# Patient Record
Sex: MALE | Race: WHITE | NOT HISPANIC OR LATINO | Employment: OTHER | URBAN - METROPOLITAN AREA
[De-identification: names, ages, dates, MRNs, and addresses within clinical notes are randomized per-mention and may not be internally consistent; named-entity substitution may affect disease eponyms.]

---

## 2017-08-15 DIAGNOSIS — N40.1 ENLARGED PROSTATE WITH LOWER URINARY TRACT SYMPTOMS (LUTS): ICD-10-CM

## 2017-08-21 ENCOUNTER — ALLSCRIPTS OFFICE VISIT (OUTPATIENT)
Dept: OTHER | Facility: OTHER | Age: 72
End: 2017-08-21

## 2017-08-21 LAB
BILIRUB UR QL STRIP: NORMAL
CLARITY UR: NORMAL
COLOR UR: YELLOW
GLUCOSE (HISTORICAL): NORMAL
HGB UR QL STRIP.AUTO: NORMAL
KETONES UR STRIP-MCNC: NORMAL MG/DL
LEUKOCYTE ESTERASE UR QL STRIP: NORMAL
NITRITE UR QL STRIP: NORMAL
PH UR STRIP.AUTO: 6 [PH]
PROT UR STRIP-MCNC: NORMAL MG/DL
SP GR UR STRIP.AUTO: 1.02
UROBILINOGEN UR QL STRIP.AUTO: 0.2

## 2017-12-28 ENCOUNTER — GENERIC CONVERSION - ENCOUNTER (OUTPATIENT)
Dept: OTHER | Facility: OTHER | Age: 72
End: 2017-12-28

## 2018-01-22 VITALS
WEIGHT: 227 LBS | HEIGHT: 68 IN | BODY MASS INDEX: 34.4 KG/M2 | DIASTOLIC BLOOD PRESSURE: 78 MMHG | SYSTOLIC BLOOD PRESSURE: 136 MMHG

## 2018-01-23 NOTE — MISCELLANEOUS
Message   Recorded as Task   Date: 12/28/2017 12:46 PM, Created By: Magan George   Task Name: Call Back   Assigned To: Rajendra HUNTER,TEAM   Regarding Patient: Chema Easton, Status: Active   Comment:    Polly Jaime - 28 Dec 2017 12:46 PM     TASK CREATED  Caller: Self; [phone] (Home); [phone] (Mobile Phone)  Pt asking if his blood type is on file ? 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

## 2018-08-16 RX ORDER — AZELASTINE HYDROCHLORIDE 137 UG/1
SPRAY, METERED NASAL
Refills: 0 | COMMUNITY
Start: 2018-06-19 | End: 2021-01-15

## 2018-08-16 RX ORDER — OLOPATADINE HYDROCHLORIDE 1 MG/ML
SOLUTION/ DROPS OPHTHALMIC
COMMUNITY
Start: 2018-08-14 | End: 2018-08-21 | Stop reason: ALTCHOICE

## 2018-08-16 RX ORDER — SUCRALFATE 1 G/1
TABLET ORAL
Refills: 0 | COMMUNITY
Start: 2018-07-17 | End: 2018-08-21 | Stop reason: ALTCHOICE

## 2018-08-16 RX ORDER — ERGOCALCIFEROL (VITAMIN D2) 10 MCG
TABLET ORAL
COMMUNITY
End: 2018-08-21 | Stop reason: ALTCHOICE

## 2018-08-16 RX ORDER — TRIAMCINOLONE ACETONIDE 55 UG/1
SPRAY, METERED NASAL
COMMUNITY
Start: 2018-06-19 | End: 2019-04-15

## 2018-08-16 RX ORDER — CHLORAL HYDRATE 500 MG
CAPSULE ORAL
COMMUNITY
End: 2018-08-21 | Stop reason: ALTCHOICE

## 2018-08-16 RX ORDER — PANTOPRAZOLE SODIUM 40 MG/1
40 TABLET, DELAYED RELEASE ORAL 2 TIMES DAILY
Refills: 0 | COMMUNITY
Start: 2018-07-17 | End: 2018-08-21 | Stop reason: ALTCHOICE

## 2018-08-16 RX ORDER — FLUTICASONE PROPIONATE 50 MCG
SPRAY, SUSPENSION (ML) NASAL
COMMUNITY
Start: 2018-08-10 | End: 2021-01-15

## 2018-08-21 ENCOUNTER — OFFICE VISIT (OUTPATIENT)
Dept: UROLOGY | Facility: MEDICAL CENTER | Age: 73
End: 2018-08-21
Payer: MEDICARE

## 2018-08-21 VITALS
WEIGHT: 214 LBS | BODY MASS INDEX: 31.7 KG/M2 | SYSTOLIC BLOOD PRESSURE: 120 MMHG | DIASTOLIC BLOOD PRESSURE: 80 MMHG | HEIGHT: 69 IN

## 2018-08-21 DIAGNOSIS — N13.8 BPH WITH OBSTRUCTION/LOWER URINARY TRACT SYMPTOMS: Primary | ICD-10-CM

## 2018-08-21 DIAGNOSIS — N40.1 BPH WITH OBSTRUCTION/LOWER URINARY TRACT SYMPTOMS: Primary | ICD-10-CM

## 2018-08-21 DIAGNOSIS — N40.1 ENLARGED PROSTATE WITH LOWER URINARY TRACT SYMPTOMS (LUTS): ICD-10-CM

## 2018-08-21 LAB
SL AMB  POCT GLUCOSE, UA: NORMAL
SL AMB LEUKOCYTE ESTERASE,UA: NORMAL
SL AMB POCT BILIRUBIN,UA: NORMAL
SL AMB POCT BLOOD,UA: NORMAL
SL AMB POCT CLARITY,UA: NORMAL
SL AMB POCT COLOR,UA: YELLOW
SL AMB POCT KETONES,UA: NORMAL
SL AMB POCT NITRITE,UA: NORMAL
SL AMB POCT PH,UA: 6.5
SL AMB POCT SPECIFIC GRAVITY,UA: 1.01
SL AMB POCT URINE PROTEIN: NORMAL
SL AMB POCT UROBILINOGEN: 0.2

## 2018-08-21 PROCEDURE — 99213 OFFICE O/P EST LOW 20 MIN: CPT | Performed by: UROLOGY

## 2018-08-21 PROCEDURE — 81003 URINALYSIS AUTO W/O SCOPE: CPT | Performed by: UROLOGY

## 2018-08-21 NOTE — LETTER
August 21, 2018     MD [address]  [place] [address]    Patient: Lincoln Bates   YOB: 1945   Date of Visit: 8/21/2018       Dear Dr Janusz Real: Thank you for referring Za Barnard to me for evaluation  Below are my notes for this consultation  If you have questions, please do not hesitate to call me  I look forward to following your patient along with you  Sincerely,        Gerda Mohr MD        CC: No Recipients  Gerda Mohr MD  8/21/2018 10:40 AM  Sign at close encounter  Assessment/Plan:    No problem-specific Assessment & Plan notes found for this encounter  Diagnoses and all orders for this visit:    BPH with obstruction/lower urinary tract symptoms  -     POCT urine dip auto non-scope    Other orders  -     Azelastine HCl 137 MCG/SPRAY SOLN;   -     Discontinue: Multiple Vitamin (DAILY VALUE MULTIVITAMIN) TABS; Take by mouth  -     Discontinue: Omega-3 Fatty Acids (FISH OIL) 1,000 mg; Take by mouth  -     BREO ELLIPTA 200-25 MCG/INH inhaler;   -     fluticasone (FLONASE) 50 mcg/act nasal spray;   -     Discontinue: olopatadine (PATANOL) 0 1 % ophthalmic solution;   -     Discontinue: pantoprazole (PROTONIX) 40 mg tablet; Take 40 mg by mouth 2 (two) times a day  -     Discontinue: sucralfate (CARAFATE) 1 g tablet; take 1 tablet by mouth four times a day before meals and at bedtime  -     Triamcinolone Acetonide 55 MCG/ACT AERO;           Subjective:      Patient ID: Lincoln Bates is a 75 y o  male      HPI    The following portions of the patient's history were reviewed and updated as appropriate: allergies, current medications, past family history, past medical history, past social history, past surgical history and problem list     Review of Systems      Objective:      /80 (BP Location: Left arm, Patient Position: Sitting)   Ht 5' 8 5" (1 74 m)   Wt 97 1 kg (214 lb)   BMI 32 07 kg/m²           Physical Exam

## 2018-08-21 NOTE — PATIENT INSTRUCTIONS
Benign Prostatic Hypertrophy   WHAT YOU NEED TO KNOW:   Benign prostatic hypertrophy (BPH) is a condition that causes your prostate gland to grow larger than normal  The prostate gland is the male sex gland that produces a fluid that is part of semen  It is about the size of a walnut and it is located under the bladder  As the prostate grows, it can squeeze the urethra  This can block urine flow and cause urinary problems  DISCHARGE INSTRUCTIONS:   Medicines:   · Alpha blockers: This medicine relaxes the muscles in your prostate and bladder  It may help you urinate more easily  · 5 alpha reductase inhibitors: These medicines block the production of a hormone that causes the prostate to get larger  It may help slow the growth of the prostate or shrink the prostate  · Take your medicine as directed  Contact your healthcare provider if you think your medicine is not helping or if you have side effects  Tell him or her if you are allergic to any medicine  Keep a list of the medicines, vitamins, and herbs you take  Include the amounts, and when and why you take them  Bring the list or the pill bottles to follow-up visits  Carry your medicine list with you in case of an emergency  Follow up with your healthcare provider as directed:  Write down your questions so you remember to ask them during your visits  Manage BPH:   · Do not let your bladder get too full before you empty it  Urinate when you feel the urge  · Limit alcohol  Do not drink large amounts of any liquid at one time  · Decrease the amount of salt you eat  Examples of salty foods are chips, cured meats, and canned soups  Do not use table salt  · Healthcare providers may tell you not to eat spicy foods such as chilli peppers  This may help you find out if spicy food makes your BPH symptoms worse  · You may have sex if you feel well  Contact your healthcare provider if:   · There is a large amount of blood in your urine  · Your signs and symptoms get worse  · You have a fever  · You have questions or concerns about your condition or care  Seek care immediately if:   · You are unable to urinate  · Your bladder feels very full and painful  © 2017 [address] [employer] Information is for End User's use only and may not be sold, redistributed or otherwise used for commercial purposes  All illustrations and images included in CareNotes® are the copyrighted property of A D A M , Inc  or Chandan Perkins  The above information is an  only  It is not intended as medical advice for individual conditions or treatments  Talk to your doctor, nurse or pharmacist before following any medical regimen to see if it is safe and effective for you

## 2018-08-21 NOTE — PROGRESS NOTES
Assessment/Plan:    BPH with obstruction/lower urinary tract symptoms  AUA symptom score is 2  He is pleased with his voiding pattern 4 years post GreenLight laser  Urinalysis today is negative  PSA was 1 28 on August 11, 2018  He is doing well  He will return in 1 year  We discussed the pros and cons of PSA testing and will decide whether he has a PSA level drawn after next year's visit  Diagnoses and all orders for this visit:    BPH with obstruction/lower urinary tract symptoms  -     POCT urine dip auto non-scope    Other orders  -     Azelastine HCl 137 MCG/SPRAY SOLN;   -     Discontinue: Multiple Vitamin (DAILY VALUE MULTIVITAMIN) TABS; Take by mouth  -     Discontinue: Omega-3 Fatty Acids (FISH OIL) 1,000 mg; Take by mouth  -     BREO ELLIPTA 200-25 MCG/INH inhaler;   -     fluticasone (FLONASE) 50 mcg/act nasal spray;   -     Discontinue: olopatadine (PATANOL) 0 1 % ophthalmic solution;   -     Discontinue: pantoprazole (PROTONIX) 40 mg tablet; Take 40 mg by mouth 2 (two) times a day  -     Discontinue: sucralfate (CARAFATE) 1 g tablet; take 1 tablet by mouth four times a day before meals and at bedtime  -     Triamcinolone Acetonide 55 MCG/ACT AERO;           Subjective:      Patient ID: Irlanda Weston is a 75 y o  male  Chief complaint:  Lower tract symptoms    69-year-old male followed for lower tract symptoms  He is status post GreenLight laser prostatectomy in April, 2014  He reports he is voiding well  His stream is good and he empties his bladder adequately  He has no urgency or incontinence  There is no frequency  He gets up at most once a night to urinate  He is very pleased with his voiding pattern  There is no gross hematuria, dysuria or symptoms of infection  He has no history of urinary tract infection  He does have chronic renal insufficiency and notes that he will be seeing a nephrologist in September          The following portions of the patient's history were reviewed and updated as appropriate: allergies, current medications, past family history, past medical history, past social history, past surgical history and problem list     Review of Systems   Constitutional: Negative for chills, diaphoresis, fatigue and fever  HENT: Negative  Eyes: Negative  Respiratory: Negative  Cardiovascular: Negative  Endocrine: Negative  Musculoskeletal: Positive for back pain  Skin: Negative  Allergic/Immunologic: Negative  Neurological: Negative  Hematological: Negative  Psychiatric/Behavioral: Negative  Objective:      /80 (BP Location: Left arm, Patient Position: Sitting)   Ht 5' 8 5" (1 74 m)   Wt 97 1 kg (214 lb)   BMI 32 07 kg/m²          Physical Exam   Constitutional: He is oriented to person, place, and time  He appears well-developed and well-nourished  HENT:   Head: Normocephalic and atraumatic  Eyes: Conjunctivae are normal    Neck: Neck supple  Cardiovascular: Normal rate  Pulmonary/Chest: Effort normal    Abdominal: Soft  Bowel sounds are normal  He exhibits no distension and no mass  There is no tenderness  There is no rebound, no guarding and no CVA tenderness  A hernia is present  Hernia confirmed positive in the right inguinal area and confirmed positive in the left inguinal area  Genitourinary: Rectum normal, testes normal and penis normal  Right testis shows no mass  Left testis shows no mass  No phimosis or hypospadias  Genitourinary Comments: Prostate 1+ enlarged and palpably benign   Musculoskeletal: He exhibits no edema  Neurological: He is alert and oriented to person, place, and time  Skin: Skin is warm and dry  Psychiatric: He has a normal mood and affect  His behavior is normal  Judgment and thought content normal    Vitals reviewed

## 2018-08-21 NOTE — ASSESSMENT & PLAN NOTE
AUA symptom score is 2  He is pleased with his voiding pattern 4 years post GreenLight laser  Urinalysis today is negative  PSA was 1 28 on August 11, 2018  He is doing well  He will return in 1 year  We discussed the pros and cons of PSA testing and will decide whether he has a PSA level drawn after next year's visit

## 2019-01-31 ENCOUNTER — APPOINTMENT (OUTPATIENT)
Dept: URGENT CARE | Facility: MEDICAL CENTER | Age: 74
End: 2019-01-31
Payer: MEDICARE

## 2019-01-31 ENCOUNTER — APPOINTMENT (OUTPATIENT)
Dept: RADIOLOGY | Facility: MEDICAL CENTER | Age: 74
End: 2019-01-31
Payer: MEDICARE

## 2019-01-31 ENCOUNTER — TRANSCRIBE ORDERS (OUTPATIENT)
Dept: ADMINISTRATIVE | Facility: HOSPITAL | Age: 74
End: 2019-01-31
Payer: MEDICARE

## 2019-01-31 DIAGNOSIS — K40.20 NON-RECURRENT BILATERAL INGUINAL HERNIA WITHOUT OBSTRUCTION OR GANGRENE: ICD-10-CM

## 2019-01-31 DIAGNOSIS — K40.20 NON-RECURRENT BILATERAL INGUINAL HERNIA WITHOUT OBSTRUCTION OR GANGRENE: Primary | ICD-10-CM

## 2019-01-31 PROCEDURE — 71046 X-RAY EXAM CHEST 2 VIEWS: CPT

## 2019-01-31 PROCEDURE — 93005 ELECTROCARDIOGRAM TRACING: CPT | Performed by: SURGERY

## 2019-02-01 LAB
ATRIAL RATE: 81 BPM
P AXIS: 59 DEGREES
PR INTERVAL: 150 MS
QRS AXIS: -2 DEGREES
QRSD INTERVAL: 86 MS
QT INTERVAL: 372 MS
QTC INTERVAL: 432 MS
T WAVE AXIS: 41 DEGREES
VENTRICULAR RATE: 81 BPM

## 2019-02-01 PROCEDURE — 93010 ELECTROCARDIOGRAM REPORT: CPT | Performed by: SURGERY

## 2019-04-15 VITALS
BODY MASS INDEX: 33.04 KG/M2 | DIASTOLIC BLOOD PRESSURE: 79 MMHG | HEIGHT: 68 IN | HEART RATE: 81 BPM | WEIGHT: 218 LBS | SYSTOLIC BLOOD PRESSURE: 136 MMHG

## 2019-04-15 DIAGNOSIS — M65.331 TRIGGER MIDDLE FINGER OF RIGHT HAND: Primary | ICD-10-CM

## 2019-04-15 PROCEDURE — 20550 NJX 1 TENDON SHEATH/LIGAMENT: CPT | Performed by: ORTHOPAEDIC SURGERY

## 2019-04-15 PROCEDURE — 99203 OFFICE O/P NEW LOW 30 MIN: CPT | Performed by: ORTHOPAEDIC SURGERY

## 2019-04-15 RX ORDER — ROSUVASTATIN CALCIUM 10 MG/1
TABLET, COATED ORAL
Refills: 0 | COMMUNITY
Start: 2019-03-09 | End: 2021-01-15

## 2019-04-15 RX ORDER — ASPIRIN 81 MG/1
81 TABLET ORAL DAILY
COMMUNITY
End: 2021-01-15

## 2019-04-15 RX ADMIN — LIDOCAINE HYDROCHLORIDE 0.5 ML: 10 INJECTION, SOLUTION INFILTRATION; PERINEURAL at 14:40

## 2019-04-15 RX ADMIN — TRIAMCINOLONE ACETONIDE 20 MG: 40 INJECTION, SUSPENSION INTRA-ARTICULAR; INTRAMUSCULAR at 14:40

## 2019-04-16 RX ORDER — TRIAMCINOLONE ACETONIDE 40 MG/ML
20 INJECTION, SUSPENSION INTRA-ARTICULAR; INTRAMUSCULAR
Status: COMPLETED | OUTPATIENT
Start: 2019-04-15 | End: 2019-04-15

## 2019-04-16 RX ORDER — LIDOCAINE HYDROCHLORIDE 10 MG/ML
0.5 INJECTION, SOLUTION INFILTRATION; PERINEURAL
Status: COMPLETED | OUTPATIENT
Start: 2019-04-15 | End: 2019-04-15

## 2019-04-29 VITALS
HEIGHT: 68 IN | DIASTOLIC BLOOD PRESSURE: 86 MMHG | BODY MASS INDEX: 33.04 KG/M2 | HEART RATE: 69 BPM | WEIGHT: 218 LBS | SYSTOLIC BLOOD PRESSURE: 148 MMHG

## 2019-04-29 DIAGNOSIS — M65.331 TRIGGER MIDDLE FINGER OF RIGHT HAND: Primary | ICD-10-CM

## 2019-04-29 PROCEDURE — 99213 OFFICE O/P EST LOW 20 MIN: CPT | Performed by: ORTHOPAEDIC SURGERY

## 2019-04-29 RX ORDER — TRIAMCINOLONE ACETONIDE 55 UG/1
SPRAY, METERED NASAL
COMMUNITY
Start: 2018-06-19 | End: 2021-01-15

## 2019-08-15 ENCOUNTER — TELEPHONE (OUTPATIENT)
Dept: UROLOGY | Facility: MEDICAL CENTER | Age: 74
End: 2019-08-15

## 2019-08-15 NOTE — TELEPHONE ENCOUNTER
LMOM for pt to return call  Documentation from office visit on 8/21/18 by Dr Yaritza Torrez      (We discussed the pros and cons of PSA testing and will decide whether he has a PSA level drawn after next year's visit )

## 2019-08-15 NOTE — TELEPHONE ENCOUNTER
Patient of Dr Bear Marley seen in the [facility] office  Patient advised that he has a 1 year follow up and would like to know if he is in need of labs prior to visit  Please advise

## 2019-09-17 NOTE — PROGRESS NOTES
There are no diagnoses linked to this encounter  Assessment and plan:       1  Lower urinary tract symptoms  - Patient denies any lower urinary tract symptoms today  - He does have incomplete bladder emptying with a PVR of 100 mL  He has previously required GreenLight laser procedure for high volume urinary retention   - He will follow up in 1 year for symptom reassessment and PVR  Jose Strauss PA-C      Chief Complaint     Chief Complaint   Patient presents with    Benign Prostatic Hypertrophy         History of Present Illness     Austin Matta is a 75 y o  male patient known to Dr Alphonso Lucero for lower urinary tract symptoms  He underwent GreenLight laser prostatectomy in April of 2014  He continues to report he is voiding well with a strong stream and emptying his bladder adequately  He continues to deny any urgency, frequency, or incontinence  Patient does have chronic renal insufficiency and is followed by a nephrologist for this  Patient had PSA drawn as recently as last year where it was 1 8  At his last visit he and Dr Alphonso Lucero discussed the pros and cons of continuing PSA testing  The patient has chosen to discontinue PSA screening per AUA guidelines  Laboratory     No results found for: CREATININE    No results found for: PSA    Recent Results (from the past 1 hour(s))   POCT Measure PVR    Collection Time: 09/18/19  9:46 AM   Result Value Ref Range    POST-VOID RESIDUAL VOLUME, ML  mL         Review of Systems     Review of Systems   Constitutional: Negative for chills and fever  HENT: Negative  Eyes: Negative  Respiratory: Negative for shortness of breath  Cardiovascular: Negative for chest pain  Gastrointestinal: Negative for constipation, diarrhea, nausea and vomiting  Genitourinary: Negative for difficulty urinating, dysuria, enuresis, flank pain, frequency, hematuria and urgency           AUA SYMPTOM SCORE      Most Recent Value   AUA SYMPTOM SCORE   How often have you had a sensation of not emptying your bladder completely after you finished urinating? 0   How often have you had to urinate again less than two hours after you finished urinating? 1   How often have you found you stopped and started again several times when you urinate?  0   How often have you found it difficult to postpone urination? 0   How often have you had a weak urinary stream?  1   How often have you had to push or strain to begin urination? 0   How many times did you most typically get up to urinate from the time you went to bed at night until the time you got up in the morning? 1   Quality of Life: If you were to spend the rest of your life with your urinary condition just the way it is now, how would you feel about that?  0   AUA SYMPTOM SCORE  3                Allergies     Allergies   Allergen Reactions    Other     Pollen Extract        Physical Exam     Physical Exam   Constitutional: He is oriented to person, place, and time  He appears well-developed and well-nourished  No distress  Appears younger than stated age   HENT:   Head: Normocephalic and atraumatic  Right Ear: External ear normal    Left Ear: External ear normal    Nose: Nose normal    Eyes: Right eye exhibits no discharge  Left eye exhibits no discharge  No scleral icterus  Cardiovascular: Normal rate  Pulmonary/Chest: Effort normal    Genitourinary:   Genitourinary Comments: Declined   Musculoskeletal:   Ambulates independently   Neurological: He is alert and oriented to person, place, and time  Skin: Skin is warm and dry  He is not diaphoretic  Psychiatric: He has a normal mood and affect  His behavior is normal  Judgment and thought content normal    Nursing note and vitals reviewed          Vital Signs     Vitals:    09/18/19 0943   BP: 132/86   BP Location: Left arm   Patient Position: Sitting   Cuff Size: Standard   Pulse: 80   Weight: 98 kg (216 lb)   Height: 5' 8" (1 727 m) Current Medications       Current Outpatient Medications:     BREO ELLIPTA 200-25 MCG/INH inhaler, , Disp: , Rfl:     Cholecalciferol 5000 units capsule, Take 5,000 Units by mouth, Disp: , Rfl:     fluticasone (FLONASE) 50 mcg/act nasal spray, , Disp: , Rfl:     olopatadine (PATANOL) 0 1 % ophthalmic solution, 1 drop, Disp: , Rfl:     aspirin (ECOTRIN LOW STRENGTH) 81 mg EC tablet, Take 81 mg by mouth daily, Disp: , Rfl:     Azelastine HCl 137 MCG/SPRAY SOLN, , Disp: , Rfl: 0    PROAIR  (90 Base) MCG/ACT inhaler, inhale 1 puff by mouth every 4 hours if needed for wheezing, Disp: , Rfl: 0    rosuvastatin (CRESTOR) 10 MG tablet, , Disp: , Rfl: 0    Triamcinolone Acetonide 55 MCG/ACT AERO, , Disp: , Rfl:       Active Problems     Patient Active Problem List   Diagnosis    BPH with obstruction/lower urinary tract symptoms         Past Medical History     Past Medical History:   Diagnosis Date    Azotemia     BPH with obstruction/lower urinary tract symptoms     Hydronephrosis     Incomplete bladder emptying     Urinary retention          Surgical History     Past Surgical History:   Procedure Laterality Date    CYSTOSCOPY  2014    KNEE SURGERY  2013    ROOT CANAL      TRANSURETHRAL RESECTION OF PROSTATE           Family History     Family History   Family history unknown: Yes         Social History     Social History       Radiology

## 2019-09-18 ENCOUNTER — OFFICE VISIT (OUTPATIENT)
Dept: UROLOGY | Facility: CLINIC | Age: 74
End: 2019-09-18
Payer: MEDICARE

## 2019-09-18 VITALS
WEIGHT: 216 LBS | DIASTOLIC BLOOD PRESSURE: 86 MMHG | BODY MASS INDEX: 32.74 KG/M2 | HEIGHT: 68 IN | SYSTOLIC BLOOD PRESSURE: 132 MMHG | HEART RATE: 80 BPM

## 2019-09-18 DIAGNOSIS — N40.1 BPH WITH OBSTRUCTION/LOWER URINARY TRACT SYMPTOMS: Primary | ICD-10-CM

## 2019-09-18 DIAGNOSIS — N13.8 BPH WITH OBSTRUCTION/LOWER URINARY TRACT SYMPTOMS: Primary | ICD-10-CM

## 2019-09-18 LAB — POST-VOID RESIDUAL VOLUME, ML POC: 100 ML

## 2019-09-18 PROCEDURE — 99213 OFFICE O/P EST LOW 20 MIN: CPT | Performed by: PHYSICIAN ASSISTANT

## 2019-09-18 PROCEDURE — 51798 US URINE CAPACITY MEASURE: CPT | Performed by: PHYSICIAN ASSISTANT

## 2019-09-18 RX ORDER — OLOPATADINE HYDROCHLORIDE 1 MG/ML
1 SOLUTION/ DROPS OPHTHALMIC
COMMUNITY
Start: 2018-08-13

## 2020-04-28 VITALS
HEART RATE: 79 BPM | DIASTOLIC BLOOD PRESSURE: 98 MMHG | HEIGHT: 68 IN | SYSTOLIC BLOOD PRESSURE: 150 MMHG | WEIGHT: 216 LBS | BODY MASS INDEX: 32.74 KG/M2

## 2020-04-28 DIAGNOSIS — M65.331 TRIGGER MIDDLE FINGER OF RIGHT HAND: Primary | ICD-10-CM

## 2020-04-28 PROCEDURE — 99213 OFFICE O/P EST LOW 20 MIN: CPT | Performed by: ORTHOPAEDIC SURGERY

## 2020-04-28 PROCEDURE — 20550 NJX 1 TENDON SHEATH/LIGAMENT: CPT | Performed by: ORTHOPAEDIC SURGERY

## 2020-04-28 RX ADMIN — TRIAMCINOLONE ACETONIDE 20 MG: 40 INJECTION, SUSPENSION INTRA-ARTICULAR; INTRAMUSCULAR at 11:23

## 2020-04-28 RX ADMIN — LIDOCAINE HYDROCHLORIDE 0.5 ML: 10 INJECTION, SOLUTION INFILTRATION; PERINEURAL at 11:23

## 2020-04-30 RX ORDER — LIDOCAINE HYDROCHLORIDE 10 MG/ML
0.5 INJECTION, SOLUTION INFILTRATION; PERINEURAL
Status: COMPLETED | OUTPATIENT
Start: 2020-04-28 | End: 2020-04-28

## 2020-04-30 RX ORDER — TRIAMCINOLONE ACETONIDE 40 MG/ML
20 INJECTION, SUSPENSION INTRA-ARTICULAR; INTRAMUSCULAR
Status: COMPLETED | OUTPATIENT
Start: 2020-04-28 | End: 2020-04-28

## 2020-10-07 ENCOUNTER — OFFICE VISIT (OUTPATIENT)
Dept: UROLOGY | Facility: CLINIC | Age: 75
End: 2020-10-07
Payer: MEDICARE

## 2020-10-07 VITALS
DIASTOLIC BLOOD PRESSURE: 70 MMHG | WEIGHT: 224.8 LBS | HEART RATE: 86 BPM | HEIGHT: 68 IN | TEMPERATURE: 97.3 F | SYSTOLIC BLOOD PRESSURE: 138 MMHG | BODY MASS INDEX: 34.07 KG/M2

## 2020-10-07 DIAGNOSIS — N13.8 BPH WITH OBSTRUCTION/LOWER URINARY TRACT SYMPTOMS: Primary | ICD-10-CM

## 2020-10-07 DIAGNOSIS — N40.1 BPH WITH OBSTRUCTION/LOWER URINARY TRACT SYMPTOMS: Primary | ICD-10-CM

## 2020-10-07 LAB — POST-VOID RESIDUAL VOLUME, ML POC: 44 ML

## 2020-10-07 PROCEDURE — 99213 OFFICE O/P EST LOW 20 MIN: CPT | Performed by: PHYSICIAN ASSISTANT

## 2020-10-07 PROCEDURE — 51798 US URINE CAPACITY MEASURE: CPT | Performed by: PHYSICIAN ASSISTANT

## 2021-01-11 VITALS
BODY MASS INDEX: 32.58 KG/M2 | DIASTOLIC BLOOD PRESSURE: 83 MMHG | HEART RATE: 77 BPM | HEIGHT: 68 IN | SYSTOLIC BLOOD PRESSURE: 148 MMHG | WEIGHT: 215 LBS

## 2021-01-11 DIAGNOSIS — M65.331 TRIGGER MIDDLE FINGER OF RIGHT HAND: Primary | ICD-10-CM

## 2021-01-11 PROCEDURE — 99214 OFFICE O/P EST MOD 30 MIN: CPT | Performed by: ORTHOPAEDIC SURGERY

## 2021-01-11 RX ORDER — A/SINGAPORE/GP1908/2015 IVR-180 (AN A/MICHIGAN/45/2015 (H1N1)PDM09-LIKE VIRUS, A/HONG KONG/4801/2014, NYMC X-263B (H3N2) (AN A/HONG KONG/4801/2014-LIKE VIRUS), AND B/BRISBANE/60/2008, WILD TYPE (A B/BRISBANE/60/2008-LIKE VIRUS) 15; 15; 15 UG/.5ML; UG/.5ML; UG/.5ML
INJECTION, SUSPENSION INTRAMUSCULAR
COMMUNITY
Start: 2020-10-28 | End: 2021-01-15

## 2021-01-11 RX ORDER — ACETAMINOPHEN 500 MG
TABLET ORAL
Qty: 30 TABLET | Refills: 0 | Status: SHIPPED | OUTPATIENT
Start: 2021-01-11

## 2021-01-11 RX ORDER — LIDOCAINE HYDROCHLORIDE AND EPINEPHRINE 10; 10 MG/ML; UG/ML
10 INJECTION, SOLUTION INFILTRATION; PERINEURAL ONCE
Status: CANCELLED | OUTPATIENT
Start: 2021-01-11 | End: 2021-01-11

## 2021-01-11 NOTE — H&P (VIEW-ONLY)
CHIEF COMPLAINT:  Chief Complaint   Patient presents with    Right Hand - Follow-up       SUBJECTIVE:  Clari Cha is a 67y o  year old male who presents for follow-up regarding right long finger trigger finger  Patient was given a cortisone injection on 04/15/2019 and 2020  Patient states that he notes continued locking clicking symptoms  He would like to proceed with surgical intervention at this time  The patient denies any cardiac or pulmonary issues  Denies diabetes  Denies any history of MI, gastric ulcers, or liver issues  Denies blood thinners  Patient is unable to take anti-inflammatories due to stage 3 kidney disease        PAST MEDICAL HISTORY:  Past Medical History:   Diagnosis Date    Azotemia     BPH with obstruction/lower urinary tract symptoms     Hydronephrosis     Incomplete bladder emptying     Urinary retention        PAST SURGICAL HISTORY:  Past Surgical History:   Procedure Laterality Date    CYSTOSCOPY  2014    KNEE SURGERY  2013    ROOT CANAL      TRANSURETHRAL RESECTION OF PROSTATE         FAMILY HISTORY:  Family History   Family history unknown: Yes       SOCIAL HISTORY:  Social History     Tobacco Use    Smoking status: Former Smoker     Quit date: 2011     Years since quittin 9    Smokeless tobacco: Never Used   Substance Use Topics    Alcohol use:  Yes    Drug use: No       MEDICATIONS:    Current Outpatient Medications:     aspirin (ECOTRIN LOW STRENGTH) 81 mg EC tablet, Take 81 mg by mouth daily, Disp: , Rfl:     Azelastine HCl 137 MCG/SPRAY SOLN, , Disp: , Rfl: 0    BREO ELLIPTA 200-25 MCG/INH inhaler, , Disp: , Rfl:     Cholecalciferol 5000 units capsule, Take 2,000 Units by mouth , Disp: , Rfl:     Fluad Quadrivalent 0 5 ML PRSY, inject 0 5 milliliters intramuscularly, Disp: , Rfl:     fluticasone (FLONASE) 50 mcg/act nasal spray, , Disp: , Rfl:     olopatadine (PATANOL) 0 1 % ophthalmic solution, 1 drop, Disp: , Rfl:     PROAIR HFA 108 (90 Base) MCG/ACT inhaler, inhale 1 puff by mouth every 4 hours if needed for wheezing, Disp: , Rfl: 0    rosuvastatin (CRESTOR) 10 MG tablet, , Disp: , Rfl: 0    Triamcinolone Acetonide 55 MCG/ACT AERO, , Disp: , Rfl:     acetaminophen (TYLENOL) 500 mg tablet, Take 1 500mg tablet in the morning prior to surgery, then 1 tablet every 6 hours for 5-7 days  , Disp: 30 tablet, Rfl: 0    ALLERGIES:  Allergies   Allergen Reactions    Other      Cats, Ragweed    Pollen Extract        REVIEW OF SYSTEMS:  Review of Systems  ROS:   General: no fever, no chills  HEENT:  No loss of hearing or eyesight problems  Eyes:  No red eyes  Respiratory:  No coughing, shortness of breath or wheezing  Cardiovascular:  No chest pain, no palpitations  GI:  Abdomen soft nontender, denies nausea  Endocrine:  No muscle weakness, no frequent urination, no excessive thirst  Urinary:  No dysuria, no incontinence  Musculoskeletal: see HPI and PE  SKIN:  No skin rash, no dry skin  Neurological:  No headaches, no confusion  Psychiatric:  No suicide thoughts, no anxiety, no depression  Review of all other systems is negative    VITALS:  Vitals:    01/11/21 1051   BP: 148/83   Pulse: 77       LABS:  HgA1c: No results found for: HGBA1C  BMP: No results found for: GLUCOSE, CALCIUM, NA, K, CO2, CL, BUN, CREATININE    _____________________________________________________  PHYSICAL EXAMINATION:  General: well developed and well nourished, alert, oriented times 3 and appears comfortable  Psychiatric: Normal  HEENT: Trachea Midline, No torticollis  Heart:  Regular rate and rhythm  Lungs:  Clear to auscultation  Pulmonary: No audible wheezing or respiratory distress   Skin: No masses, erythema, lacerations, fluctation, ulcerations  Neurovascular: Sensation Intact to the Median, Ulnar, Radial Nerve, Motor Intact to the Median, Ulnar, Radial Nerve and Pulses Intact    MUSCULOSKELETAL EXAMINATION:  right long finger:  Positive palpable nodule over the A1 pulley  Positive tenderness to palpation over A1 pulley  Positive clicking  Positive catching        ___________________________________________________  STUDIES REVIEWED:  No studies reviewed  PROCEDURES PERFORMED:  Procedures  No Procedures performed today    _____________________________________________________  ASSESSMENT/PLAN:    Right long finger trigger finger  - conservative and operative treatment were discussed with the patient  He would like to proceed with surgical intervention  Detail consent was obtained today   Surgery: right long finger trigger finger release   Anesthesia:  Local   Antibiotics:  None   Medical clearance: not needed   Hand therapy: ordered   Consent: obtained   Patient will take Tylenol as needed for pain    Surgery medication instructions: You will stop eating and drinking at midnight the night before your surgery, but you may continue to take your normal medications with a small sip of water  In the morning on the day of your surgery, we would like you to take the following medication:   Tylenol 500mg one tablet by mouth    After surgery, we would like you to take Tylenol 500 mg one tablet by mouth every 6 hours  (at breakfast, lunch and dinner) for 5-7 days after your surgery  Please take this medication EVERYDAY after surgery for 5-7 days, and not just as needed  Taking this medications after surgery will limit your need for prescription pain medication  We will also prescribe a narcotic pain medication for a limited time after surgery that you can take as needed for moderate or severe pain  The narcotic pain medication may also have Tylenol in it  Please limit Tylenol usage to under 3,000mg a day  Diagnoses and all orders for this visit:    Trigger middle finger of right hand  -     Ambulatory referral to PT/OT hand therapy; Future  -     acetaminophen (TYLENOL) 500 mg tablet;  Take 1 500mg tablet in the morning prior to surgery, then 1 tablet every 6 hours for 5-7 days  -     Case request operating room: Right long finger trigger finger release; Standing  -     Case request operating room: Right long finger trigger finger release    Other orders  -     Fluad Quadrivalent 0 5 ML PRSY; inject 0 5 milliliters intramuscularly  -     Diet NPO; Sips with meds; Standing  -     Height and weight upon arrival; Standing  -     Void on call to OR; Standing  -     lidocaine-epinephrine (XYLOCAINE/EPINEPHRINE) 1 %-1:100,000 injection 10 mL  -     sodium bicarbonate 8 4 % injection 50 mEq          Follow Up:  Return for post op   Work/school status:  No restrictions    To Do Next Visit:  Re-evaluation of current issue and Sutures out      Scribe Attestation    I,:  Flores Oviedo PA-C am acting as a scribe while in the presence of the attending physician :       I,:  Ghanshyam Ortiz MD personally performed the services described in this documentation    as scribed in my presence :           Portions of the record may have been created with voice recognition software  Occasional wrong word or "sound a like" substitutions may have occurred due to the inherent limitations of voice recognition software  Read the chart carefully and recognize, using context, where substitutions have occurred

## 2021-01-11 NOTE — H&P
CHIEF COMPLAINT:  Chief Complaint   Patient presents with    Right Hand - Follow-up       SUBJECTIVE:  Kb Yarbrough is a 67y o  year old male who presents for follow-up regarding right long finger trigger finger  Patient was given a cortisone injection on 04/15/2019 and 2020  Patient states that he notes continued locking clicking symptoms  He would like to proceed with surgical intervention at this time  The patient denies any cardiac or pulmonary issues  Denies diabetes  Denies any history of MI, gastric ulcers, or liver issues  Denies blood thinners  Patient is unable to take anti-inflammatories due to stage 3 kidney disease        PAST MEDICAL HISTORY:  Past Medical History:   Diagnosis Date    Azotemia     BPH with obstruction/lower urinary tract symptoms     Hydronephrosis     Incomplete bladder emptying     Urinary retention        PAST SURGICAL HISTORY:  Past Surgical History:   Procedure Laterality Date    CYSTOSCOPY  2014    KNEE SURGERY  2013    ROOT CANAL      TRANSURETHRAL RESECTION OF PROSTATE         FAMILY HISTORY:  Family History   Family history unknown: Yes       SOCIAL HISTORY:  Social History     Tobacco Use    Smoking status: Former Smoker     Quit date: 2011     Years since quittin 9    Smokeless tobacco: Never Used   Substance Use Topics    Alcohol use:  Yes    Drug use: No       MEDICATIONS:    Current Outpatient Medications:     aspirin (ECOTRIN LOW STRENGTH) 81 mg EC tablet, Take 81 mg by mouth daily, Disp: , Rfl:     Azelastine HCl 137 MCG/SPRAY SOLN, , Disp: , Rfl: 0    BREO ELLIPTA 200-25 MCG/INH inhaler, , Disp: , Rfl:     Cholecalciferol 5000 units capsule, Take 2,000 Units by mouth , Disp: , Rfl:     Fluad Quadrivalent 0 5 ML PRSY, inject 0 5 milliliters intramuscularly, Disp: , Rfl:     fluticasone (FLONASE) 50 mcg/act nasal spray, , Disp: , Rfl:     olopatadine (PATANOL) 0 1 % ophthalmic solution, 1 drop, Disp: , Rfl:     PROAIR HFA 108 (90 Base) MCG/ACT inhaler, inhale 1 puff by mouth every 4 hours if needed for wheezing, Disp: , Rfl: 0    rosuvastatin (CRESTOR) 10 MG tablet, , Disp: , Rfl: 0    Triamcinolone Acetonide 55 MCG/ACT AERO, , Disp: , Rfl:     acetaminophen (TYLENOL) 500 mg tablet, Take 1 500mg tablet in the morning prior to surgery, then 1 tablet every 6 hours for 5-7 days  , Disp: 30 tablet, Rfl: 0    ALLERGIES:  Allergies   Allergen Reactions    Other      Cats, Ragweed    Pollen Extract        REVIEW OF SYSTEMS:  Review of Systems  ROS:   General: no fever, no chills  HEENT:  No loss of hearing or eyesight problems  Eyes:  No red eyes  Respiratory:  No coughing, shortness of breath or wheezing  Cardiovascular:  No chest pain, no palpitations  GI:  Abdomen soft nontender, denies nausea  Endocrine:  No muscle weakness, no frequent urination, no excessive thirst  Urinary:  No dysuria, no incontinence  Musculoskeletal: see HPI and PE  SKIN:  No skin rash, no dry skin  Neurological:  No headaches, no confusion  Psychiatric:  No suicide thoughts, no anxiety, no depression  Review of all other systems is negative    VITALS:  Vitals:    01/11/21 1051   BP: 148/83   Pulse: 77       LABS:  HgA1c: No results found for: HGBA1C  BMP: No results found for: GLUCOSE, CALCIUM, NA, K, CO2, CL, BUN, CREATININE    _____________________________________________________  PHYSICAL EXAMINATION:  General: well developed and well nourished, alert, oriented times 3 and appears comfortable  Psychiatric: Normal  HEENT: Trachea Midline, No torticollis  Heart:  Regular rate and rhythm  Lungs:  Clear to auscultation  Pulmonary: No audible wheezing or respiratory distress   Skin: No masses, erythema, lacerations, fluctation, ulcerations  Neurovascular: Sensation Intact to the Median, Ulnar, Radial Nerve, Motor Intact to the Median, Ulnar, Radial Nerve and Pulses Intact    MUSCULOSKELETAL EXAMINATION:  right long finger:  Positive palpable nodule over the A1 pulley  Positive tenderness to palpation over A1 pulley  Positive clicking  Positive catching        ___________________________________________________  STUDIES REVIEWED:  No studies reviewed  PROCEDURES PERFORMED:  Procedures  No Procedures performed today    _____________________________________________________  ASSESSMENT/PLAN:    Right long finger trigger finger  - conservative and operative treatment were discussed with the patient  He would like to proceed with surgical intervention  Detail consent was obtained today   Surgery: right long finger trigger finger release   Anesthesia:  Local   Antibiotics:  None   Medical clearance: not needed   Hand therapy: ordered   Consent: obtained   Patient will take Tylenol as needed for pain    Surgery medication instructions: You will stop eating and drinking at midnight the night before your surgery, but you may continue to take your normal medications with a small sip of water  In the morning on the day of your surgery, we would like you to take the following medication:   Tylenol 500mg one tablet by mouth    After surgery, we would like you to take Tylenol 500 mg one tablet by mouth every 6 hours  (at breakfast, lunch and dinner) for 5-7 days after your surgery  Please take this medication EVERYDAY after surgery for 5-7 days, and not just as needed  Taking this medications after surgery will limit your need for prescription pain medication  We will also prescribe a narcotic pain medication for a limited time after surgery that you can take as needed for moderate or severe pain  The narcotic pain medication may also have Tylenol in it  Please limit Tylenol usage to under 3,000mg a day  Diagnoses and all orders for this visit:    Trigger middle finger of right hand  -     Ambulatory referral to PT/OT hand therapy; Future  -     acetaminophen (TYLENOL) 500 mg tablet;  Take 1 500mg tablet in the morning prior to surgery, then 1 tablet every 6 hours for 5-7 days  -     Case request operating room: Right long finger trigger finger release; Standing  -     Case request operating room: Right long finger trigger finger release    Other orders  -     Fluad Quadrivalent 0 5 ML PRSY; inject 0 5 milliliters intramuscularly  -     Diet NPO; Sips with meds; Standing  -     Height and weight upon arrival; Standing  -     Void on call to OR; Standing  -     lidocaine-epinephrine (XYLOCAINE/EPINEPHRINE) 1 %-1:100,000 injection 10 mL  -     sodium bicarbonate 8 4 % injection 50 mEq          Follow Up:  Return for post op   Work/school status:  No restrictions    To Do Next Visit:  Re-evaluation of current issue and Sutures out      Scribe Attestation    I,:  Flores Oviedo PA-C am acting as a scribe while in the presence of the attending physician :       I,:  Ghanshyam Ortiz MD personally performed the services described in this documentation    as scribed in my presence :           Portions of the record may have been created with voice recognition software  Occasional wrong word or "sound a like" substitutions may have occurred due to the inherent limitations of voice recognition software  Read the chart carefully and recognize, using context, where substitutions have occurred

## 2021-01-11 NOTE — PROGRESS NOTES
CHIEF COMPLAINT:  Chief Complaint   Patient presents with    Right Hand - Follow-up       SUBJECTIVE:  Loan Kaba is a 67y o  year old male who presents for follow-up regarding right long finger trigger finger  Patient was given a cortisone injection on 04/15/2019 and 2020  Patient states that he notes continued locking clicking symptoms  He would like to proceed with surgical intervention at this time  The patient denies any cardiac or pulmonary issues  Denies diabetes  Denies any history of MI, gastric ulcers, or liver issues  Denies blood thinners  Patient is unable to take anti-inflammatories due to stage 3 kidney disease        PAST MEDICAL HISTORY:  Past Medical History:   Diagnosis Date    Azotemia     BPH with obstruction/lower urinary tract symptoms     Hydronephrosis     Incomplete bladder emptying     Urinary retention        PAST SURGICAL HISTORY:  Past Surgical History:   Procedure Laterality Date    CYSTOSCOPY  2014    KNEE SURGERY  2013    ROOT CANAL      TRANSURETHRAL RESECTION OF PROSTATE         FAMILY HISTORY:  Family History   Family history unknown: Yes       SOCIAL HISTORY:  Social History     Tobacco Use    Smoking status: Former Smoker     Quit date: 2011     Years since quittin 9    Smokeless tobacco: Never Used   Substance Use Topics    Alcohol use:  Yes    Drug use: No       MEDICATIONS:    Current Outpatient Medications:     aspirin (ECOTRIN LOW STRENGTH) 81 mg EC tablet, Take 81 mg by mouth daily, Disp: , Rfl:     Azelastine HCl 137 MCG/SPRAY SOLN, , Disp: , Rfl: 0    BREO ELLIPTA 200-25 MCG/INH inhaler, , Disp: , Rfl:     Cholecalciferol 5000 units capsule, Take 2,000 Units by mouth , Disp: , Rfl:     Fluad Quadrivalent 0 5 ML PRSY, inject 0 5 milliliters intramuscularly, Disp: , Rfl:     fluticasone (FLONASE) 50 mcg/act nasal spray, , Disp: , Rfl:     olopatadine (PATANOL) 0 1 % ophthalmic solution, 1 drop, Disp: , Rfl:     PROAIR HFA 108 (90 Base) MCG/ACT inhaler, inhale 1 puff by mouth every 4 hours if needed for wheezing, Disp: , Rfl: 0    rosuvastatin (CRESTOR) 10 MG tablet, , Disp: , Rfl: 0    Triamcinolone Acetonide 55 MCG/ACT AERO, , Disp: , Rfl:     acetaminophen (TYLENOL) 500 mg tablet, Take 1 500mg tablet in the morning prior to surgery, then 1 tablet every 6 hours for 5-7 days  , Disp: 30 tablet, Rfl: 0    ALLERGIES:  Allergies   Allergen Reactions    Other      Cats, Ragweed    Pollen Extract        REVIEW OF SYSTEMS:  Review of Systems  ROS:   General: no fever, no chills  HEENT:  No loss of hearing or eyesight problems  Eyes:  No red eyes  Respiratory:  No coughing, shortness of breath or wheezing  Cardiovascular:  No chest pain, no palpitations  GI:  Abdomen soft nontender, denies nausea  Endocrine:  No muscle weakness, no frequent urination, no excessive thirst  Urinary:  No dysuria, no incontinence  Musculoskeletal: see HPI and PE  SKIN:  No skin rash, no dry skin  Neurological:  No headaches, no confusion  Psychiatric:  No suicide thoughts, no anxiety, no depression  Review of all other systems is negative    VITALS:  Vitals:    01/11/21 1051   BP: 148/83   Pulse: 77       LABS:  HgA1c: No results found for: HGBA1C  BMP: No results found for: GLUCOSE, CALCIUM, NA, K, CO2, CL, BUN, CREATININE    _____________________________________________________  PHYSICAL EXAMINATION:  General: well developed and well nourished, alert, oriented times 3 and appears comfortable  Psychiatric: Normal  HEENT: Trachea Midline, No torticollis  Heart:  Regular rate and rhythm  Lungs:  Clear to auscultation  Pulmonary: No audible wheezing or respiratory distress   Skin: No masses, erythema, lacerations, fluctation, ulcerations  Neurovascular: Sensation Intact to the Median, Ulnar, Radial Nerve, Motor Intact to the Median, Ulnar, Radial Nerve and Pulses Intact    MUSCULOSKELETAL EXAMINATION:  right long finger:  Positive palpable nodule over the A1 pulley  Positive tenderness to palpation over A1 pulley  Positive clicking  Positive catching        ___________________________________________________  STUDIES REVIEWED:  No studies reviewed  PROCEDURES PERFORMED:  Procedures  No Procedures performed today    _____________________________________________________  ASSESSMENT/PLAN:    Right long finger trigger finger  - conservative and operative treatment were discussed with the patient  He would like to proceed with surgical intervention  Detail consent was obtained today   Surgery: right long finger trigger finger release   Anesthesia:  Local   Antibiotics:  None   Medical clearance: not needed   Hand therapy: ordered   Consent: obtained   Patient will take Tylenol as needed for pain    Surgery medication instructions: You will stop eating and drinking at midnight the night before your surgery, but you may continue to take your normal medications with a small sip of water  In the morning on the day of your surgery, we would like you to take the following medication:   Tylenol 500mg one tablet by mouth    After surgery, we would like you to take Tylenol 500 mg one tablet by mouth every 6 hours  (at breakfast, lunch and dinner) for 5-7 days after your surgery  Please take this medication EVERYDAY after surgery for 5-7 days, and not just as needed  Taking this medications after surgery will limit your need for prescription pain medication  We will also prescribe a narcotic pain medication for a limited time after surgery that you can take as needed for moderate or severe pain  The narcotic pain medication may also have Tylenol in it  Please limit Tylenol usage to under 3,000mg a day  Diagnoses and all orders for this visit:    Trigger middle finger of right hand  -     Ambulatory referral to PT/OT hand therapy; Future  -     acetaminophen (TYLENOL) 500 mg tablet;  Take 1 500mg tablet in the morning prior to surgery, then 1 tablet every 6 hours for 5-7 days  -     Case request operating room: Right long finger trigger finger release; Standing  -     Case request operating room: Right long finger trigger finger release    Other orders  -     Fluad Quadrivalent 0 5 ML PRSY; inject 0 5 milliliters intramuscularly  -     Diet NPO; Sips with meds; Standing  -     Height and weight upon arrival; Standing  -     Void on call to OR; Standing  -     lidocaine-epinephrine (XYLOCAINE/EPINEPHRINE) 1 %-1:100,000 injection 10 mL  -     sodium bicarbonate 8 4 % injection 50 mEq          Follow Up:  Return for post op   Work/school status:  No restrictions    To Do Next Visit:  Re-evaluation of current issue and Sutures out      Scribe Attestation    I,:  Dennie Prows, PA-C am acting as a scribe while in the presence of the attending physician :       I,:  Manuel Sevilla MD personally performed the services described in this documentation    as scribed in my presence :           Portions of the record may have been created with voice recognition software  Occasional wrong word or "sound a like" substitutions may have occurred due to the inherent limitations of voice recognition software  Read the chart carefully and recognize, using context, where substitutions have occurred

## 2021-01-11 NOTE — PATIENT INSTRUCTIONS
Surgery medication instructions: You will stop eating and drinking at midnight the night before your surgery, but you may continue to take your normal medications with a small sip of water  In the morning on the day of your surgery, we would like you to take the following medication:   Tylenol 500mg one tablet by mouth    After surgery, we would like you to take Tylenol 500 mg one tablet by mouth every 6 hours  (at breakfast, lunch and dinner) for 5-7 days after your surgery  Please take this medication EVERYDAY after surgery for 5-7 days, and not just as needed  Taking this medications after surgery will limit your need for prescription pain medication  Nas Lozada

## 2021-01-15 NOTE — PRE-PROCEDURE INSTRUCTIONS
Pre-Surgery Instructions:   Medication Instructions    BREO ELLIPTA 200-25 MCG/INH inhaler use day of surgery , if needed    Cholecalciferol 5000 units capsule hold day of surgery    olopatadine (PATANOL) 0 1 % ophthalmic solution may use day of surgery    My Surgical Experience    The following information was developed to assist you to prepare for your operation  What do I need to do before coming to the hospital?   Arrange for a responsible person to drive you to and from the hospital    Arrange care for your children at home  Children are not allowed in the recovery areas of the hospital   Plan to wear clothing that is easy to put on and take off  If you are having shoulder surgery, wear a shirt that buttons or zippers in the front  Bathing  o Shower the evening before and the morning of your surgery with an antibacterial soap  Please refer to the Pre Op Showering Instructions for Surgery Patients Sheet   o Remove nail polish and all body piercing jewelry  o Do not shave any body part for at least 24 hours before surgery-this includes face, arms, legs and upper body  Food  o Nothing to eat or drink after midnight the night before your surgery  This includes candy and chewing gum  o Exception: If your surgery is after 12:00pm (noon), you may have clear liquids such as 7-Up®, ginger ale, apple or cranberry juice, Jell-O®, water, or clear broth until 8:00 am  o Do not drink milk or juice with pulp on the morning before surgery  o Do not drink alcohol 24 hours before surgery  Medicine  o Follow instructions you received from your surgeon about which medicines you may take on the day of surgery  o If instructed to take medicine on the morning of surgery, take pills with just a small sip of water   Call your prescribing doctor for specific infroamtion on what to do if you take insulin    What should I bring to the hospital?    Bring:  Nathan Gallagher or a walker, if you have them, for foot or knee surgery   A list of the daily medicines, vitamins, minerals, herbals and nutritional supplements you take  Include the dosages of medicines and the time you take them each day   Glasses, dentures or hearing aids   Minimal clothing; you will be wearing hospital sleepwear   Photo ID; required to verify your identity   If you have a Living Will or Power of , bring a copy of the documents   If you have an ostomy, bring an extra pouch and any supplies you use    Do not bring   Medicines or inhalers   Money, valuables or jewelry    What other information should I know about the day of surgery?  Notify your surgeons if you develop a cold, sore throat, cough, fever, rash or any other illness   Report to the Ambulatory Surgical/Same Day Surgery Unit   You will be instructed to stop at Registration only if you have not been pre-registered   Inform your  fi they do not stay that they will be asked by the staff to leave a phone number where they can be reached   Be available to be reached before surgery  In the event the operating room schedule changes, you may be asked to come in earlier or later than expected    *It is important to tell your doctor and others involved in your health care if you are taking or have been taking any non-prescription drugs, vitamins, minerals, herbals or other nutritional supplements   Any of these may interact with some food or medicines and cause a reaction

## 2021-01-18 PROBLEM — E66.9 CLASS 1 OBESITY IN ADULT: Status: ACTIVE | Noted: 2021-01-18

## 2021-01-18 PROBLEM — E66.811 CLASS 1 OBESITY IN ADULT: Status: ACTIVE | Noted: 2021-01-18

## 2021-01-19 ENCOUNTER — HOSPITAL ENCOUNTER (OUTPATIENT)
Facility: HOSPITAL | Age: 76
Setting detail: OUTPATIENT SURGERY
Discharge: HOME | End: 2021-01-19
Attending: ORTHOPAEDIC SURGERY | Admitting: ORTHOPAEDIC SURGERY
Payer: MEDICARE

## 2021-01-19 VITALS
TEMPERATURE: 98.2 F | HEIGHT: 68 IN | SYSTOLIC BLOOD PRESSURE: 159 MMHG | RESPIRATION RATE: 20 BRPM | HEART RATE: 75 BPM | OXYGEN SATURATION: 99 % | WEIGHT: 221.56 LBS | DIASTOLIC BLOOD PRESSURE: 92 MMHG | BODY MASS INDEX: 33.58 KG/M2

## 2021-01-19 PROCEDURE — 26055 INCISE FINGER TENDON SHEATH: CPT | Performed by: ORTHOPAEDIC SURGERY

## 2021-01-19 PROCEDURE — 26055 INCISE FINGER TENDON SHEATH: CPT | Performed by: PHYSICIAN ASSISTANT

## 2021-01-19 RX ORDER — MAGNESIUM HYDROXIDE 1200 MG/15ML
LIQUID ORAL AS NEEDED
Status: DISCONTINUED | OUTPATIENT
Start: 2021-01-19 | End: 2021-01-19 | Stop reason: HOSPADM

## 2021-01-19 RX ORDER — TRAMADOL HYDROCHLORIDE 50 MG/1
50 TABLET ORAL EVERY 6 HOURS PRN
Status: CANCELLED | OUTPATIENT
Start: 2021-01-19

## 2021-01-19 RX ORDER — ACETAMINOPHEN 325 MG/1
650 TABLET ORAL EVERY 6 HOURS PRN
Status: CANCELLED | OUTPATIENT
Start: 2021-01-19

## 2021-01-19 RX ORDER — LIDOCAINE HYDROCHLORIDE AND EPINEPHRINE 10; 10 MG/ML; UG/ML
10 INJECTION, SOLUTION INFILTRATION; PERINEURAL ONCE
Status: COMPLETED | OUTPATIENT
Start: 2021-01-19 | End: 2021-01-19

## 2021-01-19 RX ORDER — ONDANSETRON 2 MG/ML
4 INJECTION INTRAMUSCULAR; INTRAVENOUS EVERY 6 HOURS PRN
Status: CANCELLED | OUTPATIENT
Start: 2021-01-19

## 2021-01-19 RX ADMIN — LIDOCAINE HYDROCHLORIDE,EPINEPHRINE BITARTRATE 10 ML: 10; .01 INJECTION, SOLUTION INFILTRATION; PERINEURAL at 11:31

## 2021-01-19 RX ADMIN — SODIUM BICARBONATE 50 MEQ: 84 INJECTION, SOLUTION INTRAVENOUS at 11:33

## 2021-01-19 NOTE — DISCHARGE INSTRUCTIONS
Post Operative Instructions    You have had surgery on your arm today, please read and follow the information below:  · Elevate your hand above your elbow during the next 24-48 hours to help with swelling  · Place your hand and arm over your head with motion at your shoulder three times a day  · Do not apply any cream/ointment/oil to your incisions including antibiotics  · Do not soak your hands in standing water (dishwater, tubs, Jacuzzi's, pools, etc ) until given permission (typically 2-3 weeks after injury)    Call the office at [phone]  if you notice any:  · Increased numbness or tingling of your hand or fingers that is not relieved with elevation  · Increasing pain that is not controlled with medication  · Difficulty chewing, breathing, swallowing  · Chest pains or shortness of breath  · Fever over 101 4 degrees  Bandage: Your therapist will remove your bandage at your first therapy appointment  Motion: Move fingers into a fist 5 times a day, DO NOT move any splinted fingers  Weight bearing status: Avoid heavy lifting (>5 pounds) with the extremity that was operated on until follow up appointment  Normal activities of daily living are OK  Ice: Ice for 10 minutes every hour as needed for swelling x 24 hours  Sling: No sling necessary  Pain medication:    After surgery, we would like you to take Ibuprofen 600mg one tablet by mouth every 6 hours with food (at breakfast, lunch and dinner)  AND Tylenol 500 mg one tablet by mouth every 6 hours  (at breakfast, lunch and dinner) for 5-7 days after your surgery  Please take these medication EVERYDAY after surgery for 5-7 days, and not just as needed  You can take these medications at the same time  Taking these medications after surgery will limit your need for prescription pain medication        If the pain becomes severe, and the pain medication is not alleviating symptoms, The greatest source of pain after surgery is usually a tight dressing due to increased swelling after surgery  If the pain becomes severe after surgery, and the patient medication is NOT alleviating the symptoms, the patient should do the following: The patient should  loosen the top dressing (usually coban or an ace bandage) and loosen/cut the rolled gauze beneath  The 4x4 gauze that is directly covering the incision should remain in place  The splint (if the patient has one) should remain in place  The ace bandage/coban can then be replaced on top in a less constrictive manor  If this does not help relieve the pain/numbness in a few hours, the patient should call our office (number listed below)  and we can have them seen in the office for further evaluation  Follow-up Appointment: 7-10 days with Dr Gala Truong  Occupational Therapy: 1/21/21  AFTER THE FIRST THERAPY APPOINTMENT, the patient may remove the splint/dressing for showering and clean the incision with soap and water  Keep incision dry after washing  Do not expose the incision to dirty water (oceans, pools, hot tubs, etc)     If you need help scheduling Therapy, you can call [phone]        Please call the office at [phone] if you have any questions or concerns regarding your post-operative care

## 2021-01-19 NOTE — INTERVAL H&P NOTE
H&P reviewed  After examining the patient I find no changes in the patients condition since the H&P had been written      Vitals:    01/19/21 1100   BP: 170/89   Pulse: 78   Resp: 17   Temp: 97 8 °F (36 6 °C)   SpO2: 97%

## 2021-01-19 NOTE — OP NOTE
OPERATIVE REPORT    PATIENT NAME: Christiana Garcia     MEDICAL RECORD NO:  [id number]    PROCEDURE DATE:  21    :  1945    SURGEON:  MIO Hernandez , Ph D     [place] Yary: Gloria Villa PA-C    No qualified resident was available to assist for this surgery  A Physician Assistant was used to assist in retraction during pulley release  PREOPERATIVE DIAGNOSIS:    Trigger digit right long finger     POSTOPERATIVE DIAGNOSIS:   Trigger digit right long finger     PROCEDURE PERFORMED:    Trigger release right long finger      ANESTHESIA:  local     COMPLICATIONS: None     TOURNIQUET TIME: none used    DISPOSITION: Patient was sent to the PACU in stable condition  INDICATIONS: Patient is an 67 y o  male with symptomatic triggering of the right long finger   The patient failed conservative management and opted for surgical release  After informing the patient of the risks and benefits of trigger digit release, consent was obtained for surgical intervention  PROCEDURE:  The patient was identified in the preoperative screening area  Consent was signed and verified after identifying the correct operative site  The patient was taken back to the operating room where local was performed  The patients right upper extremity was prepped and draped in normal sterile fashion with chlorhexidine solution  A 1 5cm incision was made centered over the A-1 pulley on the palmar surface of the long finger  The subcutaneous tissue was dissected bluntly down to the level of the flexor sheath taking care to protect the neurovascular structures  The A-1 pulley was identified and was released from proximal to distal   Once release was completed the tendons were examined and found to be intact  Following release, the patient demonstrated flexion and extension of the digits without evidence of triggering    The wound was  irrigated with copious amounts of saline solution and was closed with nylon suture in an interrupted horizontal mattress fashion  Findings: There was no significant longitudinal degenerative tears of the FDS tendon  The synovium appeared normal and was left Undisturbed  The A-1 pulley was moderatley thickened  The patient tolerated the procedure well  The wound was dressed in a sterile dressing  The patient was then sent to the PACU in stable condition         I was present for the entire procedure     Patient Disposition:  PACU      SIGNATURE: Min Levine MD/PhD  DATE: 01/19/21  TIME:  11:33 AM

## 2021-01-21 ENCOUNTER — EVALUATION (OUTPATIENT)
Dept: OCCUPATIONAL THERAPY | Facility: CLINIC | Age: 76
End: 2021-01-21
Payer: MEDICARE

## 2021-01-21 DIAGNOSIS — M65.331 TRIGGER MIDDLE FINGER OF RIGHT HAND: ICD-10-CM

## 2021-01-21 PROCEDURE — 97110 THERAPEUTIC EXERCISES: CPT | Performed by: OCCUPATIONAL THERAPIST

## 2021-01-21 PROCEDURE — 97165 OT EVAL LOW COMPLEX 30 MIN: CPT | Performed by: OCCUPATIONAL THERAPIST

## 2021-01-21 NOTE — PROGRESS NOTES
OT Evaluation     Today's date: 2021  Patient name: Clyde Bolden  : 1945  MRN: [id number]  Referring provider: Manju John PA-C  Dx: No diagnosis found  Assessment  Assessment details: Cristine Palma presents with stiffness S/P trigger finger release R LF- at this point he wishes to perform HEP only, return PRN    Goals  STG) Motion increased by 25% in 4-6 weeks  STG) Strength/Motor skills improved by 25% in 4-6 weeks  STG) SwellingEdema improved by 25% in 4-6 weeks  STG) Pain decreased by 25% in 4-6 weeks    LTG) ADL and IADL skills improved   LTG) Work skills improved  LTG) Leisure skills improved    Patient Goals: To have better finger motion    Goals, plan of care and treatment condition discussed with patient  Patient expresses their understanding and questions regarding these issues were addressed  Plan  Planned therapy interventions: joint mobilization, manual therapy, Sandoval taping, motor coordination training, neuromuscular re-education, orthotic fitting/training, therapeutic activities, therapeutic exercise, fine motor coordination training and functional ROM exercises        Subjective Evaluation    History of Present Illness  Mechanism of injury: Cristine Palma is S/P R LF Trigger finger release on 21  He had 2x prior cortisone shots and a history of triggering for ~ 2 years  Pain  Current pain ratin  At worst pain ratin    Hand dominance: right          Objective     Observations     Additional Observation Details  Incision intact, no S/S of infection    Active Range of Motion     Right Wrist   Wrist flexion: 65 degrees   Wrist extension: 75 degrees     Left Digits    Flexion   Middle     MCP: 80    PIP: 80    DIP: 52  Extension   Middle     MCP: 6    PIP: -18    DIP: 0    Strength/Myotome Testing     Additional Strength Details  POD 2 grasp NT    Tests     Right Wrist/Hand   Positive Bunnel-Littler       Additional Tests Details  Denies N/T Precautions: Trigger finger release  HEP: Blocking, tge, PROM, Intrinsic stretch, reverse blocking, digit extension      Manuals                                                                 Neuro Re-Ed                                                                                                        Ther Ex                                                                                                                     Ther Activity                                       Gait Training                                       Modalities

## 2021-01-31 ENCOUNTER — TELEPHONE (OUTPATIENT)
Dept: OBGYN CLINIC | Facility: MEDICAL CENTER | Age: 76
End: 2021-01-31

## 2021-01-31 NOTE — TELEPHONE ENCOUNTER
lmom for pt to cb and rs for 2/1 with dr Zoe Garland due to the impending weather the office will be closed

## 2021-02-04 ENCOUNTER — OFFICE VISIT (OUTPATIENT)
Dept: OBGYN CLINIC | Facility: CLINIC | Age: 76
End: 2021-02-04

## 2021-02-04 VITALS
SYSTOLIC BLOOD PRESSURE: 142 MMHG | HEIGHT: 68 IN | HEART RATE: 77 BPM | BODY MASS INDEX: 33.63 KG/M2 | DIASTOLIC BLOOD PRESSURE: 88 MMHG | WEIGHT: 221.9 LBS

## 2021-02-04 DIAGNOSIS — Z48.89 AFTERCARE FOLLOWING SURGERY: Primary | ICD-10-CM

## 2021-02-04 PROCEDURE — 99024 POSTOP FOLLOW-UP VISIT: CPT | Performed by: ORTHOPAEDIC SURGERY

## 2021-02-04 NOTE — PROGRESS NOTES
SUBJECTIVE:  Kb Yarbrough is a 67y o  year old male who presents for follow up after surgery, right long finger trigger release performed on  1/19/2021  Today patient   Stats that he has continued his home exercise program   Patient does not have any catching or locking of his long finger although does complain of some stiffness  VITALS:  Vitals:    02/04/21 1038   BP: 142/88   Pulse: 77       PHYSICAL EXAMINATION:  General: well developed and well nourished, alert, oriented times 3 and appears comfortable  Psychiatric: Normal    MUSCULOSKELETAL EXAMINATION:  Right hand   Incision: Clean, dry, with sutures intact  Range of Motion: normal no catching or locking   Neurovascular status: Neuro intact, good cap refill      STUDIES REVIEWED:  No studies reviewed  PROCEDURES PERFORMED:  Procedures  No Procedures performed today      ASSESSMENT/PLAN:    S/P right long finger trigger release   * Pt was advised to stretch as shown in the office   * Pt was advised to apply heat prior to stretching to  * Pt was adivsed that he will likely develop some soreness at the incision site as scar tissue forms   * Pt was advised to call the office if he has any questions or concerns    FOLLOW UP:  Return if symptoms worsen or fail to improve        TO DO AT NEXT VISIT:  Re-evaluation of current issue      Scribe Attestation    I,:  Fidelina Brannon am acting as a scribe while in the presence of the attending physician :       I,:  Jessica Pickering MD personally performed the services described in this documentation    as scribed in my presence :

## 2021-10-06 ENCOUNTER — OFFICE VISIT (OUTPATIENT)
Dept: GASTROENTEROLOGY | Facility: AMBULARY SURGERY CENTER | Age: 76
End: 2021-10-06
Payer: MEDICARE

## 2021-10-06 VITALS
SYSTOLIC BLOOD PRESSURE: 148 MMHG | DIASTOLIC BLOOD PRESSURE: 84 MMHG | WEIGHT: 226 LBS | HEIGHT: 68 IN | BODY MASS INDEX: 34.25 KG/M2

## 2021-10-06 DIAGNOSIS — R10.13 DYSPEPSIA: Primary | ICD-10-CM

## 2021-10-06 PROCEDURE — 99204 OFFICE O/P NEW MOD 45 MIN: CPT | Performed by: INTERNAL MEDICINE

## 2021-10-06 RX ORDER — PANTOPRAZOLE SODIUM 40 MG/1
40 TABLET, DELAYED RELEASE ORAL DAILY
Qty: 30 TABLET | Refills: 11 | Status: SHIPPED | OUTPATIENT
Start: 2021-10-06 | End: 2021-10-30 | Stop reason: HOSPADM

## 2021-10-06 RX ORDER — LATANOPROST 50 UG/ML
SOLUTION/ DROPS OPHTHALMIC
COMMUNITY
Start: 2021-09-22

## 2021-10-06 RX ORDER — LATANOPROST 50 UG/ML
1 SOLUTION/ DROPS OPHTHALMIC
COMMUNITY
Start: 2021-06-09 | End: 2021-10-27 | Stop reason: SDUPTHER

## 2021-10-13 ENCOUNTER — TELEPHONE (OUTPATIENT)
Dept: GASTROENTEROLOGY | Facility: AMBULARY SURGERY CENTER | Age: 76
End: 2021-10-13

## 2021-10-26 ENCOUNTER — OFFICE VISIT (OUTPATIENT)
Dept: GASTROENTEROLOGY | Facility: AMBULARY SURGERY CENTER | Age: 76
End: 2021-10-26
Payer: MEDICARE

## 2021-10-26 ENCOUNTER — NURSE TRIAGE (OUTPATIENT)
Dept: OTHER | Facility: OTHER | Age: 76
End: 2021-10-26

## 2021-10-26 VITALS
SYSTOLIC BLOOD PRESSURE: 140 MMHG | HEIGHT: 68 IN | BODY MASS INDEX: 33.71 KG/M2 | WEIGHT: 222.4 LBS | DIASTOLIC BLOOD PRESSURE: 84 MMHG

## 2021-10-26 DIAGNOSIS — R06.6 HICCUPS: ICD-10-CM

## 2021-10-26 DIAGNOSIS — R10.13 DYSPEPSIA: Primary | ICD-10-CM

## 2021-10-26 PROCEDURE — 1124F ACP DISCUSS-NO DSCNMKR DOCD: CPT | Performed by: INTERNAL MEDICINE

## 2021-10-26 PROCEDURE — 99214 OFFICE O/P EST MOD 30 MIN: CPT | Performed by: INTERNAL MEDICINE

## 2021-10-26 RX ORDER — SUCRALFATE ORAL 1 G/10ML
1 SUSPENSION ORAL 4 TIMES DAILY
Qty: 420 ML | Refills: 3 | Status: SHIPPED | OUTPATIENT
Start: 2021-10-26 | End: 2022-01-17

## 2021-10-27 ENCOUNTER — HOSPITAL ENCOUNTER (INPATIENT)
Facility: HOSPITAL | Age: 76
LOS: 2 days | Discharge: HOME | DRG: 204 | End: 2021-10-30
Attending: EMERGENCY MEDICINE | Admitting: INTERNAL MEDICINE
Payer: MEDICARE

## 2021-10-27 ENCOUNTER — APPOINTMENT (EMERGENCY)
Dept: CT IMAGING | Facility: HOSPITAL | Age: 76
DRG: 204 | End: 2021-10-27
Payer: MEDICARE

## 2021-10-27 DIAGNOSIS — R06.6 HICCUPS: ICD-10-CM

## 2021-10-27 DIAGNOSIS — R06.6 INTRACTABLE HICCUPS: Primary | ICD-10-CM

## 2021-10-27 DIAGNOSIS — R10.13 DYSPEPSIA: ICD-10-CM

## 2021-10-27 DIAGNOSIS — R10.13 EPIGASTRIC DISCOMFORT: ICD-10-CM

## 2021-10-27 LAB
ALBUMIN SERPL BCP-MCNC: 3.4 G/DL (ref 3.5–5)
ALP SERPL-CCNC: 69 U/L (ref 46–116)
ALT SERPL W P-5'-P-CCNC: 24 U/L (ref 12–78)
ANION GAP SERPL CALCULATED.3IONS-SCNC: 8 MMOL/L (ref 4–13)
AST SERPL W P-5'-P-CCNC: 20 U/L (ref 5–45)
BASOPHILS # BLD AUTO: 0.03 THOUSANDS/ΜL (ref 0–0.1)
BASOPHILS NFR BLD AUTO: 1 % (ref 0–1)
BILIRUB SERPL-MCNC: 0.65 MG/DL (ref 0.2–1)
BUN SERPL-MCNC: 21 MG/DL (ref 5–25)
CALCIUM ALBUM COR SERPL-MCNC: 8.9 MG/DL (ref 8.3–10.1)
CALCIUM SERPL-MCNC: 8.4 MG/DL (ref 8.3–10.1)
CHLORIDE SERPL-SCNC: 100 MMOL/L (ref 100–108)
CO2 SERPL-SCNC: 25 MMOL/L (ref 21–32)
CREAT SERPL-MCNC: 1.91 MG/DL (ref 0.6–1.3)
EOSINOPHIL # BLD AUTO: 0.16 THOUSAND/ΜL (ref 0–0.61)
EOSINOPHIL NFR BLD AUTO: 3 % (ref 0–6)
ERYTHROCYTE [DISTWIDTH] IN BLOOD BY AUTOMATED COUNT: 13 % (ref 11.6–15.1)
GFR SERPL CREATININE-BSD FRML MDRD: 33 ML/MIN/1.73SQ M
GLUCOSE SERPL-MCNC: 114 MG/DL (ref 65–140)
HCT VFR BLD AUTO: 47 % (ref 36.5–49.3)
HGB BLD-MCNC: 15.4 G/DL (ref 12–17)
IMM GRANULOCYTES # BLD AUTO: 0.04 THOUSAND/UL (ref 0–0.2)
IMM GRANULOCYTES NFR BLD AUTO: 1 % (ref 0–2)
LACTATE SERPL-SCNC: 0.6 MMOL/L (ref 0.5–2)
LIPASE SERPL-CCNC: 227 U/L (ref 73–393)
LYMPHOCYTES # BLD AUTO: 1.35 THOUSANDS/ΜL (ref 0.6–4.47)
LYMPHOCYTES NFR BLD AUTO: 21 % (ref 14–44)
MAGNESIUM SERPL-MCNC: 1.7 MG/DL (ref 1.6–2.6)
MCH RBC QN AUTO: 29.8 PG (ref 26.8–34.3)
MCHC RBC AUTO-ENTMCNC: 32.8 G/DL (ref 31.4–37.4)
MCV RBC AUTO: 91 FL (ref 82–98)
MONOCYTES # BLD AUTO: 0.49 THOUSAND/ΜL (ref 0.17–1.22)
MONOCYTES NFR BLD AUTO: 8 % (ref 4–12)
NEUTROPHILS # BLD AUTO: 4.43 THOUSANDS/ΜL (ref 1.85–7.62)
NEUTS SEG NFR BLD AUTO: 66 % (ref 43–75)
NRBC BLD AUTO-RTO: 0 /100 WBCS
PHOSPHATE SERPL-MCNC: 3.1 MG/DL (ref 2.3–4.1)
PLATELET # BLD AUTO: 217 THOUSANDS/UL (ref 149–390)
PMV BLD AUTO: 9.5 FL (ref 8.9–12.7)
POTASSIUM SERPL-SCNC: 3.9 MMOL/L (ref 3.5–5.3)
PROT SERPL-MCNC: 6.7 G/DL (ref 6.4–8.2)
RBC # BLD AUTO: 5.16 MILLION/UL (ref 3.88–5.62)
SODIUM SERPL-SCNC: 133 MMOL/L (ref 136–145)
TROPONIN I SERPL-MCNC: <0.02 NG/ML
WBC # BLD AUTO: 6.5 THOUSAND/UL (ref 4.31–10.16)

## 2021-10-27 PROCEDURE — 36415 COLL VENOUS BLD VENIPUNCTURE: CPT | Performed by: EMERGENCY MEDICINE

## 2021-10-27 PROCEDURE — 96372 THER/PROPH/DIAG INJ SC/IM: CPT

## 2021-10-27 PROCEDURE — 71260 CT THORAX DX C+: CPT

## 2021-10-27 PROCEDURE — 80053 COMPREHEN METABOLIC PANEL: CPT | Performed by: EMERGENCY MEDICINE

## 2021-10-27 PROCEDURE — 84484 ASSAY OF TROPONIN QUANT: CPT | Performed by: EMERGENCY MEDICINE

## 2021-10-27 PROCEDURE — 83690 ASSAY OF LIPASE: CPT | Performed by: EMERGENCY MEDICINE

## 2021-10-27 PROCEDURE — 96374 THER/PROPH/DIAG INJ IV PUSH: CPT

## 2021-10-27 PROCEDURE — 99285 EMERGENCY DEPT VISIT HI MDM: CPT

## 2021-10-27 PROCEDURE — 99285 EMERGENCY DEPT VISIT HI MDM: CPT | Performed by: EMERGENCY MEDICINE

## 2021-10-27 PROCEDURE — 93005 ELECTROCARDIOGRAM TRACING: CPT

## 2021-10-27 PROCEDURE — 83735 ASSAY OF MAGNESIUM: CPT | Performed by: EMERGENCY MEDICINE

## 2021-10-27 PROCEDURE — 85025 COMPLETE CBC W/AUTO DIFF WBC: CPT | Performed by: EMERGENCY MEDICINE

## 2021-10-27 PROCEDURE — G1004 CDSM NDSC: HCPCS

## 2021-10-27 PROCEDURE — 74177 CT ABD & PELVIS W/CONTRAST: CPT

## 2021-10-27 PROCEDURE — 84100 ASSAY OF PHOSPHORUS: CPT | Performed by: EMERGENCY MEDICINE

## 2021-10-27 PROCEDURE — 81003 URINALYSIS AUTO W/O SCOPE: CPT | Performed by: EMERGENCY MEDICINE

## 2021-10-27 PROCEDURE — 83605 ASSAY OF LACTIC ACID: CPT | Performed by: EMERGENCY MEDICINE

## 2021-10-27 PROCEDURE — 96361 HYDRATE IV INFUSION ADD-ON: CPT

## 2021-10-27 RX ORDER — FAMOTIDINE 20 MG/1
20 TABLET, FILM COATED ORAL ONCE
Status: COMPLETED | OUTPATIENT
Start: 2021-10-27 | End: 2021-10-27

## 2021-10-27 RX ORDER — DIAZEPAM 5 MG/ML
2.5 INJECTION, SOLUTION INTRAMUSCULAR; INTRAVENOUS ONCE
Status: COMPLETED | OUTPATIENT
Start: 2021-10-27 | End: 2021-10-27

## 2021-10-27 RX ORDER — MAGNESIUM HYDROXIDE/ALUMINUM HYDROXICE/SIMETHICONE 120; 1200; 1200 MG/30ML; MG/30ML; MG/30ML
30 SUSPENSION ORAL ONCE
Status: COMPLETED | OUTPATIENT
Start: 2021-10-27 | End: 2021-10-27

## 2021-10-27 RX ORDER — DIAZEPAM 5 MG/ML
2.5 INJECTION, SOLUTION INTRAMUSCULAR; INTRAVENOUS ONCE
Status: DISCONTINUED | OUTPATIENT
Start: 2021-10-27 | End: 2021-10-27

## 2021-10-27 RX ORDER — DICYCLOMINE HCL 20 MG
20 TABLET ORAL ONCE
Status: COMPLETED | OUTPATIENT
Start: 2021-10-27 | End: 2021-10-27

## 2021-10-27 RX ORDER — HALOPERIDOL 5 MG/ML
5 INJECTION INTRAMUSCULAR ONCE
Status: COMPLETED | OUTPATIENT
Start: 2021-10-27 | End: 2021-10-27

## 2021-10-27 RX ORDER — METOCLOPRAMIDE HYDROCHLORIDE 5 MG/ML
5 INJECTION INTRAMUSCULAR; INTRAVENOUS ONCE
Status: DISCONTINUED | OUTPATIENT
Start: 2021-10-27 | End: 2021-10-27

## 2021-10-27 RX ORDER — LIDOCAINE HYDROCHLORIDE 20 MG/ML
15 SOLUTION OROPHARYNGEAL ONCE
Status: COMPLETED | OUTPATIENT
Start: 2021-10-27 | End: 2021-10-27

## 2021-10-27 RX ADMIN — IOHEXOL 100 ML: 350 INJECTION, SOLUTION INTRAVENOUS at 22:57

## 2021-10-27 RX ADMIN — HALOPERIDOL LACTATE 5 MG: 5 INJECTION, SOLUTION INTRAMUSCULAR at 22:02

## 2021-10-27 RX ADMIN — DICYCLOMINE HYDROCHLORIDE 20 MG: 20 TABLET ORAL at 21:09

## 2021-10-27 RX ADMIN — FAMOTIDINE 20 MG: 20 TABLET, FILM COATED ORAL at 21:09

## 2021-10-27 RX ADMIN — LIDOCAINE HYDROCHLORIDE 15 ML: 20 SOLUTION ORAL; TOPICAL at 21:10

## 2021-10-27 RX ADMIN — SODIUM CHLORIDE 1000 ML: 0.9 INJECTION, SOLUTION INTRAVENOUS at 21:39

## 2021-10-27 RX ADMIN — ALUMINA, MAGNESIA, AND SIMETHICONE ORAL SUSPENSION REGULAR STRENGTH 30 ML: 1200; 1200; 120 SUSPENSION ORAL at 21:10

## 2021-10-27 RX ADMIN — DIAZEPAM 2.5 MG: 10 INJECTION, SOLUTION INTRAMUSCULAR; INTRAVENOUS at 22:24

## 2021-10-28 PROBLEM — N18.30 CKD (CHRONIC KIDNEY DISEASE) STAGE 3, GFR 30-59 ML/MIN (HCC): Status: ACTIVE | Noted: 2021-10-28

## 2021-10-28 LAB
ANION GAP SERPL CALCULATED.3IONS-SCNC: 8 MMOL/L (ref 4–13)
BILIRUB UR QL STRIP: NEGATIVE
BUN SERPL-MCNC: 18 MG/DL (ref 5–25)
CALCIUM SERPL-MCNC: 8.5 MG/DL (ref 8.3–10.1)
CHLORIDE SERPL-SCNC: 105 MMOL/L (ref 100–108)
CLARITY UR: CLEAR
CO2 SERPL-SCNC: 26 MMOL/L (ref 21–32)
COLOR UR: NORMAL
CREAT SERPL-MCNC: 1.55 MG/DL (ref 0.6–1.3)
ERYTHROCYTE [DISTWIDTH] IN BLOOD BY AUTOMATED COUNT: 12.9 % (ref 11.6–15.1)
GFR SERPL CREATININE-BSD FRML MDRD: 43 ML/MIN/1.73SQ M
GLUCOSE SERPL-MCNC: 98 MG/DL (ref 65–140)
GLUCOSE UR STRIP-MCNC: NEGATIVE MG/DL
HCT VFR BLD AUTO: 47 % (ref 36.5–49.3)
HGB BLD-MCNC: 15.9 G/DL (ref 12–17)
HGB UR QL STRIP.AUTO: NEGATIVE
KETONES UR STRIP-MCNC: NEGATIVE MG/DL
LEUKOCYTE ESTERASE UR QL STRIP: NEGATIVE
MCH RBC QN AUTO: 30.6 PG (ref 26.8–34.3)
MCHC RBC AUTO-ENTMCNC: 33.8 G/DL (ref 31.4–37.4)
MCV RBC AUTO: 91 FL (ref 82–98)
NITRITE UR QL STRIP: NEGATIVE
PH UR STRIP.AUTO: 6 [PH]
PLATELET # BLD AUTO: 208 THOUSANDS/UL (ref 149–390)
PMV BLD AUTO: 9.7 FL (ref 8.9–12.7)
POTASSIUM SERPL-SCNC: 3.9 MMOL/L (ref 3.5–5.3)
PROT UR STRIP-MCNC: NEGATIVE MG/DL
RBC # BLD AUTO: 5.19 MILLION/UL (ref 3.88–5.62)
SODIUM SERPL-SCNC: 139 MMOL/L (ref 136–145)
SP GR UR STRIP.AUTO: <=1.005 (ref 1–1.03)
UROBILINOGEN UR QL STRIP.AUTO: 0.2 E.U./DL
WBC # BLD AUTO: 6.11 THOUSAND/UL (ref 4.31–10.16)

## 2021-10-28 PROCEDURE — 85027 COMPLETE CBC AUTOMATED: CPT | Performed by: PHYSICIAN ASSISTANT

## 2021-10-28 PROCEDURE — 80048 BASIC METABOLIC PNL TOTAL CA: CPT | Performed by: PHYSICIAN ASSISTANT

## 2021-10-28 PROCEDURE — 99222 1ST HOSP IP/OBS MODERATE 55: CPT | Performed by: INTERNAL MEDICINE

## 2021-10-28 PROCEDURE — 36415 COLL VENOUS BLD VENIPUNCTURE: CPT | Performed by: PHYSICIAN ASSISTANT

## 2021-10-28 PROCEDURE — 99220 PR INITIAL OBSERVATION CARE/DAY 70 MINUTES: CPT | Performed by: INTERNAL MEDICINE

## 2021-10-28 RX ORDER — CHLORPROMAZINE HYDROCHLORIDE 25 MG/ML
25 INJECTION INTRAMUSCULAR EVERY 6 HOURS PRN
Status: DISCONTINUED | OUTPATIENT
Start: 2021-10-28 | End: 2021-10-28

## 2021-10-28 RX ORDER — CHLORPROMAZINE HYDROCHLORIDE 25 MG/1
25 TABLET, FILM COATED ORAL ONCE
Status: COMPLETED | OUTPATIENT
Start: 2021-10-28 | End: 2021-10-28

## 2021-10-28 RX ORDER — SUCRALFATE ORAL 1 G/10ML
1000 SUSPENSION ORAL 4 TIMES DAILY
Status: DISCONTINUED | OUTPATIENT
Start: 2021-10-28 | End: 2021-10-28 | Stop reason: RX

## 2021-10-28 RX ORDER — CHLORPROMAZINE HYDROCHLORIDE 25 MG/1
25 TABLET, FILM COATED ORAL EVERY 6 HOURS PRN
Status: DISCONTINUED | OUTPATIENT
Start: 2021-10-28 | End: 2021-10-28

## 2021-10-28 RX ORDER — FLUTICASONE FUROATE AND VILANTEROL 200; 25 UG/1; UG/1
1 POWDER RESPIRATORY (INHALATION) DAILY
Status: DISCONTINUED | OUTPATIENT
Start: 2021-10-28 | End: 2021-10-30 | Stop reason: HOSPADM

## 2021-10-28 RX ORDER — LATANOPROST 50 UG/ML
1 SOLUTION/ DROPS OPHTHALMIC
Status: DISCONTINUED | OUTPATIENT
Start: 2021-10-28 | End: 2021-10-30 | Stop reason: HOSPADM

## 2021-10-28 RX ORDER — HEPARIN SODIUM 5000 [USP'U]/ML
5000 INJECTION, SOLUTION INTRAVENOUS; SUBCUTANEOUS EVERY 8 HOURS SCHEDULED
Status: DISCONTINUED | OUTPATIENT
Start: 2021-10-28 | End: 2021-10-30 | Stop reason: HOSPADM

## 2021-10-28 RX ORDER — PANTOPRAZOLE SODIUM 40 MG/1
40 TABLET, DELAYED RELEASE ORAL
Status: DISCONTINUED | OUTPATIENT
Start: 2021-10-28 | End: 2021-10-29

## 2021-10-28 RX ORDER — CHLORPROMAZINE HYDROCHLORIDE 25 MG/1
50 TABLET, FILM COATED ORAL EVERY 6 HOURS PRN
Status: DISCONTINUED | OUTPATIENT
Start: 2021-10-28 | End: 2021-10-30 | Stop reason: HOSPADM

## 2021-10-28 RX ORDER — ACETAMINOPHEN 325 MG/1
650 TABLET ORAL EVERY 6 HOURS PRN
Status: DISCONTINUED | OUTPATIENT
Start: 2021-10-28 | End: 2021-10-30 | Stop reason: HOSPADM

## 2021-10-28 RX ORDER — SODIUM CHLORIDE 9 MG/ML
50 INJECTION, SOLUTION INTRAVENOUS CONTINUOUS
Status: DISCONTINUED | OUTPATIENT
Start: 2021-10-28 | End: 2021-10-30 | Stop reason: HOSPADM

## 2021-10-28 RX ORDER — SUCRALFATE 1 G/1
1 TABLET ORAL
Status: DISCONTINUED | OUTPATIENT
Start: 2021-10-28 | End: 2021-10-30 | Stop reason: HOSPADM

## 2021-10-28 RX ORDER — MELATONIN
2000 DAILY
Status: DISCONTINUED | OUTPATIENT
Start: 2021-10-28 | End: 2021-10-30 | Stop reason: HOSPADM

## 2021-10-28 RX ORDER — PANTOPRAZOLE SODIUM 40 MG/1
40 TABLET, DELAYED RELEASE ORAL DAILY
Status: DISCONTINUED | OUTPATIENT
Start: 2021-10-28 | End: 2021-10-28

## 2021-10-28 RX ADMIN — HEPARIN SODIUM 5000 UNITS: 5000 INJECTION INTRAVENOUS; SUBCUTANEOUS at 06:14

## 2021-10-28 RX ADMIN — SUCRALFATE 1 G: 1 TABLET ORAL at 11:46

## 2021-10-28 RX ADMIN — Medication 2000 UNITS: at 09:26

## 2021-10-28 RX ADMIN — CHLORPROMAZINE HYDROCHLORIDE 25 MG: 25 TABLET, SUGAR COATED ORAL at 11:49

## 2021-10-28 RX ADMIN — CHLORPROMAZINE HYDROCHLORIDE 25 MG: 25 TABLET, SUGAR COATED ORAL at 17:46

## 2021-10-28 RX ADMIN — CHLORPROMAZINE HYDROCHLORIDE 25 MG: 25 INJECTION INTRAMUSCULAR at 03:09

## 2021-10-28 RX ADMIN — HEPARIN SODIUM 5000 UNITS: 5000 INJECTION INTRAVENOUS; SUBCUTANEOUS at 21:25

## 2021-10-28 RX ADMIN — SUCRALFATE 1 G: 1 TABLET ORAL at 09:26

## 2021-10-28 RX ADMIN — SODIUM CHLORIDE 50 ML/HR: 0.9 INJECTION, SOLUTION INTRAVENOUS at 03:41

## 2021-10-28 RX ADMIN — PANTOPRAZOLE SODIUM 40 MG: 40 TABLET, DELAYED RELEASE ORAL at 09:26

## 2021-10-28 RX ADMIN — SUCRALFATE 1 G: 1 TABLET ORAL at 17:45

## 2021-10-28 RX ADMIN — FLUTICASONE FUROATE AND VILANTEROL TRIFENATATE 1 PUFF: 200; 25 POWDER RESPIRATORY (INHALATION) at 11:46

## 2021-10-28 RX ADMIN — CHLORPROMAZINE HYDROCHLORIDE 25 MG: 25 TABLET, SUGAR COATED ORAL at 21:16

## 2021-10-28 RX ADMIN — SODIUM CHLORIDE 50 ML/HR: 0.9 INJECTION, SOLUTION INTRAVENOUS at 21:19

## 2021-10-28 RX ADMIN — LATANOPROST 1 DROP: 50 SOLUTION OPHTHALMIC at 21:16

## 2021-10-28 RX ADMIN — SUCRALFATE 1 G: 1 TABLET ORAL at 21:16

## 2021-10-29 ENCOUNTER — ANESTHESIA (INPATIENT)
Dept: GASTROENTEROLOGY | Facility: HOSPITAL | Age: 76
DRG: 204 | End: 2021-10-29
Payer: MEDICARE

## 2021-10-29 ENCOUNTER — APPOINTMENT (INPATIENT)
Dept: GASTROENTEROLOGY | Facility: HOSPITAL | Age: 76
DRG: 204 | End: 2021-10-29
Attending: INTERNAL MEDICINE
Payer: MEDICARE

## 2021-10-29 ENCOUNTER — ANESTHESIA EVENT (INPATIENT)
Dept: GASTROENTEROLOGY | Facility: HOSPITAL | Age: 76
DRG: 204 | End: 2021-10-29
Payer: MEDICARE

## 2021-10-29 PROBLEM — R03.0 ELEVATED BP WITHOUT DIAGNOSIS OF HYPERTENSION: Status: ACTIVE | Noted: 2021-10-29

## 2021-10-29 LAB
ANION GAP SERPL CALCULATED.3IONS-SCNC: 9 MMOL/L (ref 4–13)
ATRIAL RATE: 133 BPM
BUN SERPL-MCNC: 17 MG/DL (ref 5–25)
CALCIUM SERPL-MCNC: 9.3 MG/DL (ref 8.3–10.1)
CHLORIDE SERPL-SCNC: 104 MMOL/L (ref 100–108)
CO2 SERPL-SCNC: 27 MMOL/L (ref 21–32)
CREAT SERPL-MCNC: 1.71 MG/DL (ref 0.6–1.3)
GFR SERPL CREATININE-BSD FRML MDRD: 38 ML/MIN/1.73SQ M
GLUCOSE SERPL-MCNC: 101 MG/DL (ref 65–140)
P AXIS: 78 DEGREES
POTASSIUM SERPL-SCNC: 4.4 MMOL/L (ref 3.5–5.3)
QRS AXIS: -15 DEGREES
QRSD INTERVAL: 86 MS
QT INTERVAL: 414 MS
QTC INTERVAL: 406 MS
SODIUM SERPL-SCNC: 140 MMOL/L (ref 136–145)
T WAVE AXIS: 18 DEGREES
VENTRICULAR RATE: 58 BPM

## 2021-10-29 PROCEDURE — 88305 TISSUE EXAM BY PATHOLOGIST: CPT | Performed by: PATHOLOGY

## 2021-10-29 PROCEDURE — 80048 BASIC METABOLIC PNL TOTAL CA: CPT | Performed by: INTERNAL MEDICINE

## 2021-10-29 PROCEDURE — 88342 IMHCHEM/IMCYTCHM 1ST ANTB: CPT | Performed by: PATHOLOGY

## 2021-10-29 PROCEDURE — 93010 ELECTROCARDIOGRAM REPORT: CPT | Performed by: INTERNAL MEDICINE

## 2021-10-29 PROCEDURE — 0DB78ZX EXCISION OF STOMACH, PYLORUS, VIA NATURAL OR ARTIFICIAL OPENING ENDOSCOPIC, DIAGNOSTIC: ICD-10-PCS | Performed by: INTERNAL MEDICINE

## 2021-10-29 PROCEDURE — 43239 EGD BIOPSY SINGLE/MULTIPLE: CPT | Performed by: INTERNAL MEDICINE

## 2021-10-29 PROCEDURE — 99232 SBSQ HOSP IP/OBS MODERATE 35: CPT | Performed by: PHYSICIAN ASSISTANT

## 2021-10-29 RX ORDER — HYDRALAZINE HYDROCHLORIDE 20 MG/ML
5 INJECTION INTRAMUSCULAR; INTRAVENOUS EVERY 4 HOURS PRN
Status: DISCONTINUED | OUTPATIENT
Start: 2021-10-29 | End: 2021-10-30 | Stop reason: HOSPADM

## 2021-10-29 RX ORDER — LIDOCAINE HYDROCHLORIDE 10 MG/ML
INJECTION, SOLUTION EPIDURAL; INFILTRATION; INTRACAUDAL; PERINEURAL AS NEEDED
Status: DISCONTINUED | OUTPATIENT
Start: 2021-10-29 | End: 2021-10-29

## 2021-10-29 RX ORDER — PROPOFOL 10 MG/ML
INJECTION, EMULSION INTRAVENOUS AS NEEDED
Status: DISCONTINUED | OUTPATIENT
Start: 2021-10-29 | End: 2021-10-29

## 2021-10-29 RX ADMIN — HEPARIN SODIUM 5000 UNITS: 5000 INJECTION INTRAVENOUS; SUBCUTANEOUS at 21:13

## 2021-10-29 RX ADMIN — SODIUM CHLORIDE: 0.9 INJECTION, SOLUTION INTRAVENOUS at 16:04

## 2021-10-29 RX ADMIN — FLUTICASONE FUROATE AND VILANTEROL TRIFENATATE 1 PUFF: 200; 25 POWDER RESPIRATORY (INHALATION) at 09:15

## 2021-10-29 RX ADMIN — LIDOCAINE HYDROCHLORIDE 50 MG: 10 INJECTION, SOLUTION EPIDURAL; INFILTRATION; INTRACAUDAL at 16:14

## 2021-10-29 RX ADMIN — PANTOPRAZOLE SODIUM 40 MG: 40 TABLET, DELAYED RELEASE ORAL at 09:14

## 2021-10-29 RX ADMIN — PROPOFOL 30 MG: 10 INJECTION, EMULSION INTRAVENOUS at 16:16

## 2021-10-29 RX ADMIN — ACETAMINOPHEN 650 MG: 325 TABLET, FILM COATED ORAL at 21:13

## 2021-10-29 RX ADMIN — SODIUM CHLORIDE 50 ML/HR: 0.9 INJECTION, SOLUTION INTRAVENOUS at 18:55

## 2021-10-29 RX ADMIN — PROPOFOL 90 MG: 10 INJECTION, EMULSION INTRAVENOUS at 16:14

## 2021-10-29 RX ADMIN — LATANOPROST 1 DROP: 50 SOLUTION OPHTHALMIC at 21:18

## 2021-10-29 RX ADMIN — SUCRALFATE 1 G: 1 TABLET ORAL at 10:58

## 2021-10-29 RX ADMIN — SUCRALFATE 1 G: 1 TABLET ORAL at 21:13

## 2021-10-29 RX ADMIN — Medication 2000 UNITS: at 09:14

## 2021-10-29 RX ADMIN — SUCRALFATE 1 G: 1 TABLET ORAL at 17:34

## 2021-10-29 RX ADMIN — SUCRALFATE 1 G: 1 TABLET ORAL at 09:13

## 2021-10-30 VITALS
TEMPERATURE: 98.6 F | RESPIRATION RATE: 16 BRPM | HEART RATE: 94 BPM | BODY MASS INDEX: 33.08 KG/M2 | SYSTOLIC BLOOD PRESSURE: 159 MMHG | WEIGHT: 218.26 LBS | DIASTOLIC BLOOD PRESSURE: 82 MMHG | HEIGHT: 68 IN | OXYGEN SATURATION: 96 %

## 2021-10-30 LAB
ANION GAP SERPL CALCULATED.3IONS-SCNC: 8 MMOL/L (ref 4–13)
BUN SERPL-MCNC: 19 MG/DL (ref 5–25)
CALCIUM SERPL-MCNC: 8.8 MG/DL (ref 8.3–10.1)
CHLORIDE SERPL-SCNC: 103 MMOL/L (ref 100–108)
CO2 SERPL-SCNC: 28 MMOL/L (ref 21–32)
CREAT SERPL-MCNC: 1.71 MG/DL (ref 0.6–1.3)
GFR SERPL CREATININE-BSD FRML MDRD: 38 ML/MIN/1.73SQ M
GLUCOSE SERPL-MCNC: 94 MG/DL (ref 65–140)
POTASSIUM SERPL-SCNC: 4.3 MMOL/L (ref 3.5–5.3)
SODIUM SERPL-SCNC: 139 MMOL/L (ref 136–145)

## 2021-10-30 PROCEDURE — 99238 HOSP IP/OBS DSCHRG MGMT 30/<: CPT | Performed by: INTERNAL MEDICINE

## 2021-10-30 PROCEDURE — 80048 BASIC METABOLIC PNL TOTAL CA: CPT | Performed by: INTERNAL MEDICINE

## 2021-10-30 RX ORDER — CHLORPROMAZINE HYDROCHLORIDE 50 MG/1
25 TABLET, FILM COATED ORAL EVERY 6 HOURS PRN
Qty: 10 TABLET | Refills: 0 | Status: SHIPPED | OUTPATIENT
Start: 2021-10-30 | End: 2022-01-17

## 2021-10-30 RX ORDER — FAMOTIDINE 20 MG/1
20 TABLET, FILM COATED ORAL 2 TIMES DAILY
Qty: 60 TABLET | Refills: 1 | Status: SHIPPED | OUTPATIENT
Start: 2021-10-30 | End: 2022-01-17

## 2021-10-30 RX ADMIN — Medication 2000 UNITS: at 09:18

## 2021-10-30 RX ADMIN — FLUTICASONE FUROATE AND VILANTEROL TRIFENATATE 1 PUFF: 200; 25 POWDER RESPIRATORY (INHALATION) at 09:19

## 2021-10-30 RX ADMIN — SUCRALFATE 1 G: 1 TABLET ORAL at 09:18

## 2021-10-30 RX ADMIN — HEPARIN SODIUM 5000 UNITS: 5000 INJECTION INTRAVENOUS; SUBCUTANEOUS at 05:42

## 2021-11-02 ENCOUNTER — TELEPHONE (OUTPATIENT)
Dept: GASTROENTEROLOGY | Facility: CLINIC | Age: 76
End: 2021-11-02

## 2021-11-02 ENCOUNTER — TRANSITIONAL CARE MANAGEMENT (OUTPATIENT)
Dept: FAMILY MEDICINE CLINIC | Facility: MEDICAL CENTER | Age: 76
End: 2021-11-02

## 2021-11-02 ENCOUNTER — TELEPHONE (OUTPATIENT)
Dept: FAMILY MEDICINE CLINIC | Facility: MEDICAL CENTER | Age: 76
End: 2021-11-02

## 2021-11-05 ENCOUNTER — OFFICE VISIT (OUTPATIENT)
Dept: FAMILY MEDICINE CLINIC | Facility: MEDICAL CENTER | Age: 76
End: 2021-11-05
Payer: MEDICARE

## 2021-11-05 VITALS
OXYGEN SATURATION: 96 % | TEMPERATURE: 97.6 F | BODY MASS INDEX: 33.04 KG/M2 | HEIGHT: 68 IN | SYSTOLIC BLOOD PRESSURE: 132 MMHG | WEIGHT: 218 LBS | DIASTOLIC BLOOD PRESSURE: 78 MMHG | HEART RATE: 84 BPM

## 2021-11-05 DIAGNOSIS — R03.0 ELEVATED BP WITHOUT DIAGNOSIS OF HYPERTENSION: ICD-10-CM

## 2021-11-05 DIAGNOSIS — J45.20 MILD INTERMITTENT ASTHMA WITHOUT COMPLICATION: ICD-10-CM

## 2021-11-05 DIAGNOSIS — Z13.89 SCREENING FOR BLOOD OR PROTEIN IN URINE: ICD-10-CM

## 2021-11-05 DIAGNOSIS — Z11.59 NEED FOR HEPATITIS C SCREENING TEST: ICD-10-CM

## 2021-11-05 DIAGNOSIS — K29.70 GASTRITIS WITHOUT BLEEDING, UNSPECIFIED CHRONICITY, UNSPECIFIED GASTRITIS TYPE: Primary | ICD-10-CM

## 2021-11-05 DIAGNOSIS — E73.9 LACTOSE INTOLERANCE: ICD-10-CM

## 2021-11-05 DIAGNOSIS — R06.6 HICCUPS: ICD-10-CM

## 2021-11-05 DIAGNOSIS — R10.13 DYSPEPSIA: ICD-10-CM

## 2021-11-05 DIAGNOSIS — E66.09 CLASS 1 OBESITY DUE TO EXCESS CALORIES WITH SERIOUS COMORBIDITY IN ADULT, UNSPECIFIED BMI: ICD-10-CM

## 2021-11-05 DIAGNOSIS — Z76.89 ENCOUNTER FOR SUPPORT AND COORDINATION OF TRANSITION OF CARE: ICD-10-CM

## 2021-11-05 DIAGNOSIS — Z13.29 THYROID DISORDER SCREEN: ICD-10-CM

## 2021-11-05 DIAGNOSIS — Z13.220 SCREENING, LIPID: ICD-10-CM

## 2021-11-05 PROBLEM — Z23 ENCOUNTER FOR IMMUNIZATION: Status: ACTIVE | Noted: 2021-11-05

## 2021-11-05 PROBLEM — Z23 NEED FOR TDAP VACCINATION: Status: ACTIVE | Noted: 2021-11-05

## 2021-11-05 PROBLEM — Z23 NEED FOR INFLUENZA VACCINATION: Status: RESOLVED | Noted: 2021-11-05 | Resolved: 2021-11-05

## 2021-11-05 PROCEDURE — 99495 TRANSJ CARE MGMT MOD F2F 14D: CPT | Performed by: NURSE PRACTITIONER

## 2021-11-05 PROCEDURE — 1111F DSCHRG MED/CURRENT MED MERGE: CPT | Performed by: NURSE PRACTITIONER

## 2021-11-11 ENCOUNTER — APPOINTMENT (OUTPATIENT)
Dept: LAB | Facility: MEDICAL CENTER | Age: 76
End: 2021-11-11
Payer: MEDICARE

## 2021-11-11 DIAGNOSIS — Z13.29 THYROID DISORDER SCREEN: ICD-10-CM

## 2021-11-11 DIAGNOSIS — R03.0 ELEVATED BP WITHOUT DIAGNOSIS OF HYPERTENSION: ICD-10-CM

## 2021-11-11 DIAGNOSIS — Z13.220 SCREENING, LIPID: ICD-10-CM

## 2021-11-11 DIAGNOSIS — Z11.59 NEED FOR HEPATITIS C SCREENING TEST: ICD-10-CM

## 2021-11-11 LAB
ALBUMIN SERPL BCP-MCNC: 3.5 G/DL (ref 3.5–5)
ALP SERPL-CCNC: 64 U/L (ref 46–116)
ALT SERPL W P-5'-P-CCNC: 31 U/L (ref 12–78)
ANION GAP SERPL CALCULATED.3IONS-SCNC: 6 MMOL/L (ref 4–13)
AST SERPL W P-5'-P-CCNC: 19 U/L (ref 5–45)
BASOPHILS # BLD AUTO: 0.05 THOUSANDS/ΜL (ref 0–0.1)
BASOPHILS NFR BLD AUTO: 1 % (ref 0–1)
BILIRUB SERPL-MCNC: 0.77 MG/DL (ref 0.2–1)
BILIRUB UR QL STRIP: NEGATIVE
BUN SERPL-MCNC: 28 MG/DL (ref 5–25)
CALCIUM SERPL-MCNC: 9.4 MG/DL (ref 8.3–10.1)
CHLORIDE SERPL-SCNC: 109 MMOL/L (ref 100–108)
CHOLEST SERPL-MCNC: 233 MG/DL (ref 50–200)
CLARITY UR: CLEAR
CO2 SERPL-SCNC: 23 MMOL/L (ref 21–32)
COLOR UR: YELLOW
CREAT SERPL-MCNC: 1.75 MG/DL (ref 0.6–1.3)
EOSINOPHIL # BLD AUTO: 0.14 THOUSAND/ΜL (ref 0–0.61)
EOSINOPHIL NFR BLD AUTO: 2 % (ref 0–6)
ERYTHROCYTE [DISTWIDTH] IN BLOOD BY AUTOMATED COUNT: 13.7 % (ref 11.6–15.1)
GFR SERPL CREATININE-BSD FRML MDRD: 37 ML/MIN/1.73SQ M
GLUCOSE P FAST SERPL-MCNC: 85 MG/DL (ref 65–99)
GLUCOSE UR STRIP-MCNC: NEGATIVE MG/DL
HCT VFR BLD AUTO: 49.4 % (ref 36.5–49.3)
HCV AB SER QL: NORMAL
HDLC SERPL-MCNC: 89 MG/DL
HGB BLD-MCNC: 16.1 G/DL (ref 12–17)
HGB UR QL STRIP.AUTO: NEGATIVE
IMM GRANULOCYTES # BLD AUTO: 0.03 THOUSAND/UL (ref 0–0.2)
IMM GRANULOCYTES NFR BLD AUTO: 1 % (ref 0–2)
KETONES UR STRIP-MCNC: NEGATIVE MG/DL
LDLC SERPL CALC-MCNC: 131 MG/DL (ref 0–100)
LEUKOCYTE ESTERASE UR QL STRIP: NEGATIVE
LYMPHOCYTES # BLD AUTO: 1.17 THOUSANDS/ΜL (ref 0.6–4.47)
LYMPHOCYTES NFR BLD AUTO: 19 % (ref 14–44)
MCH RBC QN AUTO: 30.6 PG (ref 26.8–34.3)
MCHC RBC AUTO-ENTMCNC: 32.6 G/DL (ref 31.4–37.4)
MCV RBC AUTO: 94 FL (ref 82–98)
MONOCYTES # BLD AUTO: 0.62 THOUSAND/ΜL (ref 0.17–1.22)
MONOCYTES NFR BLD AUTO: 10 % (ref 4–12)
NEUTROPHILS # BLD AUTO: 4.16 THOUSANDS/ΜL (ref 1.85–7.62)
NEUTS SEG NFR BLD AUTO: 67 % (ref 43–75)
NITRITE UR QL STRIP: NEGATIVE
NONHDLC SERPL-MCNC: 144 MG/DL
NRBC BLD AUTO-RTO: 0 /100 WBCS
PH UR STRIP.AUTO: 6.5 [PH]
PLATELET # BLD AUTO: 277 THOUSANDS/UL (ref 149–390)
PMV BLD AUTO: 10.4 FL (ref 8.9–12.7)
POTASSIUM SERPL-SCNC: 4.1 MMOL/L (ref 3.5–5.3)
PROT SERPL-MCNC: 7 G/DL (ref 6.4–8.2)
PROT UR STRIP-MCNC: NEGATIVE MG/DL
RBC # BLD AUTO: 5.26 MILLION/UL (ref 3.88–5.62)
SODIUM SERPL-SCNC: 138 MMOL/L (ref 136–145)
SP GR UR STRIP.AUTO: 1.02 (ref 1–1.03)
TRIGL SERPL-MCNC: 66 MG/DL
TSH SERPL DL<=0.05 MIU/L-ACNC: 0.65 UIU/ML (ref 0.36–3.74)
UROBILINOGEN UR QL STRIP.AUTO: 0.2 E.U./DL
WBC # BLD AUTO: 6.17 THOUSAND/UL (ref 4.31–10.16)

## 2021-11-11 PROCEDURE — 85025 COMPLETE CBC W/AUTO DIFF WBC: CPT

## 2021-11-11 PROCEDURE — 81003 URINALYSIS AUTO W/O SCOPE: CPT | Performed by: NURSE PRACTITIONER

## 2021-11-11 PROCEDURE — 36415 COLL VENOUS BLD VENIPUNCTURE: CPT

## 2021-11-11 PROCEDURE — 80061 LIPID PANEL: CPT

## 2021-11-11 PROCEDURE — 84443 ASSAY THYROID STIM HORMONE: CPT

## 2021-11-11 PROCEDURE — 86803 HEPATITIS C AB TEST: CPT

## 2021-11-11 PROCEDURE — 80053 COMPREHEN METABOLIC PANEL: CPT

## 2021-11-12 ENCOUNTER — APPOINTMENT (OUTPATIENT)
Dept: LAB | Facility: MEDICAL CENTER | Age: 76
End: 2021-11-12
Payer: MEDICARE

## 2021-11-12 DIAGNOSIS — K29.00 ACUTE SUPERFICIAL GASTRITIS WITHOUT HEMORRHAGE: ICD-10-CM

## 2021-11-12 PROCEDURE — 87338 HPYLORI STOOL AG IA: CPT

## 2021-11-13 LAB — H PYLORI AG STL QL IA: NEGATIVE

## 2021-11-19 ENCOUNTER — OFFICE VISIT (OUTPATIENT)
Dept: FAMILY MEDICINE CLINIC | Facility: MEDICAL CENTER | Age: 76
End: 2021-11-19
Payer: MEDICARE

## 2021-11-19 VITALS
TEMPERATURE: 98.7 F | HEIGHT: 68 IN | RESPIRATION RATE: 16 BRPM | BODY MASS INDEX: 33.34 KG/M2 | SYSTOLIC BLOOD PRESSURE: 134 MMHG | HEART RATE: 76 BPM | WEIGHT: 220 LBS | DIASTOLIC BLOOD PRESSURE: 84 MMHG

## 2021-11-19 DIAGNOSIS — E66.1 CLASS 1 DRUG-INDUCED OBESITY WITHOUT SERIOUS COMORBIDITY WITH BODY MASS INDEX (BMI) OF 33.0 TO 33.9 IN ADULT: ICD-10-CM

## 2021-11-19 DIAGNOSIS — K29.00 OTHER ACUTE GASTRITIS WITHOUT HEMORRHAGE: ICD-10-CM

## 2021-11-19 DIAGNOSIS — R06.6 HICCUPS: ICD-10-CM

## 2021-11-19 DIAGNOSIS — Z00.00 MEDICARE ANNUAL WELLNESS VISIT, SUBSEQUENT: ICD-10-CM

## 2021-11-19 DIAGNOSIS — R03.0 ELEVATED BP WITHOUT DIAGNOSIS OF HYPERTENSION: Primary | ICD-10-CM

## 2021-11-19 PROCEDURE — G0439 PPPS, SUBSEQ VISIT: HCPCS | Performed by: NURSE PRACTITIONER

## 2021-11-19 PROCEDURE — 99214 OFFICE O/P EST MOD 30 MIN: CPT | Performed by: NURSE PRACTITIONER

## 2021-11-22 ENCOUNTER — TELEPHONE (OUTPATIENT)
Dept: GASTROENTEROLOGY | Facility: CLINIC | Age: 76
End: 2021-11-22

## 2021-11-29 ENCOUNTER — OFFICE VISIT (OUTPATIENT)
Dept: GASTROENTEROLOGY | Facility: AMBULARY SURGERY CENTER | Age: 76
End: 2021-11-29
Payer: MEDICARE

## 2021-11-29 VITALS
SYSTOLIC BLOOD PRESSURE: 132 MMHG | DIASTOLIC BLOOD PRESSURE: 68 MMHG | WEIGHT: 216.8 LBS | HEIGHT: 68 IN | BODY MASS INDEX: 32.86 KG/M2

## 2021-11-29 DIAGNOSIS — K22.4 ESOPHAGEAL SPASM: Primary | ICD-10-CM

## 2021-11-29 DIAGNOSIS — K29.30 CHRONIC SUPERFICIAL GASTRITIS WITHOUT BLEEDING: ICD-10-CM

## 2021-11-29 PROCEDURE — 99214 OFFICE O/P EST MOD 30 MIN: CPT | Performed by: PHYSICIAN ASSISTANT

## 2021-11-29 RX ORDER — DILTIAZEM HYDROCHLORIDE 60 MG/1
60 CAPSULE, EXTENDED RELEASE ORAL 2 TIMES DAILY
Qty: 60 CAPSULE | Refills: 2 | Status: SHIPPED | OUTPATIENT
Start: 2021-11-29 | End: 2022-02-25

## 2021-12-21 ENCOUNTER — TELEPHONE (OUTPATIENT)
Dept: GASTROENTEROLOGY | Facility: CLINIC | Age: 76
End: 2021-12-21

## 2021-12-23 ENCOUNTER — APPOINTMENT (EMERGENCY)
Dept: CT IMAGING | Facility: HOSPITAL | Age: 76
End: 2021-12-23
Payer: MEDICARE

## 2021-12-23 ENCOUNTER — APPOINTMENT (EMERGENCY)
Dept: RADIOLOGY | Facility: HOSPITAL | Age: 76
End: 2021-12-23
Payer: MEDICARE

## 2021-12-23 ENCOUNTER — TELEPHONE (OUTPATIENT)
Dept: GASTROENTEROLOGY | Facility: CLINIC | Age: 76
End: 2021-12-23

## 2021-12-23 ENCOUNTER — DOCUMENTATION (OUTPATIENT)
Dept: FAMILY MEDICINE CLINIC | Facility: MEDICAL CENTER | Age: 76
End: 2021-12-23

## 2021-12-23 ENCOUNTER — HOSPITAL ENCOUNTER (EMERGENCY)
Facility: HOSPITAL | Age: 76
Discharge: HOME | End: 2021-12-23
Attending: EMERGENCY MEDICINE
Payer: MEDICARE

## 2021-12-23 VITALS
BODY MASS INDEX: 31.25 KG/M2 | OXYGEN SATURATION: 97 % | RESPIRATION RATE: 18 BRPM | SYSTOLIC BLOOD PRESSURE: 144 MMHG | DIASTOLIC BLOOD PRESSURE: 69 MMHG | HEIGHT: 69 IN | HEART RATE: 60 BPM | TEMPERATURE: 98.4 F | WEIGHT: 211 LBS

## 2021-12-23 DIAGNOSIS — R06.6 HICCUPS: Primary | ICD-10-CM

## 2021-12-23 LAB
2HR DELTA HS TROPONIN: 1 NG/L
ALBUMIN SERPL BCP-MCNC: 4 G/DL (ref 3.5–5)
ALP SERPL-CCNC: 79 U/L (ref 46–116)
ALT SERPL W P-5'-P-CCNC: 29 U/L (ref 12–78)
ANION GAP SERPL CALCULATED.3IONS-SCNC: 6 MMOL/L (ref 4–13)
AST SERPL W P-5'-P-CCNC: 26 U/L (ref 5–45)
BASOPHILS # BLD AUTO: 0.07 THOUSANDS/ΜL (ref 0–0.1)
BASOPHILS NFR BLD AUTO: 1 % (ref 0–1)
BILIRUB SERPL-MCNC: 0.56 MG/DL (ref 0.2–1)
BUN SERPL-MCNC: 30 MG/DL (ref 5–25)
CALCIUM SERPL-MCNC: 9 MG/DL (ref 8.3–10.1)
CARDIAC TROPONIN I PNL SERPL HS: 16 NG/L
CARDIAC TROPONIN I PNL SERPL HS: 17 NG/L
CHLORIDE SERPL-SCNC: 102 MMOL/L (ref 100–108)
CO2 SERPL-SCNC: 29 MMOL/L (ref 21–32)
CREAT SERPL-MCNC: 1.79 MG/DL (ref 0.6–1.3)
EOSINOPHIL # BLD AUTO: 0.09 THOUSAND/ΜL (ref 0–0.61)
EOSINOPHIL NFR BLD AUTO: 1 % (ref 0–6)
ERYTHROCYTE [DISTWIDTH] IN BLOOD BY AUTOMATED COUNT: 14.2 % (ref 11.6–15.1)
GFR SERPL CREATININE-BSD FRML MDRD: 35 ML/MIN/1.73SQ M
GLUCOSE SERPL-MCNC: 107 MG/DL (ref 65–140)
HCT VFR BLD AUTO: 49.7 % (ref 36.5–49.3)
HGB BLD-MCNC: 16.8 G/DL (ref 12–17)
IMM GRANULOCYTES # BLD AUTO: 0.04 THOUSAND/UL (ref 0–0.2)
IMM GRANULOCYTES NFR BLD AUTO: 1 % (ref 0–2)
LYMPHOCYTES # BLD AUTO: 1.5 THOUSANDS/ΜL (ref 0.6–4.47)
LYMPHOCYTES NFR BLD AUTO: 22 % (ref 14–44)
MCH RBC QN AUTO: 31.2 PG (ref 26.8–34.3)
MCHC RBC AUTO-ENTMCNC: 33.8 G/DL (ref 31.4–37.4)
MCV RBC AUTO: 92 FL (ref 82–98)
MONOCYTES # BLD AUTO: 0.49 THOUSAND/ΜL (ref 0.17–1.22)
MONOCYTES NFR BLD AUTO: 7 % (ref 4–12)
NEUTROPHILS # BLD AUTO: 4.57 THOUSANDS/ΜL (ref 1.85–7.62)
NEUTS SEG NFR BLD AUTO: 68 % (ref 43–75)
NRBC BLD AUTO-RTO: 0 /100 WBCS
PLATELET # BLD AUTO: 239 THOUSANDS/UL (ref 149–390)
PMV BLD AUTO: 9.5 FL (ref 8.9–12.7)
POTASSIUM SERPL-SCNC: 4.9 MMOL/L (ref 3.5–5.3)
PROT SERPL-MCNC: 7.6 G/DL (ref 6.4–8.2)
RBC # BLD AUTO: 5.39 MILLION/UL (ref 3.88–5.62)
SODIUM SERPL-SCNC: 137 MMOL/L (ref 136–145)
WBC # BLD AUTO: 6.76 THOUSAND/UL (ref 4.31–10.16)

## 2021-12-23 PROCEDURE — 84484 ASSAY OF TROPONIN QUANT: CPT | Performed by: EMERGENCY MEDICINE

## 2021-12-23 PROCEDURE — 85025 COMPLETE CBC W/AUTO DIFF WBC: CPT | Performed by: EMERGENCY MEDICINE

## 2021-12-23 PROCEDURE — 93005 ELECTROCARDIOGRAM TRACING: CPT

## 2021-12-23 PROCEDURE — 70450 CT HEAD/BRAIN W/O DYE: CPT

## 2021-12-23 PROCEDURE — 99284 EMERGENCY DEPT VISIT MOD MDM: CPT

## 2021-12-23 PROCEDURE — 99284 EMERGENCY DEPT VISIT MOD MDM: CPT | Performed by: EMERGENCY MEDICINE

## 2021-12-23 PROCEDURE — 80053 COMPREHEN METABOLIC PANEL: CPT | Performed by: EMERGENCY MEDICINE

## 2021-12-23 PROCEDURE — 36415 COLL VENOUS BLD VENIPUNCTURE: CPT | Performed by: EMERGENCY MEDICINE

## 2021-12-23 PROCEDURE — 71045 X-RAY EXAM CHEST 1 VIEW: CPT

## 2021-12-23 PROCEDURE — G1004 CDSM NDSC: HCPCS

## 2021-12-23 RX ORDER — LORAZEPAM 1 MG/1
1 TABLET ORAL ONCE
Status: COMPLETED | OUTPATIENT
Start: 2021-12-23 | End: 2021-12-23

## 2021-12-23 RX ADMIN — LORAZEPAM 1 MG: 1 TABLET ORAL at 17:59

## 2021-12-24 LAB
ATRIAL RATE: 150 BPM
ATRIAL RATE: 73 BPM
ATRIAL RATE: 86 BPM
P AXIS: 66 DEGREES
P AXIS: 78 DEGREES
P AXIS: 80 DEGREES
PR INTERVAL: 144 MS
PR INTERVAL: 158 MS
QRS AXIS: -1 DEGREES
QRS AXIS: -16 DEGREES
QRS AXIS: 15 DEGREES
QRSD INTERVAL: 88 MS
QRSD INTERVAL: 88 MS
QRSD INTERVAL: 92 MS
QT INTERVAL: 352 MS
QT INTERVAL: 384 MS
QT INTERVAL: 418 MS
QTC INTERVAL: 406 MS
QTC INTERVAL: 409 MS
QTC INTERVAL: 423 MS
T WAVE AXIS: 24 DEGREES
T WAVE AXIS: 36 DEGREES
T WAVE AXIS: 61 DEGREES
VENTRICULAR RATE: 57 BPM
VENTRICULAR RATE: 73 BPM
VENTRICULAR RATE: 81 BPM

## 2021-12-24 PROCEDURE — 93010 ELECTROCARDIOGRAM REPORT: CPT | Performed by: INTERNAL MEDICINE

## 2021-12-28 ENCOUNTER — OFFICE VISIT (OUTPATIENT)
Dept: FAMILY MEDICINE CLINIC | Facility: MEDICAL CENTER | Age: 76
End: 2021-12-28
Payer: MEDICARE

## 2021-12-28 VITALS
DIASTOLIC BLOOD PRESSURE: 80 MMHG | HEART RATE: 79 BPM | TEMPERATURE: 97.6 F | BODY MASS INDEX: 33.12 KG/M2 | OXYGEN SATURATION: 97 % | WEIGHT: 223.6 LBS | SYSTOLIC BLOOD PRESSURE: 130 MMHG | HEIGHT: 69 IN

## 2021-12-28 DIAGNOSIS — R10.13 DYSPEPSIA: ICD-10-CM

## 2021-12-28 DIAGNOSIS — R03.0 ELEVATED BP WITHOUT DIAGNOSIS OF HYPERTENSION: ICD-10-CM

## 2021-12-28 DIAGNOSIS — R06.6 HICCUPS: ICD-10-CM

## 2021-12-28 DIAGNOSIS — R93.0 ABNORMAL CT SCAN OF HEAD: Primary | ICD-10-CM

## 2021-12-28 DIAGNOSIS — E66.1 CLASS 1 DRUG-INDUCED OBESITY WITHOUT SERIOUS COMORBIDITY WITH BODY MASS INDEX (BMI) OF 33.0 TO 33.9 IN ADULT: ICD-10-CM

## 2021-12-28 DIAGNOSIS — K29.00 OTHER ACUTE GASTRITIS WITHOUT HEMORRHAGE: ICD-10-CM

## 2021-12-28 PROCEDURE — 99215 OFFICE O/P EST HI 40 MIN: CPT | Performed by: NURSE PRACTITIONER

## 2021-12-29 ENCOUNTER — TELEPHONE (OUTPATIENT)
Dept: GASTROENTEROLOGY | Facility: CLINIC | Age: 76
End: 2021-12-29

## 2022-01-17 ENCOUNTER — OFFICE VISIT (OUTPATIENT)
Dept: CARDIOLOGY CLINIC | Facility: MEDICAL CENTER | Age: 77
End: 2022-01-17
Payer: MEDICARE

## 2022-01-17 VITALS
HEART RATE: 89 BPM | SYSTOLIC BLOOD PRESSURE: 142 MMHG | WEIGHT: 225 LBS | BODY MASS INDEX: 33.33 KG/M2 | DIASTOLIC BLOOD PRESSURE: 82 MMHG | OXYGEN SATURATION: 98 % | HEIGHT: 69 IN

## 2022-01-17 DIAGNOSIS — N18.32 STAGE 3B CHRONIC KIDNEY DISEASE (HCC): ICD-10-CM

## 2022-01-17 DIAGNOSIS — R06.6 HICCUPS: ICD-10-CM

## 2022-01-17 DIAGNOSIS — R03.0 ELEVATED BP WITHOUT DIAGNOSIS OF HYPERTENSION: ICD-10-CM

## 2022-01-17 DIAGNOSIS — K29.00 OTHER ACUTE GASTRITIS WITHOUT HEMORRHAGE: ICD-10-CM

## 2022-01-17 DIAGNOSIS — R10.13 DYSPEPSIA: ICD-10-CM

## 2022-01-17 DIAGNOSIS — R07.2 PRECORDIAL CHEST PAIN: Primary | ICD-10-CM

## 2022-01-17 PROCEDURE — 99204 OFFICE O/P NEW MOD 45 MIN: CPT | Performed by: INTERNAL MEDICINE

## 2022-01-17 PROCEDURE — 93000 ELECTROCARDIOGRAM COMPLETE: CPT | Performed by: INTERNAL MEDICINE

## 2022-01-17 NOTE — ASSESSMENT & PLAN NOTE
Continued symptoms of dyspepsia for which he follows up with Gastroenterology  Currently not taking the Pepcid or Carafate, requested removal of these medicines from the chart

## 2022-01-17 NOTE — ASSESSMENT & PLAN NOTE
Lab Results   Component Value Date    EGFR 35 12/23/2021    EGFR 37 11/11/2021    EGFR 38 10/30/2021    CREATININE 1 79 (H) 12/23/2021    CREATININE 1 75 (H) 11/11/2021    CREATININE 1 71 (H) 10/30/2021   Close primary care follow-up  Blood pressure control is adequate

## 2022-01-17 NOTE — PROGRESS NOTES
[facility] KEVEN [place] [address] [place] [id number]  Phone#  [phone]  Fax#  [phone]  [facility]             NAME: Yesi Thompson  AGE: 67 y o  SEX: male   : 1945   MRN: [id number]    DATE: 2022  TIME: 11:13 AM    Assessment and plan:    1  Precordial chest pain  Assessment & Plan:  Noncardiac chest pain  Most likely gastrointestinal in etiology  No exertional component to his chest pain  ASCVD risk is over 25% however his previous coronary angiogram about 5 years ago showed no obstructive CAD  This was done at the [facility]   I do not have access to those images or that record  I did look in Care everywhere  I personally reviewed his CT of the chest done few years ago and this showed no coronary calcification and that is reassuring  We have requested exercise treadmill stress test for further risk stratification, but I am very hopeful this will be a low risk study  He will continue his current risk factor modification and regular exercise  Orders:  -     POCT ECG  -     Stress test only, exercise; Future; Expected date: 2022    2  Stage 3b chronic kidney disease [facility])  Assessment & Plan:  Lab Results   Component Value Date    EGFR 35 2021    EGFR 37 2021    EGFR 38 10/30/2021    CREATININE 1 79 (H) 2021    CREATININE 1 75 (H) 2021    CREATININE 1 71 (H) 10/30/2021   Close primary care follow-up  Blood pressure control is adequate  3  Dyspepsia  Assessment & Plan:  Continued symptoms of dyspepsia for which he follows up with Gastroenterology  Currently not taking the Pepcid or Carafate, requested removal of post medicines from the chart  4  Elevated BP without diagnosis of hypertension  Assessment & Plan:  BP Readings from Last 3 Encounters:   22 142/82   21 130/80   21 144/69       Home blood pressure monitoring    Continue low-dose diltiazem, which will help with blood pressure as well  Continue current medications  Lifestyle modification including increased physical activity, low-salt diet rich in fruits and vegetables, avoidance of alcohol intake and maintaining healthy weight  Chief Complaint   Patient presents with   [address] [facility] [address] Patient Visit       HPI:    David Lane is a 67y o -year-old male who presents to the cardiology clinic for evaluation  He has been referred here for evaluation of chest pain, epigastric discomfort and excessive belching  Symptoms began about 4 months ago that led to an ER visit  Since then he has had endoscopy performed and has seen Gastroenterology  He was placed on diltiazem by Gastroenterology for esophageal spasms on 11/29/2021  Called their clinic back to ask how long he needs to be on diltiazem and he was told to contact cardiology, if it is safe for him to continue this medicine  Reports he had esophageal spasms (epigastric chest pains that were intermittent) with dyspepsia and belching and went to the ER  Had endoscopy done and was OK  He was placed on diltiazem for the hiccoughs and spasms and still has some burping  His hiccups are some better  He would like to continue his diltiazem  He has history of elevated blood pressure without a formal diagnosis of hypertension which has been managed with risk factor modification  Usual blood pressure is about 867-297 systolic  No significant high readings  Current symptoms:  Reports occasional epigastric discomfort  Reports belching  No exertional chest pain or shortness of breath  Reports a fairly active lifestyle  He retired 4 years ago after working for 17 years as a   He does not smoke, quit smoking over 10 years ago  He has no family history of premature CAD  In fact there is a history of longevity in his family members  His daughter works at [employer] for vascular surgery    Total cholesterol was last elevated but the HDL was 89  Overall he feels that his symptoms are related more to gastrointestinal etiology  He is awaiting a brain MRI for further workup  Echo also was requested by primary provider  The 10-year ASCVD risk score (Carlos Johnson et al , 2013) is: 29%    Values used to calculate the score:      Age: 67 years      Sex: Male      Is Non- : No      Diabetic: No      Tobacco smoker: No      Systolic Blood Pressure: 911 mmHg      Is BP treated: No      HDL Cholesterol: 89 mg/dL      Total Cholesterol: 233 mg/dL    Cardiology Problem list:  Chest pain:  GERD  Belching and hiccups:  Sees gastroenterology  2016:  Coronary angiogram, no stenosis  Done at [facility]  2018:  CT chest:  No coronary calcium      Past history, family history, social history, current medications, vital signs, recent lab and imaging studies and  prior cardiology studies reviewed independently on this visit  BP Readings from Last 4 Encounters:   01/17/22 142/82   12/28/21 130/80   12/23/21 144/69   11/29/21 132/68      Wt Readings from Last 3 Encounters:   01/17/22 102 kg (225 lb)   12/28/21 101 kg (223 lb 9 6 oz)   12/23/21 95 7 kg (211 lb)       Lab Results   Component Value Date    LDLCALC 131 (H) 11/11/2021     Lab Results   Component Value Date    CREATININE 1 79 (H) 12/23/2021          ALLERGIES:  Allergies   Allergen Reactions    Other      Cats, Ragweed    Pollen Extract          Current Outpatient Medications:     acetaminophen (TYLENOL) 500 mg tablet, Take 1 500mg tablet in the morning prior to surgery, then 1 tablet every 6 hours for 5-7 days  , Disp: 30 tablet, Rfl: 0    BREO ELLIPTA 200-25 MCG/INH inhaler,  , Disp: , Rfl:     Cholecalciferol 5000 units capsule, Take 2,000 Units by mouth every other day  , Disp: , Rfl:     diltiazem (CARDIZEM SR) 60 mg 12 hr capsule, Take 1 capsule (60 mg total) by mouth 2 (two) times a day, Disp: 60 capsule, Rfl: 2    latanoprost (XALATAN) 0 005 % ophthalmic solution, place 1 drop into both eyes at bedtime, Disp: , Rfl:     olopatadine (PATANOL) 0 1 % ophthalmic solution, 1 drop  , Disp: , Rfl:     Ventolin  (90 Base) MCG/ACT inhaler, inhale 2 puffs by mouth and INTO THE LUNGS every 4 hours if needed for 30 DAYS, Disp: , Rfl:       Review of Systems   Constitutional: Negative for fever and unexpected weight change  HENT: Negative for congestion and trouble swallowing  Eyes: Negative for visual disturbance  Respiratory: Negative for chest tightness, shortness of breath and wheezing  Cardiovascular: Positive for chest pain  Negative for palpitations and leg swelling  Gastrointestinal: Positive for abdominal pain  Negative for blood in stool  Reports belching, burping, hiccups  Endocrine: Negative for polyuria  Genitourinary: Negative for hematuria  Musculoskeletal: Negative for arthralgias and myalgias  Skin: Negative for rash  Neurological: Negative for dizziness, tremors and weakness  Hematological: Does not bruise/bleed easily  Psychiatric/Behavioral: Negative for sleep disturbance  The patient is not nervous/anxious          Past Medical History:   Diagnosis Date    Allergic     Asthma     Azotemia     BPH with obstruction/lower urinary tract symptoms     Chronic kidney disease     stage 3    Headache(784 0)     Hydronephrosis     Incomplete bladder emptying     Pneumonia     Urinary retention        Past Surgical History:   Procedure Laterality Date    APPENDECTOMY      COLONOSCOPY      CYSTOSCOPY  2014    HERNIA REPAIR      KNEE SURGERY  2013    meniscus tear    [place] INCISE FINGER TENDON SHEATH Right 1/19/2021    Procedure: Right long finger trigger finger release;  Surgeon: Yo Calvo MD;  Location: [facility];  Service: Orthopedics    ROOT CANAL      TRANSURETHRAL RESECTION OF PROSTATE      US GUIDANCE  5/30/2018       Family History   Family history unknown: Yes   Problem Relation Age of Onset    Family history unknown: Yes    Dementia Father     Glaucoma Father     Completed Suicide  Cousin        Social History   reports that he quit smoking about 10 years ago  His smoking use included cigarettes  He has a 2 50 pack-year smoking history  He has never used smokeless tobacco  He reports current alcohol use of about 2 0 standard drinks of alcohol per week  He reports that he does not use drugs  Objective:     Vitals:    01/17/22 1030   BP: 142/82   Pulse: 89   SpO2: 98%     Wt Readings from Last 3 Encounters:   01/17/22 102 kg (225 lb)   12/28/21 101 kg (223 lb 9 6 oz)   12/23/21 95 7 kg (211 lb)     Pulse Readings from Last 3 Encounters:   01/17/22 89   12/28/21 79   12/23/21 60     BP Readings from Last 3 Encounters:   01/17/22 142/82   12/28/21 130/80   12/23/21 144/69         Physical Exam  Constitutional:       General: He is not in acute distress  HENT:      Head: Normocephalic  Eyes:      Conjunctiva/sclera: Conjunctivae normal    Neck:      Vascular: No carotid bruit  Cardiovascular:      Rate and Rhythm: Normal rate and regular rhythm  Heart sounds: S1 normal and S2 normal  No murmur heard  Pulmonary:      Effort: Pulmonary effort is normal       Breath sounds: Normal breath sounds  Abdominal:      General: Bowel sounds are normal  There is no distension  Tenderness: There is abdominal tenderness  Musculoskeletal:      Right lower leg: No edema  Left lower leg: No edema  Skin:     General: Skin is warm  Neurological:      General: No focal deficit present     Psychiatric:         Mood and Affect: Mood normal          Pertinent Laboratory/Diagnostic Studies:    Laboratory studies reviewed personally by Alicia Carrera MD    BMP:   Lab Results   Component Value Date    SODIUM 137 12/23/2021    K 4 9 12/23/2021     12/23/2021    CO2 29 12/23/2021    BUN 30 (H) 12/23/2021    CREATININE 1 79 (H) 12/23/2021    GLUC 107 12/23/2021    CALCIUM 9 0 12/23/2021     CBC:  Lab Results   Component Value Date    WBC 6 76 12/23/2021    RBC 5 39 12/23/2021    HGB 16 8 12/23/2021    HCT 49 7 (H) 12/23/2021    MCV 92 12/23/2021    MCH 31 2 12/23/2021    RDW 14 2 12/23/2021     12/23/2021     Coags:    Lipid Profile:   No results found for: CHOL  Lab Results   Component Value Date    HDL 89 11/11/2021     Lab Results   Component Value Date    LDLCALC 131 (H) 11/11/2021     Lab Results   Component Value Date    TRIG 66 11/11/2021      Lab Results   Component Value Date    SYB6VLAFFVZO 0 652 11/11/2021     Lab Results   Component Value Date    ALT 29 12/23/2021    AST 26 12/23/2021         Imaging Studies:   XR chest 1 view portable    Result Date: 12/24/2021  Narrative: CHEST INDICATION:   hiccups  COMPARISON:  01/31/2019 EXAM PERFORMED/VIEWS:  XR CHEST PORTABLE 1 image FINDINGS: Cardiomediastinal silhouette appears unremarkable  The lungs are clear  No pneumothorax or pleural effusion  Osseous structures appear within normal limits for patient age  Impression: No acute cardiopulmonary disease  Workstation performed: [id number]     CT head without contrast    Result Date: 12/23/2021  Narrative: CT BRAIN - WITHOUT CONTRAST INDICATION:   intractable hiccups  COMPARISON:  None  TECHNIQUE:  CT examination of the brain was performed  In addition to axial images, sagittal and coronal 2D reformatted images were created and submitted for interpretation  Radiation dose length product (DLP) for this visit:  948 mGy-cm   This examination, like all CT scans performed in the [facility], was performed utilizing techniques to minimize radiation dose exposure, including the use of iterative reconstruction and automated exposure control  IMAGE QUALITY:  Degraded by motion artifact FINDINGS: PARENCHYMA:  No intracranial mass, mass effect or midline shift  No CT signs of acute infarction  No gross acute hemorrhage   VENTRICLES AND EXTRA-AXIAL SPACES:  Normal for the patient's age  VISUALIZED ORBITS AND PARANASAL SINUSES:  No acute abnormality involving the orbits  Mild scattered sinus mucosal thickening is noted  No fluid levels are seen  CALVARIUM AND EXTRACRANIAL SOFT TISSUES:  Normal      Impression: No gross acute intracranial abnormality though evaluation is limited by motion artifact, particularly evaluation for extra-axial hemorrhage  Workstation performed: [id number]           Cardiac testing:   EKG reviewed personally: normal EKG, normal sinus rhythm  Orders Placed This Encounter   Procedures    Stress test only, exercise    POCT ECG           Steve Motta MD, [facility] [place]    Portions of the record may have been created with voice recognition software  Occasional wrong word or "sound a like" substitutions may have occurred due to the inherent limitations of voice recognition software  Read the chart carefully and recognize, using context, where substitutions have occurred  If you have any questions or concerns about the context, text or information contained within the body of this dictation, please contact myself, the provider, for further clarification

## 2022-01-17 NOTE — ASSESSMENT & PLAN NOTE
Noncardiac chest pain  Most likely gastrointestinal in etiology  No exertional component to his chest pain  ASCVD risk is over 25% however his previous coronary angiogram about 5 years ago showed no obstructive CAD  This was done at the [facility]   I do not have access to those images or that record  I did look in Care everywhere  I personally reviewed his CT of the chest done few years ago and this showed no coronary calcification and that is reassuring  We have requested exercise treadmill stress test for further risk stratification, to assess blood pressure control with exercise, and evaluate exercise capacity  He will continue his current risk factor modification and regular exercise

## 2022-01-17 NOTE — ASSESSMENT & PLAN NOTE
BP Readings from Last 3 Encounters:   01/17/22 142/82   12/28/21 130/80   12/23/21 144/69       Home blood pressure monitoring  Continue low-dose diltiazem, which will help with blood pressure as well  Continue current medications  Lifestyle modification including increased physical activity, low-salt diet rich in fruits and vegetables, avoidance of alcohol intake and maintaining healthy weight

## 2022-01-31 ENCOUNTER — CONSULT (OUTPATIENT)
Dept: GASTROENTEROLOGY | Facility: AMBULARY SURGERY CENTER | Age: 77
End: 2022-01-31
Payer: MEDICARE

## 2022-01-31 VITALS
HEIGHT: 69 IN | HEART RATE: 85 BPM | WEIGHT: 220.8 LBS | DIASTOLIC BLOOD PRESSURE: 76 MMHG | BODY MASS INDEX: 32.7 KG/M2 | SYSTOLIC BLOOD PRESSURE: 134 MMHG

## 2022-01-31 DIAGNOSIS — R10.13 EPIGASTRIC DISCOMFORT: ICD-10-CM

## 2022-01-31 DIAGNOSIS — R06.6 INTRACTABLE HICCUPS: ICD-10-CM

## 2022-01-31 PROCEDURE — 99214 OFFICE O/P EST MOD 30 MIN: CPT | Performed by: INTERNAL MEDICINE

## 2022-01-31 RX ORDER — FAMOTIDINE 40 MG/1
40 TABLET, FILM COATED ORAL DAILY
Qty: 30 TABLET | Refills: 3 | Status: SHIPPED | OUTPATIENT
Start: 2022-01-31 | End: 2022-05-26

## 2022-01-31 NOTE — PROGRESS NOTES
[facility]  Progress Note - Guillermo Miller 67 y o  male MRN: [id number]    Unit/Bed#:  Encounter: [id number]    Assessment/Plan:      1  Hiccups/esophageal spasms-currently on diltiazem 60 mg twice daily which he is also using for hypertension  Symptoms are well controlled   -can continue with diltiazem 60 mg twice daily of okay from a cardiac standpoint     -would recommend obtaining a barium swallow to assess for any other dysmotility  2  Dyspepsia-no evidence of H pylori on gastric biopsies, confirmed with the stool test    No evidence of intestinal metaplasia  No need for repeat EGD  Given ongoing symptoms of dyspepsia, would encourage taking Pepcid on a daily basis  Also discussed with patient regarding dietary modifications  Avoid eating late at night  Encourage weight loss  Avoid NSAIDs  3  Colon cancer screening-reportedly had a colonoscopy 7 years ago which was normal   Patient is not interested in having another colonoscopy at this time  No other alarm symptoms     -can consider ordering Cologuard testing in the future patient is interested  Would recommend obtaining the results of the prior colonoscopy however to assess for whether patient had any polyps  Subjective:   [de-identified] year old male with history of hiccups, presents for follow-up  He underwent EGD on October 29th by Dr Rodriguez presents for follow-up  Patient was placed on Thorazine for hiccups  His endoscopic exam was unremarkable  Gastric biopsies were equivocal for H pylori bacteria  There was no evidence of intestinal metaplasia but there was a focally active antral gastritis  He was ordered for stool antigen, which came back negative for H pylori  Patient reports that he has been taking diltiazem 60 mg twice daily and has not had any of the esophageal spasms a however he has not been taking Pepcid which was prescribed previously for symptoms of dyspepsia    He does report ongoing symptoms which include belching and bloating  He denies any hematemesis, coffee-ground emesis or melena  No unintentional weight loss, in fact has gained some weight  Patient reports that he underwent colonoscopy 7-8 years ago, reports that he underwent some sort of stool testing which was negative  I do not see the results of any Cologuard or fecal occult blood test   Patient reports that he is not interested in any colon cancer screening modalities at this time  Objective:     Vitals: Blood pressure 134/76, pulse 85, height 5' 9" (1 753 m), weight 100 kg (220 lb 12 8 oz)  ,Body mass index is 32 61 kg/m²  [unfilled]    Physical Exam:    GEN: wn/wd, NAD  HEENT: MMM, no cervical or supraclavicular LAD, anciteric  CV: RRR, no m/r/g  CHEST: CTA b/l, no w/r/r  ABD: +BS, soft, NT/ND, no hepatosplenomegaly  EXT: no c/c/e  SKIN: no rashes  NEURO: aaox3      Invasive Devices  Report    None                         Lab, Imaging and other studies:     No visits with results within 1 Day(s) from this visit     Latest known visit with results is:   Admission on 12/23/2021, Discharged on 12/23/2021   Component Date Value    WBC 12/23/2021 6 76     RBC 12/23/2021 5 39     Hemoglobin 12/23/2021 16 8     Hematocrit 12/23/2021 49 7*    MCV 12/23/2021 92     MCH 12/23/2021 31 2     MCHC 12/23/2021 33 8     RDW 12/23/2021 14 2     MPV 12/23/2021 9 5     Platelets 98/56/6442 239     nRBC 12/23/2021 0     Neutrophils Relative 12/23/2021 68     Immat GRANS % 12/23/2021 1     Lymphocytes Relative 12/23/2021 22     Monocytes Relative 12/23/2021 7     Eosinophils Relative 12/23/2021 1     Basophils Relative 12/23/2021 1     Neutrophils Absolute 12/23/2021 4 57     Immature Grans Absolute 12/23/2021 0 04     Lymphocytes Absolute 12/23/2021 1 50     Monocytes Absolute 12/23/2021 0 49     Eosinophils Absolute 12/23/2021 0 09     Basophils Absolute 12/23/2021 0 07     Sodium 12/23/2021 137     Potassium 12/23/2021 4 9     Chloride 12/23/2021 102     CO2 12/23/2021 29     ANION GAP 12/23/2021 6     BUN 12/23/2021 30*    Creatinine 12/23/2021 1 79*    Glucose 12/23/2021 107     Calcium 12/23/2021 9 0     AST 12/23/2021 26     ALT 12/23/2021 29     Alkaline Phosphatase 12/23/2021 79     Total Protein 12/23/2021 7 6     Albumin 12/23/2021 4 0     Total Bilirubin 12/23/2021 0 56     eGFR 12/23/2021 35     hs TnI 0hr 12/23/2021 16     hs TnI 2hr 12/23/2021 17     Delta 2hr hsTnI 12/23/2021 1     Ventricular Rate 12/23/2021 73     Atrial Rate 12/23/2021 73     IN Interval 12/23/2021 158     QRSD Interval 12/23/2021 92     QT Interval 12/23/2021 384     QTC Interval 12/23/2021 423     P Axis 12/23/2021 80     QRS Axis 12/23/2021 15     T Wave Axis 12/23/2021 61     Ventricular Rate 12/23/2021 57     Atrial Rate 12/23/2021 150     QRSD Interval 12/23/2021 88     QT Interval 12/23/2021 418     QTC Interval 12/23/2021 406     P Axis 12/23/2021 78     QRS Axis 12/23/2021 -1     T Wave Axis 12/23/2021 24     Ventricular Rate 12/23/2021 81     Atrial Rate 12/23/2021 86     IN Interval 12/23/2021 144     QRSD Interval 12/23/2021 88     QT Interval 12/23/2021 352     QTC Interval 12/23/2021 409     P Axis 12/23/2021 66     QRS Axis 12/23/2021 -16     T Wave Axis 12/23/2021 36          I have personally reviewed pertinent films in PACS    No current facility-administered medications for this visit

## 2022-01-31 NOTE — LETTER
January 31, 2022     Kayla Schroeder MD  [address] [place] [id number]    Patient: Marisabel Elder   YOB: 1945   Date of Visit: 1/31/2022       Dear Dr Marcell Eisenmenger:    Thank you for referring Ruel Hess to me for evaluation  Below are my notes for this consultation  If you have questions, please do not hesitate to call me  I look forward to following your patient along with you  Sincerely,        Jimmie Giang MD        CC: Eris Jara MD  1/31/2022  1:12 PM  Sign when Signing Visit  [facility]  Progress Note - Marisabel Elder 67 y o  male MRN: [id number]    Unit/Bed#:  Encounter: [id number]    Assessment/Plan:      1  Hiccups/esophageal spasms-currently on diltiazem 60 mg twice daily which he is also using for hypertension  Symptoms are well controlled   -can continue with diltiazem 60 mg twice daily of okay from a cardiac standpoint     -would recommend obtaining a barium swallow to assess for any other dysmotility  2  Dyspepsia-no evidence of H pylori on gastric biopsies, confirmed with the stool test    No evidence of intestinal metaplasia  No need for repeat EGD  Given ongoing symptoms of dyspepsia, would encourage taking Pepcid on a daily basis  Also discussed with patient regarding dietary modifications  Avoid eating late at night  Encourage weight loss  Avoid NSAIDs  3  Colon cancer screening-reportedly had a colonoscopy 7 years ago which was normal   Patient is not interested in having another colonoscopy at this time  No other alarm symptoms     -can consider ordering Cologuard testing in the future patient is interested  Would recommend obtaining the results of the prior colonoscopy however to assess for whether patient had any polyps  Subjective:   [de-identified] year old male with history of hiccups, presents for follow-up    He underwent EGD on October 29th by Dr Rodriguez presents for follow-up  Patient was placed on Thorazine for hiccups  His endoscopic exam was unremarkable  Gastric biopsies were equivocal for H pylori bacteria  There was no evidence of intestinal metaplasia but there was a focally active antral gastritis  He was ordered for stool antigen, which came back negative for H pylori  Patient reports that he has been taking diltiazem 60 mg twice daily and has not had any of the esophageal spasms a however he has not been taking Pepcid which was prescribed previously for symptoms of dyspepsia  He does report ongoing symptoms which include belching and bloating  He denies any hematemesis, coffee-ground emesis or melena  No unintentional weight loss, in fact has gained some weight  Patient reports that he underwent colonoscopy 7-8 years ago, reports that he underwent some sort of stool testing which was negative  I do not see the results of any Cologuard or fecal occult blood test   Patient reports that he is not interested in any colon cancer screening modalities at this time  Objective:     Vitals: Blood pressure 134/76, pulse 85, height 5' 9" (1 753 m), weight 100 kg (220 lb 12 8 oz)  ,Body mass index is 32 61 kg/m²  [unfilled]    Physical Exam:    GEN: wn/wd, NAD  HEENT: MMM, no cervical or supraclavicular LAD, anciteric  CV: RRR, no m/r/g  CHEST: CTA b/l, no w/r/r  ABD: +BS, soft, NT/ND, no hepatosplenomegaly  EXT: no c/c/e  SKIN: no rashes  NEURO: aaox3      Invasive Devices  Report    None                         Lab, Imaging and other studies:     No visits with results within 1 Day(s) from this visit     Latest known visit with results is:   Admission on 12/23/2021, Discharged on 12/23/2021   Component Date Value    WBC 12/23/2021 6 76     RBC 12/23/2021 5 39     Hemoglobin 12/23/2021 16 8     Hematocrit 12/23/2021 49 7*    MCV 12/23/2021 92     MCH 12/23/2021 31 2     MCHC 12/23/2021 33 8     RDW 12/23/2021 14 2     MPV 12/23/2021 9 5     Platelets 56/34/2804 239     nRBC 12/23/2021 0     Neutrophils Relative 12/23/2021 68     Immat GRANS % 12/23/2021 1     Lymphocytes Relative 12/23/2021 22     Monocytes Relative 12/23/2021 7     Eosinophils Relative 12/23/2021 1     Basophils Relative 12/23/2021 1     Neutrophils Absolute 12/23/2021 4 57     Immature Grans Absolute 12/23/2021 0 04     Lymphocytes Absolute 12/23/2021 1 50     Monocytes Absolute 12/23/2021 0 49     Eosinophils Absolute 12/23/2021 0 09     Basophils Absolute 12/23/2021 0 07     Sodium 12/23/2021 137     Potassium 12/23/2021 4 9     Chloride 12/23/2021 102     CO2 12/23/2021 29     ANION GAP 12/23/2021 6     BUN 12/23/2021 30*    Creatinine 12/23/2021 1 79*    Glucose 12/23/2021 107     Calcium 12/23/2021 9 0     AST 12/23/2021 26     ALT 12/23/2021 29     Alkaline Phosphatase 12/23/2021 79     Total Protein 12/23/2021 7 6     Albumin 12/23/2021 4 0     Total Bilirubin 12/23/2021 0 56     eGFR 12/23/2021 35     hs TnI 0hr 12/23/2021 16     hs TnI 2hr 12/23/2021 17     Delta 2hr hsTnI 12/23/2021 1     Ventricular Rate 12/23/2021 73     Atrial Rate 12/23/2021 73     NC Interval 12/23/2021 158     QRSD Interval 12/23/2021 92     QT Interval 12/23/2021 384     QTC Interval 12/23/2021 423     P Axis 12/23/2021 80     QRS Axis 12/23/2021 15     T Wave Axis 12/23/2021 61     Ventricular Rate 12/23/2021 57     Atrial Rate 12/23/2021 150     QRSD Interval 12/23/2021 88     QT Interval 12/23/2021 418     QTC Interval 12/23/2021 406     P Axis 12/23/2021 78     QRS Axis 12/23/2021 -1     T Wave Axis 12/23/2021 24     Ventricular Rate 12/23/2021 81     Atrial Rate 12/23/2021 86     NC Interval 12/23/2021 144     QRSD Interval 12/23/2021 88     QT Interval 12/23/2021 352     QTC Interval 12/23/2021 409     P Axis 12/23/2021 66     QRS Axis 12/23/2021 -16     T Wave Axis 12/23/2021 36          I have personally reviewed pertinent films in PACS    No current facility-administered medications for this visit

## 2022-02-02 ENCOUNTER — HOSPITAL ENCOUNTER (OUTPATIENT)
Dept: MRI IMAGING | Facility: HOSPITAL | Age: 77
Discharge: HOME | End: 2022-02-02
Payer: MEDICARE

## 2022-02-02 DIAGNOSIS — R93.0 ABNORMAL CT SCAN OF HEAD: ICD-10-CM

## 2022-02-02 PROCEDURE — 70553 MRI BRAIN STEM W/O & W/DYE: CPT

## 2022-02-02 PROCEDURE — A9585 GADOBUTROL INJECTION: HCPCS | Performed by: NURSE PRACTITIONER

## 2022-02-02 PROCEDURE — G1004 CDSM NDSC: HCPCS

## 2022-02-02 RX ADMIN — GADOBUTROL 10 ML: 604.72 INJECTION INTRAVENOUS at 12:01

## 2022-02-18 ENCOUNTER — HOSPITAL ENCOUNTER (OUTPATIENT)
Dept: NON INVASIVE DIAGNOSTICS | Facility: CLINIC | Age: 77
Discharge: HOME | End: 2022-02-18
Payer: MEDICARE

## 2022-02-18 VITALS
BODY MASS INDEX: 32.58 KG/M2 | DIASTOLIC BLOOD PRESSURE: 76 MMHG | SYSTOLIC BLOOD PRESSURE: 134 MMHG | HEIGHT: 69 IN | HEART RATE: 80 BPM | WEIGHT: 220 LBS

## 2022-02-18 DIAGNOSIS — R10.13 DYSPEPSIA: ICD-10-CM

## 2022-02-18 DIAGNOSIS — R06.6 HICCUPS: ICD-10-CM

## 2022-02-18 DIAGNOSIS — K29.00 OTHER ACUTE GASTRITIS WITHOUT HEMORRHAGE: ICD-10-CM

## 2022-02-18 LAB
AORTIC ROOT: 2.9 CM
APICAL FOUR CHAMBER EJECTION FRACTION: 64 %
ASCENDING AORTA: 2.9 CM (ref 2.15–3.21)
E WAVE DECELERATION TIME: 353 MS
FRACTIONAL SHORTENING: 29 % (ref 28–44)
INTERVENTRICULAR SEPTUM IN DIASTOLE (PARASTERNAL SHORT AXIS VIEW): 0.9 CM (ref 0.56–1.04)
INTERVENTRICULAR SEPTUM: 0.9 CM (ref 0.6–1.1)
LAAS-AP2: 21.8 CM2
LAAS-AP4: 15 CM2
LEFT ATRIUM SIZE: 4.6 CM
LEFT INTERNAL DIMENSION IN SYSTOLE: 3.4 CM (ref 3.95–5.98)
LEFT VENTRICULAR INTERNAL DIMENSION IN DIASTOLE: 4.8 CM (ref 6.57–9.78)
LEFT VENTRICULAR POSTERIOR WALL IN END DIASTOLE: 0.9 CM (ref 0.54–1.03)
LEFT VENTRICULAR STROKE VOLUME: 63 ML
LVSV (TEICH): 63 ML
MV E'TISSUE VEL-SEP: 7 CM/S
MV PEAK A VEL: 0.63 M/S
MV PEAK E VEL: 51 CM/S
MV STENOSIS PRESSURE HALF TIME: 102 MS
MV VALVE AREA P 1/2 METHOD: 2.16 CM2
RIGHT ATRIUM AREA SYSTOLE A4C: 16.4 CM2
RIGHT VENTRICLE ID DIMENSION: 3.9 CM
SL CV LEFT ATRIUM LENGTH A2C: 5.4 CM
SL CV LV EF: 65
SL CV PED ECHO LEFT VENTRICLE DIASTOLIC VOLUME (MOD BIPLANE) 2D: 110 ML
SL CV PED ECHO LEFT VENTRICLE SYSTOLIC VOLUME (MOD BIPLANE) 2D: 47 ML
Z-SCORE OF ASCENDING AORTA: 0.82
Z-SCORE OF INTERVENTRICULAR SEPTUM IN END DIASTOLE: 0.8
Z-SCORE OF LEFT VENTRICULAR DIMENSION IN END DIASTOLE: -5.12
Z-SCORE OF LEFT VENTRICULAR DIMENSION IN END SYSTOLE: -2.82
Z-SCORE OF LEFT VENTRICULAR POSTERIOR WALL IN END DIASTOLE: 0.91

## 2022-02-18 PROCEDURE — 93306 TTE W/DOPPLER COMPLETE: CPT | Performed by: INTERNAL MEDICINE

## 2022-02-18 PROCEDURE — 93306 TTE W/DOPPLER COMPLETE: CPT

## 2022-02-18 NOTE — RESULT ENCOUNTER NOTE
ECHO reviewed:   Normal pumping strength of the heart muscle  Valves: No serious abnormalities seen  Please call patient with test results

## 2022-02-25 DIAGNOSIS — K22.4 ESOPHAGEAL SPASM: ICD-10-CM

## 2022-02-25 RX ORDER — DILTIAZEM HYDROCHLORIDE 60 MG/1
CAPSULE, EXTENDED RELEASE ORAL
Qty: 60 CAPSULE | Refills: 2 | Status: SHIPPED | OUTPATIENT
Start: 2022-02-25 | End: 2022-05-26 | Stop reason: SINTOL

## 2022-03-10 ENCOUNTER — HOSPITAL ENCOUNTER (OUTPATIENT)
Dept: RADIOLOGY | Facility: HOSPITAL | Age: 77
Discharge: HOME | End: 2022-03-10
Attending: INTERNAL MEDICINE
Payer: MEDICARE

## 2022-03-10 DIAGNOSIS — R10.13 EPIGASTRIC DISCOMFORT: ICD-10-CM

## 2022-03-10 DIAGNOSIS — R06.6 INTRACTABLE HICCUPS: ICD-10-CM

## 2022-03-10 PROCEDURE — 74220 X-RAY XM ESOPHAGUS 1CNTRST: CPT

## 2022-03-15 ENCOUNTER — TELEPHONE (OUTPATIENT)
Dept: GASTROENTEROLOGY | Facility: CLINIC | Age: 77
End: 2022-03-15

## 2022-03-15 ENCOUNTER — TELEPHONE (OUTPATIENT)
Dept: GASTROENTEROLOGY | Facility: AMBULARY SURGERY CENTER | Age: 77
End: 2022-03-15

## 2022-03-15 NOTE — TELEPHONE ENCOUNTER
Pt aware of results and verbalized understanding  Pt would like to know if he should continue the Pepcid as well? Please advise  Thank you!

## 2022-03-15 NOTE — TELEPHONE ENCOUNTER
Patients GI provider:  Dr Alfonso Oquendo    Number to return call: [phone]    Reason for call: Pt calling looking for his results form his Barium Swallow of 03/10/22    Scheduled procedure/appointment date if applicable: Appt - 39/50/49

## 2022-03-15 NOTE — TELEPHONE ENCOUNTER
----- Message from Trevin Garcia MD sent at 3/15/2022  7:50 AM EDT -----  Please inform the patient that the barium swallow shows esophageal spasm, no lesions were seen  Would recommend to continue diltiazem 60 mg twice daily to control the symptoms a of esophageal spasms and hiccups

## 2022-03-17 ENCOUNTER — HOSPITAL ENCOUNTER (OUTPATIENT)
Dept: NON INVASIVE DIAGNOSTICS | Facility: CLINIC | Age: 77
Discharge: HOME | End: 2022-03-17
Payer: MEDICARE

## 2022-03-17 DIAGNOSIS — R07.2 PRECORDIAL CHEST PAIN: ICD-10-CM

## 2022-03-17 LAB
RATE PRESSURE PRODUCT: NORMAL
SL CV STRESS RECOVERY BP: NORMAL MMHG
SL CV STRESS RECOVERY HR: 98 BPM
SL CV STRESS RECOVERY O2 SAT: 97 %
STRESS ANGINA INDEX: 0
STRESS BASELINE BP: NORMAL MMHG
STRESS BASELINE HR: 80 BPM
STRESS DUKE TREADMILL SCORE: 5
STRESS O2 SAT REST: 97 %
STRESS PEAK HR: 144 BPM
STRESS PERCENT HR: 100 %
STRESS POST ESTIMATED WORKLOAD: 7 METS
STRESS POST EXERCISE DUR MIN: 5 MIN
STRESS POST O2 SAT PEAK: 96 %
STRESS POST PEAK BP: 172 MMHG
STRESS ST DEPRESSION: 0 MM
STRESS TARGET HR: 144 BPM

## 2022-03-17 PROCEDURE — 93016 CV STRESS TEST SUPVJ ONLY: CPT | Performed by: INTERNAL MEDICINE

## 2022-03-17 PROCEDURE — 93017 CV STRESS TEST TRACING ONLY: CPT

## 2022-03-17 PROCEDURE — 78452 HT MUSCLE IMAGE SPECT MULT: CPT | Performed by: INTERNAL MEDICINE

## 2022-03-17 PROCEDURE — 93018 CV STRESS TEST I&R ONLY: CPT | Performed by: INTERNAL MEDICINE

## 2022-03-17 NOTE — RESULT ENCOUNTER NOTE
Low risk stress test:  Normal EKG response to stress  Low likelihood of significant blockages in the heart arteries    Good prognosis based on this test   Please call patient with normal results on the stress test

## 2022-03-22 LAB
CHEST PAIN STATEMENT: NORMAL
MAX DIASTOLIC BP: 104 MMHG
MAX HEART RATE: 144 BPM
MAX PREDICTED HEART RATE: 143 BPM
MAX. SYSTOLIC BP: 202 MMHG
PROTOCOL NAME: NORMAL
REASON FOR TERMINATION: NORMAL
TARGET HR FORMULA: NORMAL
TEST INDICATION: NORMAL
TIME IN EXERCISE PHASE: NORMAL

## 2022-05-02 ENCOUNTER — TELEPHONE (OUTPATIENT)
Dept: GASTROENTEROLOGY | Facility: AMBULARY SURGERY CENTER | Age: 77
End: 2022-05-02

## 2022-05-26 ENCOUNTER — TELEPHONE (OUTPATIENT)
Dept: FAMILY MEDICINE CLINIC | Facility: MEDICAL CENTER | Age: 77
End: 2022-05-26

## 2022-05-26 ENCOUNTER — OFFICE VISIT (OUTPATIENT)
Dept: FAMILY MEDICINE CLINIC | Facility: MEDICAL CENTER | Age: 77
End: 2022-05-26
Payer: MEDICARE

## 2022-05-26 VITALS
BODY MASS INDEX: 33.03 KG/M2 | WEIGHT: 223 LBS | DIASTOLIC BLOOD PRESSURE: 78 MMHG | HEIGHT: 69 IN | SYSTOLIC BLOOD PRESSURE: 140 MMHG | HEART RATE: 94 BPM | OXYGEN SATURATION: 97 %

## 2022-05-26 DIAGNOSIS — Z12.83 SCREENING FOR SKIN CANCER: ICD-10-CM

## 2022-05-26 DIAGNOSIS — R06.6 HICCUPS: ICD-10-CM

## 2022-05-26 DIAGNOSIS — R10.13 EPIGASTRIC DISCOMFORT: ICD-10-CM

## 2022-05-26 DIAGNOSIS — Z76.89 ENCOUNTER TO ESTABLISH CARE WITH NEW DOCTOR: Primary | ICD-10-CM

## 2022-05-26 DIAGNOSIS — Z91.89 AT RISK FOR HEART DISEASE: ICD-10-CM

## 2022-05-26 DIAGNOSIS — J45.20 MILD INTERMITTENT ASTHMA WITHOUT COMPLICATION: ICD-10-CM

## 2022-05-26 DIAGNOSIS — L98.9 SKIN LESION OF BACK: ICD-10-CM

## 2022-05-26 DIAGNOSIS — N18.30 STAGE 3 CHRONIC KIDNEY DISEASE, UNSPECIFIED WHETHER STAGE 3A OR 3B CKD (HCC): ICD-10-CM

## 2022-05-26 DIAGNOSIS — H40.059 RAISED INTRAOCULAR PRESSURE, UNSPECIFIED LATERALITY: ICD-10-CM

## 2022-05-26 DIAGNOSIS — Z00.00 HEALTHCARE MAINTENANCE: ICD-10-CM

## 2022-05-26 DIAGNOSIS — R03.0 ELEVATED BP WITHOUT DIAGNOSIS OF HYPERTENSION: ICD-10-CM

## 2022-05-26 PROCEDURE — 99214 OFFICE O/P EST MOD 30 MIN: CPT | Performed by: STUDENT IN AN ORGANIZED HEALTH CARE EDUCATION/TRAINING PROGRAM

## 2022-05-26 RX ORDER — FAMOTIDINE 40 MG/1
TABLET, FILM COATED ORAL
Qty: 30 TABLET | Refills: 3 | Status: SHIPPED | OUTPATIENT
Start: 2022-05-26 | End: 2022-05-26 | Stop reason: SINTOL

## 2022-05-26 NOTE — TELEPHONE ENCOUNTER
[facility]  Obstetrics and Gynecology  Follow Up Progress Note    Subjective:     Lindsay Moreno is a 26year old [facility] female who was last seen in office 04/2019 and presents for multiple concerns.  The patient reports she would like to disc When checking out, pt questioned why famotidine was send in for him  He is not going to pick it up  He stopped taking it a while ago because he was having side effects      Routed to Dr Myron Youngblood Papanicolaou smear of cervix with low grade squamous intraepithelial lesion (LGSIL)        Plan:     HPV discussion   - d/w patient HPV including prognosis, diagnosis, treatment and transmission   - advised condoms use and female barrier protection to decr

## 2022-05-26 NOTE — PATIENT INSTRUCTIONS
GERD (Gastroesophageal Reflux Disease)   WHAT YOU NEED TO KNOW:   Gastroesophageal reflux disease (GERD) is reflux that happens more than 2 times a week for a few weeks  Reflux means acid and food in your stomach back up into your esophagus  GERD can cause other health problems over time if it is not treated  DISCHARGE INSTRUCTIONS:   Call your local emergency number (911 in the [address] [facility]) if:   You have severe chest pain and sudden trouble breathing  Return to the emergency department if:   You have trouble breathing after you vomit  You have trouble swallowing, or pain with swallowing  Your bowel movements are black, bloody, or tarry-looking  Your vomit looks like coffee grounds or has blood in it  Call your doctor or gastroenterologist if:   You feel full and cannot burp or vomit  You vomit large amounts, or you vomit often  You are losing weight without trying  Your symptoms get worse or do not improve with treatment  You have questions or concerns about your condition or care  Medicines:   Medicines  are used to decrease stomach acid  Medicine may also be used to help your lower esophageal sphincter and stomach contract (tighten) more  Take your medicine as directed  Contact your healthcare provider if you think your medicine is not helping or if you have side effects  Tell him of her if you are allergic to any medicine  Keep a list of the medicines, vitamins, and herbs you take  Include the amounts, and when and why you take them  Bring the list or the pill bottles to follow-up visits  Carry your medicine list with you in case of an emergency  Manage GERD:       Do not have foods or drinks that may increase heartburn  These include chocolate, peppermint, fried or fatty foods, drinks that contain caffeine, or carbonated drinks (soda)  Other foods include spicy foods, onions, tomatoes, and tomato-based foods   Do not have foods or drinks that can irritate your esophagus, such as citrus fruits, juices, and alcohol  Do not eat large meals  When you eat a lot of food at one time, your stomach needs more acid to digest it  Eat 6 small meals each day instead of 3 large ones, and eat slowly  Do not eat meals 2 to 3 hours before bedtime  Elevate the head of your bed  Place 6-inch blocks under the head of your bed frame  You may also use more than one pillow under your head and shoulders while you sleep  Maintain a healthy weight  If you are overweight, weight loss may help relieve symptoms of GERD  Do not smoke  Smoking weakens the lower esophageal sphincter and increases the risk of GERD  Ask your healthcare provider for information if you currently smoke and need help to quit  E-cigarettes or smokeless tobacco still contain nicotine  Talk to your healthcare provider before you use these products  Do not put pressure on your abdomen  Pressure pushes acid up into your esophagus  Do not wear clothing that is tight around your waist  Do not bend over  Bend at the knees if you need to pick something up  Follow up with your doctor or gastroenterologist as directed:  Write down your questions so you remember to ask them during your visits  © Copyright [employer] 2022 Information is for End User's use only and may not be sold, redistributed or otherwise used for commercial purposes  All illustrations and images included in CareNotes® are the copyrighted property of [employer]  or Becky Aguilar  The above information is an  only  It is not intended as medical advice for individual conditions or treatments  Talk to your doctor, nurse or pharmacist before following any medical regimen to see if it is safe and effective for you

## 2022-05-26 NOTE — PROGRESS NOTES
[address] [facility] - Clinic Note  Ej Bustamante, [place], 21     Guera Martinez MRN: [id number] : 1945 Age: 65 y o  Assessment/Plan     1  Encounter to establish care with new doctor    - patient presents to establish care with new provider  - patient will return to office in 3 months for follow-up    2  Skin lesion of back    - there is a current skin lesion of concern on patient's back, irregular border, various shades of brown  Advised about prompt consultation with Dermatology  - Ambulatory Referral to Dermatology; Future    3  Screening for skin cancer    - Ambulatory Referral to Dermatology; Future  - counseled about sun protection and sunscreen  - counseled about ABCDEs of melanoma    4  Stage 3 chronic kidney disease, unspecified whether stage 3a or 3b CKD ([facility] [address] )    - established with Nephrology    5  Mild intermittent asthma without complication    - patient uses Breo Ellipta inhaler daily, has not needed rescue albuterol inhaler  6  Hiccups    - patient does have history of hiccups, reflux symptoms  Patient was evaluated by Gastroenterology in the past   Diagnosed with esophageal spasm and gastritis  Patient did not tolerate Pepcid  Patient states he also did not tolerate diltiazem  Patient not currently taking either of these medications  He is following up with Gastroenterology in July  Patient states lately stressors including his wife's health concern caused exacerbation of his symptoms  Discussed general measures to avoid reflux educational material distributed  7  Raised intraocular pressure, unspecified laterality    - patient follows with Optometry and using latanoprost eyedrops    8  Elevated BP without diagnosis of hypertension    - noted upon chart review, blood pressure today 140/78  Goal less than 150/90  Can continue to monitor off pharmacotherapy at this time      9  At risk for heart disease    - patient underwent cardiac workup  The 10-year ASCVD risk score (Kat Cabrera et al , 2013) is: 30 2%    Values used to calculate the score:      Age: 65 years      Sex: Male      Is Non- : No      Diabetic: No      Tobacco smoker: No      Systolic Blood Pressure: 361 mmHg      Is BP treated: No      HDL Cholesterol: 89 mg/dL      Total Cholesterol: 233 mg/dL  - patient states he did not tolerate statin  - patient states he was advised by optometry to stop fish oil supplements as it was affecting his eye pressure    10  Healthcare maintenance    - reminded patient to complete tetanus booster at pharmacy  - patient completed COVID-19 vaccination series and 2nd booster, advised to upload card on to my chart    Felix Velasquez acknowledged understanding of treatment plan, all questions answered  Subjective      Felix Velasquez is a 65 y o  male who presents for 6 month follow up  Overall, patient feels well  Patient states he is active and participated in yard work yesterday  Patient states he did experience some exacerbation of his GI symptoms with the current stressor of his wife's health concern      The following portions of the patient's history were reviewed and updated as appropriate: allergies, current medications, past family history, past medical history, past social history, past surgical history and problem list      Past Medical History:   Diagnosis Date    Allergic     Asthma     Azotemia     BPH with obstruction/lower urinary tract symptoms     Chronic kidney disease     stage 3    Headache(784 0)     Hydronephrosis     Incomplete bladder emptying     Pneumonia     Urinary retention        Allergies   Allergen Reactions    Other      Cats, Ragweed    Pollen Extract        Past Surgical History:   Procedure Laterality Date    APPENDECTOMY      COLONOSCOPY      CYSTOSCOPY  2014    HERNIA REPAIR      KNEE SURGERY  2013    meniscus tear    ID INCISE FINGER TENDON SHEATH Right 1/19/2021    Procedure: Right long finger trigger finger release;  Surgeon: Denisha Mcmahon MD;  Location: [facility];  Service: Orthopedics    ROOT CANAL      TRANSURETHRAL RESECTION OF PROSTATE      US GUIDANCE  2018       Family History   Family history unknown: Yes   Problem Relation Age of Onset    Family history unknown: Yes    Dementia Father     Glaucoma Father     Completed Suicide  Cousin        Social History     Socioeconomic History    Marital status: /Civil Union     Spouse name: None    Number of children: None    Years of education: None    Highest education level: None   Occupational History    None   Tobacco Use    Smoking status: Former Smoker     Packs/day: 0 25     Years: 10 00     Pack years: 2 50     Types: Cigarettes     Quit date: 2011     Years since quittin 3    Smokeless tobacco: Never Used   Vaping Use    Vaping Use: Never used   Substance and Sexual Activity    Alcohol use: Yes     Alcohol/week: 2 0 standard drinks     Types: 2 Glasses of wine per week     Comment: occassional    Drug use: No    Sexual activity: None   Other Topics Concern    None   Social History Narrative    None     Social Determinants of Health     Financial Resource Strain: Not on file   Food Insecurity: Not on file   Transportation Needs: Not on file   Physical Activity: Not on file   Stress: Not on file   Social Connections: Not on file   Intimate Partner Violence: Not on file   Housing Stability: Not on file       Current Outpatient Medications   Medication Sig Dispense Refill    acetaminophen (TYLENOL) 500 mg tablet Take 1 500mg tablet in the morning prior to surgery, then 1 tablet every 6 hours for 5-7 days   30 tablet 0    BREO ELLIPTA 200-25 MCG/INH inhaler        Cholecalciferol 5000 units capsule Take 2,000 Units by mouth every other day        latanoprost (XALATAN) 0 005 % ophthalmic solution place 1 drop into both eyes at bedtime      olopatadine (PATANOL) 0 1 % ophthalmic solution 1 drop  Ventolin  (90 Base) MCG/ACT inhaler inhale 2 puffs by mouth and INTO THE LUNGS every 4 hours if needed for 30 DAYS       No current facility-administered medications for this visit  Review of Systems   Respiratory: Negative for shortness of breath  Cardiovascular: Negative for chest pain and leg swelling  Gastrointestinal: Negative for blood in stool  And as noted in HPI    Objective      /78 (BP Location: Left arm, Patient Position: Sitting, Cuff Size: Adult)   Pulse 94   Ht 5' 9" (1 753 m)   Wt 101 kg (223 lb)   SpO2 97%   BMI 32 93 kg/m²     Physical Exam  Vitals reviewed  Constitutional:       General: He is not in acute distress  Appearance: Normal appearance  He is not ill-appearing  HENT:      Head: Normocephalic and atraumatic  Right Ear: External ear normal       Left Ear: External ear normal       Nose: Nose normal       Mouth/Throat:      Mouth: Mucous membranes are moist       Pharynx: Oropharynx is clear  Eyes:      Extraocular Movements: Extraocular movements intact  Conjunctiva/sclera: Conjunctivae normal       Pupils: Pupils are equal, round, and reactive to light  Cardiovascular:      Rate and Rhythm: Normal rate and regular rhythm  Pulses: Normal pulses  Heart sounds: Normal heart sounds  No murmur heard  Pulmonary:      Effort: Pulmonary effort is normal       Breath sounds: Normal breath sounds  Abdominal:      General: Abdomen is flat  Bowel sounds are normal       Palpations: Abdomen is soft  Musculoskeletal:      Right lower leg: No edema  Left lower leg: No edema  Skin:     General: Skin is warm and dry  Capillary Refill: Capillary refill takes less than 2 seconds  Findings: Lesion (irregular border, various shades of brown as pictured ) present  Comments: Lentigines face, chest and back       Neurological:      Mental Status: He is alert and oriented to person, place, and time     Psychiatric: Mood and Affect: Mood normal          Behavior: Behavior normal          Thought Content: Thought content normal          Judgment: Judgment normal                  Some portions of this record may have been generated with voice recognition software  There may be translation, syntax, or grammatical errors  Occasional wrong word or "sound-a-like" substitutions may have occurred due to the inherent limitations of the voice recognition software  Read the chart carefully and recognize, using context, where substations may have occurred  If you have any questions, please contact the dictating provider for clarification or correction, as needed

## 2022-05-26 NOTE — TELEPHONE ENCOUNTER
It appears that GI sent this? As discussed today in office, patient states that he did not tolerate this medication well  Left voicemail

## 2022-06-20 ENCOUNTER — CONSULT (OUTPATIENT)
Dept: DERMATOLOGY | Facility: CLINIC | Age: 77
End: 2022-06-20
Payer: MEDICARE

## 2022-06-20 VITALS — HEIGHT: 69 IN | WEIGHT: 215 LBS | BODY MASS INDEX: 31.84 KG/M2 | TEMPERATURE: 97.1 F

## 2022-06-20 DIAGNOSIS — D18.01 CHERRY ANGIOMA: ICD-10-CM

## 2022-06-20 DIAGNOSIS — Z12.83 SCREENING FOR SKIN CANCER: ICD-10-CM

## 2022-06-20 DIAGNOSIS — L98.9 SKIN LESION OF BACK: ICD-10-CM

## 2022-06-20 DIAGNOSIS — L82.1 SEBORRHEIC KERATOSIS: ICD-10-CM

## 2022-06-20 DIAGNOSIS — L81.4 LENTIGO: ICD-10-CM

## 2022-06-20 DIAGNOSIS — D22.9 MULTIPLE MELANOCYTIC NEVI: Primary | ICD-10-CM

## 2022-06-20 DIAGNOSIS — L72.0 EPIDERMAL INCLUSION CYST: ICD-10-CM

## 2022-06-20 DIAGNOSIS — L85.3 XEROSIS OF SKIN: ICD-10-CM

## 2022-06-20 PROCEDURE — 99203 OFFICE O/P NEW LOW 30 MIN: CPT | Performed by: DERMATOLOGY

## 2022-06-20 NOTE — PATIENT INSTRUCTIONS
Assessment and Plan:  Based on a thorough discussion of this condition and the management approach to it (including a comprehensive discussion of the known risks, side effects and potential benefits of treatment), the patient (family) agrees to implement the following specific plan:  When outside we recommend using a wide brim hat, sunglasses, long sleeve and pants, sunscreen with SPF 78+ with reapplication every 2 hours, or SPF specific clothing   Benign, reassured  Annual skin check     Melanocytic Nevi  Melanocytic nevi ("moles") are tan or brown, raised or flat areas of the skin which have an increased number of melanocytes  Melanocytes are the cells in our body which make pigment and account for skin color  Some moles are present at birth (I e , "congenital nevi"), while others come up later in life (i e , "acquired nevi")  The sun can stimulate the body to make more moles  Sunburns are not the only thing that triggers more moles  Chronic sun exposure can do it too  Clinically distinguishing a healthy mole from melanoma may be difficult, even for experienced dermatologists  The "ABCDE's" of moles have been suggested as a means of helping to alert a person to a suspicious mole and the possible increased risk of melanoma  The suggestions for raising alert are as follows:    Asymmetry: Healthy moles tend to be symmetric, while melanomas are often asymmetric  Asymmetry means if you draw a line through the mole, the two halves do not match in color, size, shape, or surface texture  Asymmetry can be a result of rapid enlargement of a mole, the development of a raised area on a previously flat lesion, scaling, ulceration, bleeding or scabbing within the mole  Any mole that starts to demonstrate "asymmetry" should be examined promptly by a board certified dermatologist      Border: Healthy moles tend to have discrete, even borders    The border of a melanoma often blends into the normal skin and does not sharply delineate the mole from normal skin  Any mole that starts to demonstrate "uneven borders" should be examined promptly by a board certified dermatologist      Color: Healthy moles tend to be one color throughout  Melanomas tend to be made up of different colors ranging from dark black, blue, white, or red  Any mole that demonstrates a color change should be examined promptly by a board certified dermatologist      Diameter: Healthy moles tend to be smaller than 0 6 cm in size; an exception are "congenital nevi" that can be larger  Melanomas tend to grow and can often be greater than 0 6 cm (1/4 of an inch, or the size of a pencil eraser)  This is only a guideline, and many normal moles may be larger than 0 6 cm without being unhealthy  Any mole that starts to change in size (small to bigger or bigger to smaller) should be examined promptly by a board certified dermatologist      Evolving: Healthy moles tend to "stay the same "  Melanomas may often show signs of change or evolution such as a change in size, shape, color, or elevation  Any mole that starts to itch, bleed, crust, burn, hurt, or ulcerate or demonstrate a change or evolution should be examined promptly by a board certified dermatologist       Assessment and Plan:  Based on a thorough discussion of this condition and the management approach to it (including a comprehensive discussion of the known risks, side effects and potential benefits of treatment), the patient (family) agrees to implement the following specific plan:  When outside we recommend using a wide brim hat, sunglasses, long sleeve and pants, sunscreen with SPF 23+ with reapplication every 2 hours, or SPF specific clothing       What is a lentigo? A lentigo is a pigmented flat or slightly raised lesion with a clearly defined edge  Unlike an ephelis (freckle), it does not fade in the winter months  There are several kinds of lentigo    The name lentigo originally referred to its appearance resembling a small lentil  The plural of lentigo is lentigines, although lentigos is also in common use  Who gets lentigines? Lentigines can affect males and females of all ages and races  Solar lentigines are especially prevalent in fair skinned adults  Lentigines associated with syndromes are present at birth or arise during childhood  What causes lentigines? Common forms of lentigo are due to exposure to ultraviolet radiation:  Sun damage including sunburn   Indoor tanning   Phototherapy, especially photochemotherapy (PUVA)    Ionizing radiation, eg radiation therapy, can also cause lentigines  Several familial syndromes associated with widespread lentigines originate from mutations in Emanuel-MAP kinase, mTOR signaling and PTEN pathways  What is the treatment for lentigines? Most lentigines are left alone  Attempts to lighten them may not be successful  The following approaches are used:  SPF 50+ broad-spectrum sunscreen   Hydroquinone bleaching cream   Alpha hydroxy acids   Vitamin C   Retinoids   Azelaic acid   Chemical peels  Individual lesions can be permanently removed using:  Cryotherapy   Intense pulsed light   Pigment lasers    How can lentigines be prevented? Lentigines associated with exposure ultraviolet radiation can be prevented by very careful sun protection  Clothing is more successful at preventing new lentigines than are sunscreens  What is the outlook for lentigines? Lentigines usually persist  They may increase in number with age and sun exposure  Some in sun-protected sites may fade and disappear    Assessment and Plan:  Based on a thorough discussion of this condition and the management approach to it (including a comprehensive discussion of the known risks, side effects and potential benefits of treatment), the patient (family) agrees to implement the following specific plan:  Monitor for changes  Benign, reassured      Assessment and Plan:    Cherry angioma, also known as [employer] spots, are benign vascular skin lesions  A "cherry angioma" is a firm red, blue or purple papule, 0 1-1 cm in diameter  When thrombosed, they can appear black in colour until evaluated with a dermatoscope when the red or purple colour is more easily seen  Cherry angioma may develop on any part of the body but most often appear on the scalp, face, lips and trunk  An angioma is due to proliferating endothelial cells; these are the cells that line the inside of a blood vessel  Angiomas can arise in early life or later in life; the most common type of angioma is a cherry angioma  Cherry angiomas are very common in males and females of any age or race  They are more noticeable in white skin than in skin of colour  They markedly increase in number from about the age of 36  There may be a family history of similar lesions  Eruptive cherry angiomas have been rarely reported to be associated with internal malignancy  The cause of angiomas is unknown  Genetic analysis of cherry angiomas has shown that they frequently carry specific somatic missense mutations in the GNAQ and GNA11 (Q209H) genes, which are involved in other vascular and melanocytic proliferations  Assessment and Plan:  Based on a thorough discussion of this condition and the management approach to it (including a comprehensive discussion of the known risks, side effects and potential benefits of treatment), the patient (family) agrees to implement the following specific plan:  Monitor for changes  Benign, reassured      Seborrheic Keratosis  A seborrheic keratosis is a harmless warty spot that appears during adult life as a common sign of skin aging  Seborrheic keratoses can arise on any area of skin, covered or uncovered, with the usual exception of the palms and soles  They do not arise from mucous membranes  Seborrheic keratoses can have highly variable appearance  Seborrheic keratoses are extremely common   It has been estimated that over 90% of adults over the age of 61 years have one or more of them  They occur in males and females of all races, typically beginning to erupt in the 35s or 45s  They are uncommon under the age of 21 years  The precise cause of seborrhoeic keratoses is not known  Seborrhoeic keratoses are considered degenerative in nature  As time goes by, seborrheic keratoses tend to become more numerous  Some people inherit a tendency to develop a very large number of them; some people may have hundreds of them  There is no easy way to remove multiple lesions on a single occasion  Unless a specific lesion is "inflamed" and is causing pain or stinging/burning or is bleeding, most insurance companies do not authorize treatment  Assessment and Plan:  Based on a thorough discussion of this condition and the management approach to it (including a comprehensive discussion of the known risks, side effects and potential benefits of treatment), the patient (family) agrees to implement the following specific plan:  Use moisturizer like Eucerin,Cerave or Aveeno Cream 3 times a day for the dry skin            Dry skin refers to skin that feels dry to touch  Dry skin has a dull surface with a rough, scaly quality  The skin is less pliable and cracked  When dryness is severe, the skin may become inflamed and fissured  Although any body site can be dry, dry skin tends to affect the shins more than any other site  Dry skin is lacking moisture in the outer horny cell layer (stratum corneum) and this results in cracks in the skin surface  Dry skin is also called xerosis, xeroderma or asteatosis (lack of fat)  It can affect males and females of all ages  There is some racial variability in water and lipid content of the skin  Dry skin that starts in early childhood may be one of about 20 types of ichthyosis (fish-scale skin)  There is often a family history of dry skin     Dry skin is commonly seen in people with atopic dermatitis  Nearly everyone > 60 years has dry skin  Dry skin that begins later may be seen in people with certain diseases and conditions  Postmenopausal women  Hypothyroidism  Chronic renal disease   Malnutrition and weight loss   Subclinical dermatitis   Treatment with certain drugs such as oral retinoids, diuretics and epidermal growth factor receptor inhibitors      What is the treatment for dry skin? The mainstay of treatment of dry skin and ichthyosis is moisturisers/emollients  They should be applied liberally and often enough to:  Reduce itch   Improve the barrier function   Prevent entry of irritants, bacteria   Reduce transepidermal water loss  How can dry skin be prevented? Eliminate aggravating factors:  Reduce the frequency of bathing  A humidifier in winter and air conditioner in summer   Compare having a short shower with a prolonged soak in a bath  Use lukewarm, not hot, water  Replace standard soap with a substitute such as a synthetic detergent cleanser, water-miscible emollient, bath oil, anti-pruritic tar oil, colloidal oatmeal etc    Apply an emollient liberally and often, particularly shortly after bathing, and when itchy  The drier the skin, the thicker this should be, especially on the hands  What is the outlook for dry skin? A tendency to dry skin may persist life-long, or it may improve once contributing factors are controlled  Assessment and Plan:  Based on a thorough discussion of this condition and the management approach to it (including a comprehensive discussion of the known risks, side effects and potential benefits of treatment), the patient (family) agrees to implement the following specific plan:  Reassured benign   Can excise if does become painful  What are epidermal inclusion cysts? Epidermal inclusion cysts are the most common, benign cutaneous cysts   There are many different names for epidermal inclusion cysts, including epidermoid cyst, epidermal cyst, infundibular cyst, inclusion cyst, and keratin cyst  These cysts can occur anywhere on the body and typically present as nodules directly underneath the skin  There is often a visible pore or opening in the center  The cysts are freely moveable and can range from a few millimeters to several centimeters in diameter  The center of epidermoid cysts almost always contains keratin, which has a cheesy appearance, and not sebum  They also do not originate from sebaceous glands  Therefore, epidermal inclusion cysts are not the same as sebaceous cysts  Cysts may remain stable or progressively enlarge over time  There are no reliable predictive factors to tell if an epidermal inclusion cyst will enlarge, become inflamed, or remain quiescent  Infected cysts tend to become larger, turn red, and are more noticeable to the patient  There may be accompanying pain and discomfort  What causes epidermal inclusion cysts? Epidermal inclusion cysts often appear out of the blue and are not contagious  They are due to a proliferation of epidermal cells within the dermis and are more common in men than women  They occur more frequently in patients in their 20s to 45s  Epidermal inclusion cysts by themselves are usually not inherited, but they can be hereditary in rare syndromes such as Hernandez syndrome, nodular elastosis with cysts and comedones (Favre-Racouchot syndrome), and basal cell nevus syndrome (Gorlin syndrome)  Elderly patients with chronic sun-damaged skin areas have a higher likelihood of developing epidermoid cysts  They often occur in areas where hair follicles have been inflamed or repeatedly irritated are more frequent in patients with acne vulgaris  In the  period, they are called milia  Patients on BRAF inhibitors such as imiquimod and cyclosporine have a higher incidence of epidermoid cysts of the face      How do we diagnose an epidermal inclusion cyst?  Epidermoid inclusion cysts are often diagnosed by history and physical exam  There is usually no need for biopsy prior to removal   Radiographic and laboratory exams, such as ultrasound studies, are unnecessary and not typically ordered unless the practitioner suspects a genetic condition  What is the treatment for an epidermal inclusion cyst?  Inflamed, uninfected epidermal inclusion cysts rarely resolve spontaneously without therapy or surgical intervention  Treatment is not emergent unless desired by the patient  Definitive treatment is via surgical excision with walls intact  This method will prevent recurrence  This is best done when the cyst is not inflamed, to decrease the probability of rupture during surgery  A local anesthetic will be injected around the cyst  A small incision is made in the skin overlying the cyst, and contents are expressed  The incision is repaired with sutures    Another option is to use a 4mm punch biopsy with cyst extraction through the defect  Incision and drainage is often needed if the cyst is infected or inflamed  If there is surrounding cellulitis, oral antibiotic therapy may be necessary  The common agents used target methicillin sensitive Staphylococcal aureus and methicillin resistant S aureus in areas of high prevalence

## 2022-06-20 NOTE — PROGRESS NOTES
[facility] Note     Patient Name: Octavio Lewis  Encounter Date: 06/20/2022     Have you been cared for by a [facility] Dermatologist in the last 3 years and, if so, which one? No    · Have you traveled outside of the [address] [place] [address] in the past 3 months or outside of the [facility] area in the last 2 weeks? No     May we call your Preferred Phone number to discuss your specific medical information? Yes     May we leave a detailed message that includes your specific medical information? Yes      Today's Chief Concerns:   Concern #1:  Skin check    Concern #2:      Past Medical History:  Have you personally ever had or currently have any of the following? · Skin cancer (such as Melanoma, Basal Cell Carcinoma, Squamous Cell Carcinoma? (If Yes, please provide more detail)- No  · Eczema: No  · Psoriasis: No  · HIV/AIDS: No  · Hepatitis B or C: No  · Tuberculosis: No  · Systemic Immunosuppression such as Diabetes, Biologic or Immunotherapy, Chemotherapy, Organ Transplantation, Bone Marrow Transplantation (If YES, please provide more detail): No  · Radiation Treatment (If YES, please provide more detail): No  · Any other major medical conditions/concerns? (If Yes, which types)- No         Family History:  Have any of your "first degree relatives" (parent, brother, sister, or child) had any of the following       · Skin cancer such as Melanoma or Merkel Cell Carcinoma or Pancreatic Cancer? No  · Eczema, Asthma, Hay Fever or Seasonal Allergies: No  · Psoriasis or Psoriatic Arthritis: No  · Do any other medical conditions seem to run in your family? If Yes, what condition and which relatives?   No    Current Medications:   (please update all dermatological medications before printing patient's AVS!)      Current Outpatient Medications:     acetaminophen (TYLENOL) 500 mg tablet, Take 1 500mg tablet in the morning prior to surgery, then 1 tablet every 6 hours for 5-7 days , Disp: 30 tablet, Rfl: 0    BREO ELLIPTA 200-25 MCG/INH inhaler,  , Disp: , Rfl:     Cholecalciferol 5000 units capsule, Take 2,000 Units by mouth every other day  , Disp: , Rfl:     latanoprost (XALATAN) 0 005 % ophthalmic solution, place 1 drop into both eyes at bedtime, Disp: , Rfl:     olopatadine (PATANOL) 0 1 % ophthalmic solution, 1 drop  , Disp: , Rfl:     Ventolin  (90 Base) MCG/ACT inhaler, inhale 2 puffs by mouth and INTO THE LUNGS every 4 hours if needed for 30 DAYS, Disp: , Rfl:       Review of Systems:  Have you recently had or currently have any of the following? If YES, what are you doing for the problem? · Fever, chills or unintended weight loss: No  · Sudden loss or change in your vision: No  · Nausea, vomiting or blood in your stool: No  · Painful or swollen joints: No  · Wheezing or cough: No  · Changing mole or non-healing wound: YES,   · Nosebleeds: No  · Excessive sweating: No  · Easy or prolonged bleeding? No  · Over the last 2 weeks, how often have you been bothered by the following problems? · Taking little interest or pleasure in doing things: 1 - Not at All  · Feeling down, depressed, or hopeless: 1 - Not at All  · Rapid heartbeat with epinephrine:  No    · FEMALES ONLY:    · Are you pregnant or planning to become pregnant? No  · Are you currently or planning to be nursing or breast feeding? No    · Any known allergies? Allergies   Allergen Reactions    Other      Cats, Ragweed    Pollen Extract          Physical Exam:     Was a chaperone (Derm Clinical Assistant) present throughout the entire Physical Exam? Yes     Did the Dermatology Team specifically  the patient on the importance of a Full Skin Exam to be sure that nothing is missed clinically?  Yes}  o Did the patient ultimately request or accept a Full Skin Exam?  Yes  o Did the patient specifically refuse to have the areas "under-the-bra" examined by the Dermatologist? No  o Did the patient specifically refuse to have the areas "under-the-underwear" examined by the Dermatologist? No    CONSTITUTIONAL:   Vitals:    06/20/22 0819   Temp: (!) 97 1 °F (36 2 °C)   Weight: 97 5 kg (215 lb)   Height: 5' 9" (1 753 m)           PSYCH: Normal mood and affect  EYES: Normal conjunctiva  ENT: Normal lips and oral mucosa  CARDIOVASCULAR: No edema  RESPIRATORY: Normal respirations  HEME/LYMPH/IMMUNO:  No regional lymphadenopathy except as noted below in "ASSESSMENT AND PLAN BY DIAGNOSIS"    SKIN:  FULL ORGAN SYSTEM EXAM   Hair, Scalp, Ears, Face Normal except as noted below in Assessment   Neck, Cervical Chain Nodes Normal except as noted below in Assessment   Right Arm/Hand/Fingers Normal except as noted below in Assessment   Left Arm/Hand/Fingers Normal except as noted below in Assessment   Chest/Breasts/Axillae Viewed areas Normal except as noted below in Assessment   Abdomen, Umbilicus Normal except as noted below in Assessment   Back/Spine Normal except as noted below in Assessment   Groin/Genitalia/Buttocks Normal except as noted below in Assessment   Right Leg, Foot, Toes Normal except as noted below in Assessment   Left Leg, Foot, Toes Normal except as noted below in Assessment        MELANOCYTIC NEVI ("Moles")    Physical Exam:   Anatomic Location Affected:   Mostly on sun-exposed areas of the trunk and extremities   Morphological Description:  Scattered, 1-4mm round to ovoid, symmetrical-appearing, even bordered, skin colored to dark brown macules/papules, mostly in sun-exposed areas   Pertinent Positives:   Pertinent Negatives:     Additional History of Present Condition:      Assessment and Plan:  Based on a thorough discussion of this condition and the management approach to it (including a comprehensive discussion of the known risks, side effects and potential benefits of treatment), the patient (family) agrees to implement the following specific plan:   When outside we recommend using a wide brim hat, sunglasses, long sleeve and pants, sunscreen with SPF 19+ with reapplication every 2 hours, or SPF specific clothing    Benign, reassured   Annual skin check     Melanocytic Nevi  Melanocytic nevi ("moles") are tan or brown, raised or flat areas of the skin which have an increased number of melanocytes  Melanocytes are the cells in our body which make pigment and account for skin color  Some moles are present at birth (I e , "congenital nevi"), while others come up later in life (i e , "acquired nevi")  The sun can stimulate the body to make more moles  Sunburns are not the only thing that triggers more moles  Chronic sun exposure can do it too  Clinically distinguishing a healthy mole from melanoma may be difficult, even for experienced dermatologists  The "ABCDE's" of moles have been suggested as a means of helping to alert a person to a suspicious mole and the possible increased risk of melanoma  The suggestions for raising alert are as follows:    Asymmetry: Healthy moles tend to be symmetric, while melanomas are often asymmetric  Asymmetry means if you draw a line through the mole, the two halves do not match in color, size, shape, or surface texture  Asymmetry can be a result of rapid enlargement of a mole, the development of a raised area on a previously flat lesion, scaling, ulceration, bleeding or scabbing within the mole  Any mole that starts to demonstrate "asymmetry" should be examined promptly by a board certified dermatologist      Border: Healthy moles tend to have discrete, even borders  The border of a melanoma often blends into the normal skin and does not sharply delineate the mole from normal skin  Any mole that starts to demonstrate "uneven borders" should be examined promptly by a board certified dermatologist      Color: Healthy moles tend to be one color throughout  Melanomas tend to be made up of different colors ranging from dark black, blue, white, or red    Any mole that demonstrates a color change should be examined promptly by a board certified dermatologist      Diameter: Healthy moles tend to be smaller than 0 6 cm in size; an exception are "congenital nevi" that can be larger  Melanomas tend to grow and can often be greater than 0 6 cm (1/4 of an inch, or the size of a pencil eraser)  This is only a guideline, and many normal moles may be larger than 0 6 cm without being unhealthy  Any mole that starts to change in size (small to bigger or bigger to smaller) should be examined promptly by a board certified dermatologist      Evolving: Healthy moles tend to "stay the same "  Melanomas may often show signs of change or evolution such as a change in size, shape, color, or elevation  Any mole that starts to itch, bleed, crust, burn, hurt, or ulcerate or demonstrate a change or evolution should be examined promptly by a board certified dermatologist         LENTIGO    Physical Exam:   Anatomic Location Affected:  Trunk and arms    Morphological Description:  Light brown macules   Pertinent Positives:   Pertinent Negatives: Additional History of Present Condition:      Assessment and Plan:  Based on a thorough discussion of this condition and the management approach to it (including a comprehensive discussion of the known risks, side effects and potential benefits of treatment), the patient (family) agrees to implement the following specific plan:   When outside we recommend using a wide brim hat, sunglasses, long sleeve and pants, sunscreen with SPF 58+ with reapplication every 2 hours, or SPF specific clothing       What is a lentigo? A lentigo is a pigmented flat or slightly raised lesion with a clearly defined edge  Unlike an ephelis (freckle), it does not fade in the winter months  There are several kinds of lentigo  The name lentigo originally referred to its appearance resembling a small lentil   The plural of lentigo is lentigines, although lentigos is also in common use  Who gets lentigines? Lentigines can affect males and females of all ages and races  Solar lentigines are especially prevalent in fair skinned adults  Lentigines associated with syndromes are present at birth or arise during childhood  What causes lentigines? Common forms of lentigo are due to exposure to ultraviolet radiation:   Sun damage including sunburn    Indoor tanning    Phototherapy, especially photochemotherapy (PUVA)    Ionizing radiation, eg radiation therapy, can also cause lentigines  Several familial syndromes associated with widespread lentigines originate from mutations in Emanuel-MAP kinase, mTOR signaling and PTEN pathways  What is the treatment for lentigines? Most lentigines are left alone  Attempts to lighten them may not be successful  The following approaches are used:   SPF 50+ broad-spectrum sunscreen    Hydroquinone bleaching cream    Alpha hydroxy acids    Vitamin C    Retinoids    Azelaic acid    Chemical peels  Individual lesions can be permanently removed using:   Cryotherapy    Intense pulsed light    Pigment lasers    How can lentigines be prevented? Lentigines associated with exposure ultraviolet radiation can be prevented by very careful sun protection  Clothing is more successful at preventing new lentigines than are sunscreens  What is the outlook for lentigines? Lentigines usually persist  They may increase in number with age and sun exposure  Some in sun-protected sites may fade and disappear  MUSTAFA ANGIOMAS    Physical Exam:   Anatomic Location Affected:  trunk   Morphological Description:  Scattered cherry red, 1-4 mm papules   Pertinent Positives:   Pertinent Negatives:     Additional History of Present Condition:      Assessment and Plan:  Based on a thorough discussion of this condition and the management approach to it (including a comprehensive discussion of the known risks, side effects and potential benefits of treatment), the patient (family) agrees to implement the following specific plan:   Monitor for changes   Benign, reassured       Assessment and Plan:    Cherry angioma, also known as [employer] spots, are benign vascular skin lesions  A "cherry angioma" is a firm red, blue or purple papule, 0 1-1 cm in diameter  When thrombosed, they can appear black in colour until evaluated with a dermatoscope when the red or purple colour is more easily seen  Cherry angioma may develop on any part of the body but most often appear on the scalp, face, lips and trunk  An angioma is due to proliferating endothelial cells; these are the cells that line the inside of a blood vessel  Angiomas can arise in early life or later in life; the most common type of angioma is a cherry angioma  Cherry angiomas are very common in males and females of any age or race  They are more noticeable in white skin than in skin of colour  They markedly increase in number from about the age of 36  There may be a family history of similar lesions  Eruptive cherry angiomas have been rarely reported to be associated with internal malignancy  The cause of angiomas is unknown  Genetic analysis of cherry angiomas has shown that they frequently carry specific somatic missense mutations in the GNAQ and GNA11 (Q209H) genes, which are involved in other vascular and melanocytic proliferations  SEBORRHEIC KERATOSIS; NON-INFLAMED    Physical Exam:   Anatomic Location Affected:  trunk   Morphological Description:  Flat and raised, waxy, smooth to warty textured, yellow to brownish-grey to dark brown to blackish, discrete, "stuck-on" appearing papules   Pertinent Positives:   Pertinent Negatives:     Additional History of Present Condition:      Assessment and Plan:  Based on a thorough discussion of this condition and the management approach to it (including a comprehensive discussion of the known risks, side effects and potential benefits of treatment), the patient (family) agrees to implement the following specific plan:   Monitor for changes   Benign, reassured       Seborrheic Keratosis  A seborrheic keratosis is a harmless warty spot that appears during adult life as a common sign of skin aging  Seborrheic keratoses can arise on any area of skin, covered or uncovered, with the usual exception of the palms and soles  They do not arise from mucous membranes  Seborrheic keratoses can have highly variable appearance  Seborrheic keratoses are extremely common  It has been estimated that over 90% of adults over the age of 61 years have one or more of them  They occur in males and females of all races, typically beginning to erupt in the 35s or 45s  They are uncommon under the age of 21 years  The precise cause of seborrhoeic keratoses is not known  Seborrhoeic keratoses are considered degenerative in nature  As time goes by, seborrheic keratoses tend to become more numerous  Some people inherit a tendency to develop a very large number of them; some people may have hundreds of them  There is no easy way to remove multiple lesions on a single occasion  Unless a specific lesion is "inflamed" and is causing pain or stinging/burning or is bleeding, most insurance companies do not authorize treatment  XEROSIS ("DRY SKIN")    Physical Exam:   Anatomic Location Affected:  diffuse   Morphological Description:  xerosis   Pertinent Positives:   Pertinent Negatives: Additional History of Present Condition:      Assessment and Plan:  Based on a thorough discussion of this condition and the management approach to it (including a comprehensive discussion of the known risks, side effects and potential benefits of treatment), the patient (family) agrees to implement the following specific plan:   Use moisturizer like Eucerin,Cerave or Aveeno Cream 3 times a day for the dry skin               Dry skin refers to skin that feels dry to touch  Dry skin has a dull surface with a rough, scaly quality  The skin is less pliable and cracked  When dryness is severe, the skin may become inflamed and fissured  Although any body site can be dry, dry skin tends to affect the shins more than any other site  Dry skin is lacking moisture in the outer horny cell layer (stratum corneum) and this results in cracks in the skin surface  Dry skin is also called xerosis, xeroderma or asteatosis (lack of fat)  It can affect males and females of all ages  There is some racial variability in water and lipid content of the skin   Dry skin that starts in early childhood may be one of about 20 types of ichthyosis (fish-scale skin)  There is often a family history of dry skin   Dry skin is commonly seen in people with atopic dermatitis   Nearly everyone > 60 years has dry skin  Dry skin that begins later may be seen in people with certain diseases and conditions   Postmenopausal women   Hypothyroidism   Chronic renal disease    Malnutrition and weight loss    Subclinical dermatitis    Treatment with certain drugs such as oral retinoids, diuretics and epidermal growth factor receptor inhibitors      What is the treatment for dry skin? The mainstay of treatment of dry skin and ichthyosis is moisturisers/emollients  They should be applied liberally and often enough to:   Reduce itch    Improve the barrier function    Prevent entry of irritants, bacteria    Reduce transepidermal water loss  How can dry skin be prevented? Eliminate aggravating factors:   Reduce the frequency of bathing   A humidifier in winter and air conditioner in summer    Compare having a short shower with a prolonged soak in a bath   Use lukewarm, not hot, water      Replace standard soap with a substitute such as a synthetic detergent cleanser, water-miscible emollient, bath oil, anti-pruritic tar oil, colloidal oatmeal etc     Apply an emollient liberally and often, particularly shortly after bathing, and when itchy  The drier the skin, the thicker this should be, especially on the hands  What is the outlook for dry skin? A tendency to dry skin may persist life-long, or it may improve once contributing factors are controlled  EPIDERMAL INCLUSION CYST    Physical Exam:   Anatomic Location Affected:  Mid back    Morphological Description:  Subcutaneous nodule    Pertinent Positives:   Pertinent Negatives: Additional History of Present Condition:  Patient states the cyst does not bother him  Assessment and Plan:  Based on a thorough discussion of this condition and the management approach to it (including a comprehensive discussion of the known risks, side effects and potential benefits of treatment), the patient (family) agrees to implement the following specific plan:   Reassured benign    Can excise if does become painful  What are epidermal inclusion cysts? Epidermal inclusion cysts are the most common, benign cutaneous cysts  There are many different names for epidermal inclusion cysts, including epidermoid cyst, epidermal cyst, infundibular cyst, inclusion cyst, and keratin cyst  These cysts can occur anywhere on the body and typically present as nodules directly underneath the skin  There is often a visible pore or opening in the center  The cysts are freely moveable and can range from a few millimeters to several centimeters in diameter  The center of epidermoid cysts almost always contains keratin, which has a cheesy appearance, and not sebum  They also do not originate from sebaceous glands  Therefore, epidermal inclusion cysts are not the same as sebaceous cysts  Cysts may remain stable or progressively enlarge over time  There are no reliable predictive factors to tell if an epidermal inclusion cyst will enlarge, become inflamed, or remain quiescent  Infected cysts tend to become larger, turn red, and are more noticeable to the patient   There may be accompanying pain and discomfort  What causes epidermal inclusion cysts? Epidermal inclusion cysts often appear out of the blue and are not contagious  They are due to a proliferation of epidermal cells within the dermis and are more common in men than women  They occur more frequently in patients in their 20s to 45s  Epidermal inclusion cysts by themselves are usually not inherited, but they can be hereditary in rare syndromes such as Hernandez syndrome, nodular elastosis with cysts and comedones (Favre-Racouchot syndrome), and basal cell nevus syndrome (Gorlin syndrome)  Elderly patients with chronic sun-damaged skin areas have a higher likelihood of developing epidermoid cysts  They often occur in areas where hair follicles have been inflamed or repeatedly irritated are more frequent in patients with acne vulgaris  In the  period, they are called milia  Patients on BRAF inhibitors such as imiquimod and cyclosporine have a higher incidence of epidermoid cysts of the face  How do we diagnose an epidermal inclusion cyst?  Epidermoid inclusion cysts are often diagnosed by history and physical exam  There is usually no need for biopsy prior to removal   Radiographic and laboratory exams, such as ultrasound studies, are unnecessary and not typically ordered unless the practitioner suspects a genetic condition  What is the treatment for an epidermal inclusion cyst?  Inflamed, uninfected epidermal inclusion cysts rarely resolve spontaneously without therapy or surgical intervention  Treatment is not emergent unless desired by the patient  Definitive treatment is via surgical excision with walls intact  This method will prevent recurrence  This is best done when the cyst is not inflamed, to decrease the probability of rupture during surgery     - A local anesthetic will be injected around the cyst  - A small incision is made in the skin overlying the cyst, and contents are expressed  - The incision is repaired with sutures    Another option is to use a 4mm punch biopsy with cyst extraction through the defect  Incision and drainage is often needed if the cyst is infected or inflamed  If there is surrounding cellulitis, oral antibiotic therapy may be necessary  The common agents used target methicillin sensitive Staphylococcal aureus and methicillin resistant S aureus in areas of high prevalence          Scribe Attestation    I,:  Kayla Duffy MA am acting as a scribe while in the presence of the attending physician :       I,:  Bronson Gusman MD personally performed the services described in this documentation    as scribed in my presence :

## 2022-08-26 ENCOUNTER — RA CDI HCC (OUTPATIENT)
Dept: OTHER | Facility: HOSPITAL | Age: 77
End: 2022-08-26

## 2022-08-26 NOTE — PROGRESS NOTES
[address]  coding opportunities       Chart reviewed, no opportunity found: CHART REVIEWED, NO OPPORTUNITY FOUND        Patients Insurance     Medicare Insurance: Medicare

## 2022-09-01 ENCOUNTER — OFFICE VISIT (OUTPATIENT)
Dept: FAMILY MEDICINE CLINIC | Facility: MEDICAL CENTER | Age: 77
End: 2022-09-01
Payer: MEDICARE

## 2022-09-01 VITALS
HEIGHT: 69 IN | DIASTOLIC BLOOD PRESSURE: 80 MMHG | TEMPERATURE: 98.2 F | SYSTOLIC BLOOD PRESSURE: 122 MMHG | BODY MASS INDEX: 32.73 KG/M2 | WEIGHT: 221 LBS

## 2022-09-01 DIAGNOSIS — Z09 FOLLOW-UP EXAM: ICD-10-CM

## 2022-09-01 DIAGNOSIS — N18.30 STAGE 3 CHRONIC KIDNEY DISEASE, UNSPECIFIED WHETHER STAGE 3A OR 3B CKD (HCC): ICD-10-CM

## 2022-09-01 DIAGNOSIS — J45.20 MILD INTERMITTENT ASTHMA WITHOUT COMPLICATION: Primary | ICD-10-CM

## 2022-09-01 DIAGNOSIS — H40.059 RAISED INTRAOCULAR PRESSURE, UNSPECIFIED LATERALITY: ICD-10-CM

## 2022-09-01 PROBLEM — R03.0 ELEVATED BP WITHOUT DIAGNOSIS OF HYPERTENSION: Status: RESOLVED | Noted: 2021-10-29 | Resolved: 2022-09-01

## 2022-09-01 PROCEDURE — 99214 OFFICE O/P EST MOD 30 MIN: CPT | Performed by: STUDENT IN AN ORGANIZED HEALTH CARE EDUCATION/TRAINING PROGRAM

## 2022-09-01 NOTE — PROGRESS NOTES
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 Feeling nervous, anxious, or on edge 1   Not being able to stop or control worrying 0   Worrying too much about different things 1   Trouble relaxing 0   Being so restless that it is hard to sit still 0   Becoming easily annoyed or irritable 1   Feeling afraid as if something awful might happen 1   CEM-7 Total Score 4        PHQ-2/9 Depression Screening    Little interest or pleasure in doing things: 0 - not at all  Feeling down, depressed, or hopeless: 0 - not at all  PHQ-2 Score: 0  PHQ-2 Interpretation: Negative depression screen       2  Mild intermittent asthma without complication    - stable  - continue Breo Ellipta inhaler daily  - has seldom needed Ventolin inhaler p r n     3  Raised intraocular pressure, unspecified laterality    - following with Optometry  - latanoprost ophthalmic drops    4   Stage 3 chronic kidney disease, unspecified whether stage 3a or 3b CKD ([facility] [address] )    - following with Nephrology    Pete Mendoza acknowledged understanding of treatment plan, all questions answered  Subjective      Jem Anderson is a 65 y o  male presents for 3 month follow-up and follow-up elevated blood pressure  Blood pressure is normotensive today  Patient feeling well today, no acute complaints  Epigastric discomfort and hiccuping not as often an issue  He did follow-up Gastroenterology however, goes on to mention he did not prefer pharmacotherapy  He has been avoiding offending foods  Since last visit, patient has also completed skin check with Dermatology  Patient also mentions occasional episodes of anxiousness, he goes on to mention he is unsure why he feels this way as he has no reason to be anxious as he is currently retired and has time to complete all projects that he is interested in starting  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       The following portions of the patient's history were reviewed and updated as appropriate: allergies, current medications, past family history, past medical history, past social history, past surgical history and problem list      Past Medical History:   Diagnosis Date    Allergic     Asthma     Azotemia     BPH with obstruction/lower urinary tract symptoms     Chronic kidney disease     stage 3    Headache(784 0)     Hydronephrosis     Incomplete bladder emptying     Pneumonia     Urinary retention        Allergies   Allergen Reactions    Other      Cats, Ragweed    Pollen Extract        Past Surgical History:   Procedure Laterality Date    APPENDECTOMY      COLONOSCOPY      [facility]  [facility]   [address] [facility]      KNEE SURGERY  2013    meniscus tear    [place] INCISE FINGER TENDON SHEATH Right 1/19/2021    Procedure: Right long finger trigger finger release;  Surgeon: Emeka Lawson MD;  Location: [facility];  Service: Orthopedics    ROOT CANAL      TRANSURETHRAL RESECTION OF PROSTATE      US GUIDANCE  5/30/2018       Family History   Family history unknown: Yes   Problem Relation Age of Onset    Family history unknown: Yes    Dementia Father     Glaucoma Father     Completed Suicide  Cousin        Social History     Socioeconomic History    Marital status: /Civil Union     Spouse name: None    Number of children: None    Years of education: None    Highest education level: None   Occupational History    None   Tobacco Use    Smoking status: Former Smoker     Packs/day: 0 25     Years: 10 00     Pack years: 2 50     Types: Cigarettes     Quit date: 2011     Years since quittin 5    Smokeless tobacco: Never Used   Vaping Use    Vaping Use: Never used   Substance and Sexual Activity    Alcohol use: Yes     Alcohol/week: 2 0 standard drinks     Types: 2 Glasses of wine per week     Comment: occassional    Drug use: No    Sexual activity: None   Other Topics Concern    None   Social History Narrative    None     Social Determinants of Health     Financial Resource Strain: Not on file   Food Insecurity: Not on file   Transportation Needs: Not on file   Physical Activity: Not on file   Stress: Not on file   Social Connections: Not on file   Intimate Partner Violence: Not on file   Housing Stability: Not on file       Current Outpatient Medications   Medication Sig Dispense Refill    acetaminophen (TYLENOL) 500 mg tablet Take 1 500mg tablet in the morning prior to surgery, then 1 tablet every 6 hours for 5-7 days  30 tablet 0    BREO ELLIPTA 200-25 MCG/INH inhaler        Cholecalciferol 5000 units capsule Take 2,000 Units by mouth every other day        latanoprost (XALATAN) 0 005 % ophthalmic solution place 1 drop into both eyes at bedtime      olopatadine (PATANOL) 0 1 % ophthalmic solution 1 drop        Ventolin  (90 Base) MCG/ACT inhaler inhale 2 puffs by mouth and INTO THE LUNGS every 4 hours if needed for 30 DAYS       No current facility-administered medications for this visit         Review of Systems     As noted in HPI    Objective      /80 (BP Location: Left arm, Patient Position: Sitting, Cuff Size: Adult)   Temp 98 2 °F (36 8 °C)   Ht 5' 9" (1 753 m)   Wt 100 kg (221 lb)   BMI 32 64 kg/m²     Physical Exam  Vitals reviewed  Constitutional:       General: He is not in acute distress  Appearance: Normal appearance  HENT:      Head: Normocephalic and atraumatic  Right Ear: External ear normal       Left Ear: External ear normal       Nose: Nose normal       Mouth/Throat:      Mouth: Mucous membranes are moist       Pharynx: Oropharynx is clear  Eyes:      Extraocular Movements: Extraocular movements intact  Conjunctiva/sclera: Conjunctivae normal       Pupils: Pupils are equal, round, and reactive to light  Cardiovascular:      Rate and Rhythm: Normal rate and regular rhythm  Pulses: Normal pulses  Heart sounds: Normal heart sounds  Pulmonary:      Effort: Pulmonary effort is normal       Breath sounds: Normal breath sounds  No wheezing  Abdominal:      General: Abdomen is flat  Bowel sounds are normal       Palpations: Abdomen is soft  Tenderness: There is no abdominal tenderness  There is no guarding or rebound  Musculoskeletal:      Cervical back: Neck supple  Right lower leg: No edema  Left lower leg: No edema  Skin:     General: Skin is warm and dry  Capillary Refill: Capillary refill takes less than 2 seconds  Neurological:      Mental Status: He is alert and oriented to person, place, and time  Psychiatric:         Mood and Affect: Mood normal          Behavior: Behavior normal          Thought Content: Thought content normal          Judgment: Judgment normal              Some portions of this record may have been generated with voice recognition software  There may be translation, syntax, or grammatical errors  Occasional wrong word or "sound-a-like" substitutions may have occurred due to the inherent limitations of the voice recognition software   Read the chart carefully and recognize, using context, where substations may have occurred  If you have any questions, please contact the dictating provider for clarification or correction, as needed

## 2022-10-12 PROBLEM — Z00.00 MEDICARE ANNUAL WELLNESS VISIT, SUBSEQUENT: Status: RESOLVED | Noted: 2021-11-05 | Resolved: 2022-10-12

## 2022-10-12 PROBLEM — Z11.59 NEED FOR HEPATITIS C SCREENING TEST: Status: RESOLVED | Noted: 2021-11-05 | Resolved: 2022-10-12

## 2022-11-14 ENCOUNTER — OFFICE VISIT (OUTPATIENT)
Dept: FAMILY MEDICINE CLINIC | Facility: MEDICAL CENTER | Age: 77
End: 2022-11-14

## 2022-11-14 ENCOUNTER — APPOINTMENT (OUTPATIENT)
Dept: LAB | Facility: MEDICAL CENTER | Age: 77
End: 2022-11-14

## 2022-11-14 ENCOUNTER — TELEPHONE (OUTPATIENT)
Dept: FAMILY MEDICINE CLINIC | Facility: MEDICAL CENTER | Age: 77
End: 2022-11-14

## 2022-11-14 VITALS
BODY MASS INDEX: 33.62 KG/M2 | SYSTOLIC BLOOD PRESSURE: 140 MMHG | WEIGHT: 227 LBS | DIASTOLIC BLOOD PRESSURE: 82 MMHG | OXYGEN SATURATION: 96 % | HEIGHT: 69 IN | HEART RATE: 72 BPM | TEMPERATURE: 97.6 F

## 2022-11-14 DIAGNOSIS — M25.521 RIGHT ELBOW PAIN: ICD-10-CM

## 2022-11-14 DIAGNOSIS — M70.21 OLECRANON BURSITIS OF RIGHT ELBOW: Primary | ICD-10-CM

## 2022-11-14 DIAGNOSIS — R21 RASH: ICD-10-CM

## 2022-11-14 DIAGNOSIS — M70.21 OLECRANON BURSITIS OF RIGHT ELBOW: ICD-10-CM

## 2022-11-14 LAB
BASOPHILS # BLD AUTO: 0.08 THOUSANDS/ÂΜL (ref 0–0.1)
BASOPHILS NFR BLD AUTO: 1 % (ref 0–1)
EOSINOPHIL # BLD AUTO: 0.1 THOUSAND/ÂΜL (ref 0–0.61)
EOSINOPHIL NFR BLD AUTO: 2 % (ref 0–6)
ERYTHROCYTE [DISTWIDTH] IN BLOOD BY AUTOMATED COUNT: 14.1 % (ref 11.6–15.1)
HCT VFR BLD AUTO: 50.8 % (ref 36.5–49.3)
HGB BLD-MCNC: 16.7 G/DL (ref 12–17)
IMM GRANULOCYTES # BLD AUTO: 0.03 THOUSAND/UL (ref 0–0.2)
IMM GRANULOCYTES NFR BLD AUTO: 0 % (ref 0–2)
LYMPHOCYTES # BLD AUTO: 1.7 THOUSANDS/ÂΜL (ref 0.6–4.47)
LYMPHOCYTES NFR BLD AUTO: 25 % (ref 14–44)
MCH RBC QN AUTO: 30.9 PG (ref 26.8–34.3)
MCHC RBC AUTO-ENTMCNC: 32.9 G/DL (ref 31.4–37.4)
MCV RBC AUTO: 94 FL (ref 82–98)
MONOCYTES # BLD AUTO: 0.66 THOUSAND/ÂΜL (ref 0.17–1.22)
MONOCYTES NFR BLD AUTO: 10 % (ref 4–12)
NEUTROPHILS # BLD AUTO: 4.16 THOUSANDS/ÂΜL (ref 1.85–7.62)
NEUTS SEG NFR BLD AUTO: 62 % (ref 43–75)
NRBC BLD AUTO-RTO: 0 /100 WBCS
PLATELET # BLD AUTO: 242 THOUSANDS/UL (ref 149–390)
PMV BLD AUTO: 10.2 FL (ref 8.9–12.7)
RBC # BLD AUTO: 5.41 MILLION/UL (ref 3.88–5.62)
URATE SERPL-MCNC: 7.2 MG/DL (ref 3.5–8.5)
WBC # BLD AUTO: 6.73 THOUSAND/UL (ref 4.31–10.16)

## 2022-11-14 RX ORDER — SULFAMETHOXAZOLE AND TRIMETHOPRIM 800; 160 MG/1; MG/1
1 TABLET ORAL EVERY 12 HOURS SCHEDULED
Qty: 14 TABLET | Refills: 0 | Status: SHIPPED | OUTPATIENT
Start: 2022-11-14 | End: 2022-11-21

## 2022-11-14 RX ORDER — TRIAMCINOLONE ACETONIDE 1 MG/G
CREAM TOPICAL 2 TIMES DAILY
Qty: 30 G | Refills: 0 | Status: SHIPPED | OUTPATIENT
Start: 2022-11-14 | End: 2022-11-21

## 2022-11-14 NOTE — PROGRESS NOTES
[address] Note  [place], 21     Guillermo Miller MRN: [id number] : 1945 Age: 65 y o  Assessment/Plan     1  Olecranon bursitis of right elbow    - no direct trauma the patient endorses however, does note leaning on right elbow often with morning breakfast  - there is mild localized erythema and warmth right olecranon bursa, will start antibiotic, no limited range of motion  - Apply ice 20 minutes/hour  - Compression dressing  - Avoid leaning on elbows  - if refractory may need to see Sports Medicine  - discussed usually subsides spontaneously  - Uric acid; Future  - CBC and differential; Future  - sulfamethoxazole-trimethoprim (BACTRIM DS) 800-160 mg per tablet; Take 1 tablet by mouth every 12 (twelve) hours for 7 days  Dispense: 14 tablet; Refill: 0, advised about concurrent probiotic use  - advised about red flag signs and symptoms in which case to contact  - educational material distributed    2  Rash    - appears consistent with poison ivy dermatitis  - triamcinolone (KENALOG) 0 1 % cream; Apply topically 2 (two) times a day for 7 days  Dispense: 30 g; Refill: 0    Guillermo Miller acknowledged understanding of treatment plan, all questions answered  Subjective      Guillermo Miller is a 65 y o  male who presents for acute visit  States that yesterday he was going about his usual routine and after lunch time noticed swelling at right elbow  No fever, no chills  No major tenderness, when fully flexes elbow does note minimal localized discomfort at center of bursa  Patient able to move elbow without pain  Patient does not necessarily recall trauma to the area  He does offer that when he has his coffee and read newspaper in the morning he will lean right elbow on table  Patient also mentions rash on forearm right side  He has been active with yard work such as tending to leaves, etc   Related pruritus improved with Benadryl      The following portions of the patient's history were reviewed and updated as appropriate: allergies, current medications, past family history, past medical history, past social history, past surgical history and problem list      Past Medical History:   Diagnosis Date   • Allergic    • Asthma    • Azotemia    • BPH with obstruction/lower urinary tract symptoms    • Chronic kidney disease     stage 3   • Headache(784 0)    • Hydronephrosis    • Incomplete bladder emptying    • Pneumonia    • Urinary retention        Allergies   Allergen Reactions   • Other      Cats, Ragweed   • Pollen Extract        Past Surgical History:   Procedure Laterality Date   • APPENDECTOMY     • COLONOSCOPY     • CYSTOSCOPY     • HERNIA REPAIR     • KNEE SURGERY      meniscus tear   • [place] INCISE FINGER TENDON SHEATH Right 2021    Procedure: Right long finger trigger finger release;  Surgeon: Gloria Fragoso MD;  Location: [facility];  Service: Orthopedics   • ROOT CANAL     • TRANSURETHRAL RESECTION OF PROSTATE     • US GUIDANCE  2018       Family History   Family history unknown: Yes   Problem Relation Age of Onset   • Family history unknown: Yes   • Dementia Father    • Glaucoma Father    • Completed Suicide  Cousin        Social History     Socioeconomic History   • Marital status: /Civil Union     Spouse name: None   • Number of children: None   • Years of education: None   • Highest education level: None   Occupational History   • None   Tobacco Use   • Smoking status: Former Smoker     Packs/day: 0 25     Years: 10 00     Pack years: 2 50     Types: Cigarettes     Quit date: 2011     Years since quittin 7   • Smokeless tobacco: Never Used   Vaping Use   • Vaping Use: Never used   Substance and Sexual Activity   • Alcohol use:  Yes     Alcohol/week: 2 0 standard drinks     Types: 2 Glasses of wine per week     Comment: occassional   • Drug use: No   • Sexual activity: None   Other Topics Concern   • None   Social History Narrative   • None     Social Determinants of Health     Financial Resource Strain: Not on file   Food Insecurity: Not on file   Transportation Needs: Not on file   Physical Activity: Not on file   Stress: Not on file   Social Connections: Not on file   Intimate Partner Violence: Not on file   Housing Stability: Not on file       Current Outpatient Medications   Medication Sig Dispense Refill   • acetaminophen (TYLENOL) 500 mg tablet Take 1 500mg tablet in the morning prior to surgery, then 1 tablet every 6 hours for 5-7 days  30 tablet 0   • BREO ELLIPTA 200-25 MCG/INH inhaler       • Cholecalciferol 5000 units capsule Take 2,000 Units by mouth every other day       • latanoprost (XALATAN) 0 005 % ophthalmic solution place 1 drop into both eyes at bedtime     • olopatadine (PATANOL) 0 1 % ophthalmic solution 1 drop       • Ventolin  (90 Base) MCG/ACT inhaler inhale 2 puffs by mouth and INTO THE LUNGS every 4 hours if needed for 30 DAYS (Patient not taking: Reported on 11/14/2022)       No current facility-administered medications for this visit  Review of Systems     As noted in HPI    Objective      /82 (BP Location: Left arm, Patient Position: Sitting, Cuff Size: Large)   Pulse 72   Temp 97 6 °F (36 4 °C)   Ht 5' 9" (1 753 m)   Wt 103 kg (227 lb)   SpO2 96%   BMI 33 52 kg/m²     Physical Exam  Vitals reviewed  Constitutional:       General: He is not in acute distress  Appearance: Normal appearance  He is not ill-appearing or toxic-appearing  HENT:      Head: Normocephalic and atraumatic  Eyes:      Conjunctiva/sclera: Conjunctivae normal    Pulmonary:      Effort: Pulmonary effort is normal    Musculoskeletal:      Right elbow: No deformity  Swelling: bursitis  Normal range of motion  Tenderness: no significant tederness elicited  Left elbow: Normal  No swelling, deformity, effusion or lacerations  Normal range of motion  No tenderness     Skin:     General: Skin is warm and dry  Findings: Rash (Erythematous papules scattered ventral aspect of right forearm, no discharge) present  Neurological:      Mental Status: He is alert and oriented to person, place, and time  Psychiatric:         Mood and Affect: Mood normal          Behavior: Behavior normal          Thought Content: Thought content normal          Judgment: Judgment normal              Some portions of this record may have been generated with voice recognition software  There may be translation, syntax, or grammatical errors  Occasional wrong word or "sound-a-like" substitutions may have occurred due to the inherent limitations of the voice recognition software  Read the chart carefully and recognize, using context, where substations may have occurred  If you have any questions, please contact the dictating provider for clarification or correction, as needed

## 2022-11-14 NOTE — PATIENT INSTRUCTIONS
Elbow Bursitis   WHAT YOU NEED TO KNOW:   Elbow bursitis is inflammation of the bursa in your elbow  The bursa is a fluid-filled sac that acts as a cushion between a bone and a tendon  A tendon is a cord of strong tissue that connects muscles to bones  The bursa is located right under the point of your elbow  DISCHARGE INSTRUCTIONS:   Call your doctor if:   Your pain and swelling increase  Your symptoms do not improve after 10 days of treatment  You have a fever  You have questions or concerns about your condition or care  Medicines: You may need any of the following:  NSAIDs , such as ibuprofen, help decrease swelling, pain, and fever  NSAIDs can cause stomach bleeding or kidney problems in certain people  If you take blood thinner medicine, always ask your healthcare provider if NSAIDs are safe for you  Always read the medicine label and follow directions  Aspirin  helps relieve pain and swelling  Take aspirin exactly as directed by your healthcare provider  Antibiotics  help fight an infection caused by bacteria  Steroids  help decrease pain and swelling  Steroids may be given for a short time to relieve acute pain  Take your medicine as directed  Contact your healthcare provider if you think your medicine is not helping or if you have side effects  Tell him of her if you are allergic to any medicine  Keep a list of the medicines, vitamins, and herbs you take  Include the amounts, and when and why you take them  Bring the list or the pill bottles to follow-up visits  Carry your medicine list with you in case of an emergency  Manage your symptoms:   Rest your elbow as much as possible to decrease pain and swelling  Slowly start to do more each day  Return to your daily activities as directed  Apply ice to help decrease swelling and pain  Use an ice pack, or put crushed ice in a plastic bag  Cover the bag with a towel before you place it on your elbow   Apply ice for 15 to 20 minutes, 3 to 4 times each day, as directed  Use compression to help decrease swelling  Healthcare providers may wrap your arm with tape or an elastic bandage  Loosen the elastic bandage if you start to lose feeling in your fingers  Elevate (raise) your elbow above the level of your heart as often as you can  This will help decrease swelling and pain  Prop your elbow on pillows or blankets to keep it elevated comfortably  Go to physical therapy, if directed  A physical therapist teaches you exercises to help improve movement and strength, and to decrease pain  Prevent another elbow injury:   Avoid injury and pressure to your elbows  Wear elbow pads or protectors when you play sports  Do not lean on your elbows or clench your fists  Do not tightly  small items, such as tools or pens  Stretch, warm up, and cool down  Always stretch and do warmup and cool-down exercises before and after you exercise  This will help loosen your muscles and decrease stress on your elbow  Rest between workouts  Follow up with your doctor as directed:  Write down your questions so you remember to ask them during your visits  © Copyright [employer] 2022 Information is for End User's use only and may not be sold, redistributed or otherwise used for commercial purposes  All illustrations and images included in CareNotes® are the copyrighted property of A D A M , Inc  or Becky Aguilar  The above information is an  only  It is not intended as medical advice for individual conditions or treatments  Talk to your doctor, nurse or pharmacist before following any medical regimen to see if it is safe and effective for you

## 2022-11-14 NOTE — TELEPHONE ENCOUNTER
Pt called stating that he noticed a golf ball sized lump on his right elbow, he said it just popped up yesterday, pt said it only bothers him if he presses on it  Please advise

## 2022-11-25 ENCOUNTER — TELEPHONE (OUTPATIENT)
Dept: FAMILY MEDICINE CLINIC | Facility: MEDICAL CENTER | Age: 77
End: 2022-11-25

## 2022-11-25 DIAGNOSIS — M70.21 OLECRANON BURSITIS OF RIGHT ELBOW: Primary | ICD-10-CM

## 2022-11-25 NOTE — TELEPHONE ENCOUNTER
Pt stopped in office  He said Dr Sheria Felty told him if his right elbow wasn't feeling any better to let her know and she would put an order in for him to see sports medicine  Pt would like her to please place that order  He is aware Dr Sheria Felty is away for two weeks  Please call pt to let him know when order is in      Routed to Dr Sheria Felty

## 2022-12-14 ENCOUNTER — OFFICE VISIT (OUTPATIENT)
Dept: OBGYN CLINIC | Facility: MEDICAL CENTER | Age: 77
End: 2022-12-14

## 2022-12-14 ENCOUNTER — APPOINTMENT (OUTPATIENT)
Dept: RADIOLOGY | Facility: MEDICAL CENTER | Age: 77
End: 2022-12-14

## 2022-12-14 VITALS
SYSTOLIC BLOOD PRESSURE: 124 MMHG | DIASTOLIC BLOOD PRESSURE: 80 MMHG | HEIGHT: 69 IN | BODY MASS INDEX: 33.77 KG/M2 | WEIGHT: 228 LBS

## 2022-12-14 DIAGNOSIS — M25.521 RIGHT ELBOW PAIN: ICD-10-CM

## 2022-12-14 DIAGNOSIS — M70.21 OLECRANON BURSITIS OF RIGHT ELBOW: ICD-10-CM

## 2022-12-14 DIAGNOSIS — M70.21 OLECRANON BURSITIS OF RIGHT ELBOW: Primary | ICD-10-CM

## 2022-12-14 RX ORDER — ROPIVACAINE HYDROCHLORIDE 5 MG/ML
10 INJECTION, SOLUTION EPIDURAL; INFILTRATION; PERINEURAL
Status: COMPLETED | OUTPATIENT
Start: 2022-12-14 | End: 2022-12-14

## 2022-12-14 RX ADMIN — ROPIVACAINE HYDROCHLORIDE 10 ML: 5 INJECTION, SOLUTION EPIDURAL; INFILTRATION; PERINEURAL at 13:21

## 2022-12-14 NOTE — PROGRESS NOTES
1  Olecranon bursitis of right elbow  Ambulatory Referral to Sports Medicine    XR elbow 3+ vw right    Medium joint arthrocentesis: R olecranon bursa      2  Right elbow pain          Orders Placed This Encounter   Procedures   • Medium joint arthrocentesis: R olecranon bursa   • XR elbow 3+ vw right      Impression:  Right elbow pain likely secondary to olecranon bursitis  Patient was treated with antibiotics and no longer has any erythema or consitutional symptoms  We discussed different treatment options and decided to proceed with an olecranon bursa aspiration  Continue with compression wrap  Monitor for signs of infection  Patient will adjust the way he reads his morning newspaper  Return to clinic if symptoms return  Imaging Studies (I personally reviewed images in PACS and report):  Right elbow x-rays most recent to this encounter reviewed  These images show enthesopathic changes at the lateral epicondyle  There is an olecranon bursitis  No follow-ups on file  Patient is in agreement with the above plan  HPI:  Jem Anderson is a 65 y o  male  who presents for evaluation of   Chief Complaint   Patient presents with   • Right Elbow - Pain       Onset/Mechanism: Started 3 weeks ago without an injury  Location: Elbow  Radiation: Denies  Provocative: No pain  Severity: No pain  Associated Symptoms: Denies fever/chills  Treatment so far: Antibiotics      Following history reviewed and updated:  Past Medical History:   Diagnosis Date   • Allergic    • Asthma    • Azotemia    • BPH with obstruction/lower urinary tract symptoms    • Chronic kidney disease     stage 3   • Headache(784 0)    • Hydronephrosis    • Incomplete bladder emptying    • Pneumonia    • Urinary retention      Past Surgical History:   Procedure Laterality Date   • APPENDECTOMY     • COLONOSCOPY     • CYSTOSCOPY  2014   • HERNIA REPAIR     • KNEE SURGERY  2013    meniscus tear   • [place] INCISE FINGER TENDON SHEATH Right 2021    Procedure: Right long finger trigger finger release;  Surgeon: Ilda Hong MD;  Location: [facility];  Service: Orthopedics   • ROOT CANAL     • TRANSURETHRAL RESECTION OF PROSTATE     • US GUIDANCE  2018     Social History   Social History     Substance and Sexual Activity   Alcohol Use Yes   • Alcohol/week: 2 0 standard drinks   • Types: 2 Glasses of wine per week    Comment: occassional     Social History     Substance and Sexual Activity   Drug Use No     Social History     Tobacco Use   Smoking Status Former   • Packs/day: 0 25   • Years: 10 00   • Pack years: 2 50   • Types: Cigarettes   • Quit date: 2011   • Years since quittin 8   Smokeless Tobacco Never     Family History   Family history unknown: Yes   Problem Relation Age of Onset   • Family history unknown: Yes   • Dementia Father    • Glaucoma Father    • Completed Suicide  Cousin      Allergies   Allergen Reactions   • Other      Cats, Ragweed   • Pollen Extract         Constitutional:  /80   Ht 5' 9" (1 753 m)   Wt 103 kg (228 lb)   BMI 33 67 kg/m²    General: NAD  Eyes: Anicteric sclerae  Neck: Supple  Lungs: Unlabored breathing  Cardiovascular: No lower extremity edema  Skin: Intact without erythema  Neurologic: Sensation intact to light touch  Psychiatric: Mood and affect are appropriate  Right Elbow Exam     Tenderness   The patient is experiencing no tenderness  Range of Motion   The patient has normal right elbow ROM  Other   Erythema: absent  Scars: absent  Sensation: normal  Pulse: present    Comments:  Olecranon bursitis  Medium joint arthrocentesis: R olecranon bursa  Universal Protocol:  Procedure performed by:  Consent: Verbal consent obtained  Written consent not obtained    Consent given by: patient  Timeout called at: 2022 1:20 PM   Patient understanding: patient states understanding of the procedure being performed  Patient identity confirmed: verbally with patient    Supporting Documentation  Indications: joint swelling   Procedure Details  Location: elbow - R olecranon bursa  Needle size: 18 G  Ultrasound guidance: no  Medications administered: 10 mL ropivacaine 0 5 %    Aspirate amount: 13 mL  Aspirate: serous and blood-tinged    Patient tolerance: patient tolerated the procedure well with no immediate complications

## 2022-12-21 ENCOUNTER — OFFICE VISIT (OUTPATIENT)
Dept: OBGYN CLINIC | Facility: MEDICAL CENTER | Age: 77
End: 2022-12-21

## 2022-12-21 VITALS
HEART RATE: 85 BPM | BODY MASS INDEX: 33.77 KG/M2 | SYSTOLIC BLOOD PRESSURE: 160 MMHG | HEIGHT: 69 IN | DIASTOLIC BLOOD PRESSURE: 91 MMHG | WEIGHT: 228 LBS

## 2022-12-21 DIAGNOSIS — M25.521 RIGHT ELBOW PAIN: Primary | ICD-10-CM

## 2022-12-21 RX ORDER — ROPIVACAINE HYDROCHLORIDE 5 MG/ML
10 INJECTION, SOLUTION EPIDURAL; INFILTRATION; PERINEURAL
Status: COMPLETED | OUTPATIENT
Start: 2022-12-21 | End: 2022-12-21

## 2022-12-21 RX ORDER — TRIAMCINOLONE ACETONIDE 40 MG/ML
80 INJECTION, SUSPENSION INTRA-ARTICULAR; INTRAMUSCULAR
Status: COMPLETED | OUTPATIENT
Start: 2022-12-21 | End: 2022-12-21

## 2022-12-21 RX ADMIN — ROPIVACAINE HYDROCHLORIDE 10 ML: 5 INJECTION, SOLUTION EPIDURAL; INFILTRATION; PERINEURAL at 10:09

## 2022-12-21 RX ADMIN — TRIAMCINOLONE ACETONIDE 80 MG: 40 INJECTION, SUSPENSION INTRA-ARTICULAR; INTRAMUSCULAR at 10:09

## 2022-12-21 NOTE — PROGRESS NOTES
1  Right elbow pain          Orders Placed This Encounter   Procedures   • Hand/upper extremity injection   • Large joint arthrocentesis   • Medium joint arthrocentesis        Impression:  Patient is here in follow up of right elbow pain likely secondary to olecranon bursitis  Patient was treated with antibiotics and no longer has any erythema or consitutional symptoms  He previously had an olecranon bursa aspiration  We repeated aspiration and injected steroid afterwards  Continue to monitor for infection  RTC PRN  Continue compression wrap      Imaging Studies (I personally reviewed images in PACS and report):  Right elbow x-rays most recent to this encounter reviewed  These images show enthesopathic changes at the lateral epicondyle  There is an olecranon bursitis  No follow-ups on file  Patient is in agreement with the above plan  HPI:  Shelli Garland is a 65 y o  male  who presents in follow up  Here for   Chief Complaint   Patient presents with   • Right Elbow - Follow-up       Since last visit: See above      Following history reviewed and updated:  Past Medical History:   Diagnosis Date   • Allergic    • Asthma    • Azotemia    • BPH with obstruction/lower urinary tract symptoms    • Chronic kidney disease     stage 3   • Headache(784 0)    • Hydronephrosis    • Incomplete bladder emptying    • Pneumonia    • Urinary retention      Past Surgical History:   Procedure Laterality Date   • APPENDECTOMY     • COLONOSCOPY     • CYSTOSCOPY  2014   • HERNIA REPAIR     • KNEE SURGERY  2013    meniscus tear   • FL INCISE FINGER TENDON SHEATH Right 1/19/2021    Procedure: Right long finger trigger finger release;  Surgeon: Mandy Alexander MD;  Location: [facility];  Service: Orthopedics   • ROOT CANAL     • TRANSURETHRAL RESECTION OF PROSTATE     • US GUIDANCE  5/30/2018     Social History   Social History     Substance and Sexual Activity   Alcohol Use Yes   • Alcohol/week: 2 0 standard drinks   • Types: 2 Glasses of wine per week    Comment: occassional     Social History     Substance and Sexual Activity   Drug Use No     Social History     Tobacco Use   Smoking Status Former   • Packs/day: 0 25   • Years: 10 00   • Pack years: 2 50   • Types: Cigarettes   • Quit date: 2011   • Years since quittin 8   Smokeless Tobacco Never     Family History   Family history unknown: Yes   Problem Relation Age of Onset   • Family history unknown: Yes   • Dementia Father    • Glaucoma Father    • Completed Suicide  Cousin      Allergies   Allergen Reactions   • Other      Cats, Ragweed   • Pollen Extract         Constitutional:  /91   Pulse 85   Ht 5' 9" (1 753 m)   Wt 103 kg (228 lb)   BMI 33 67 kg/m²    General: NAD  Eyes: Clear sclerae  ENT: No inflammation, lesion, or mass of lips  No tracheal deviation  Musculoskeletal: As mentioned below  Integumentary: No visible rashes or skin lesions  Pulmonary/Chest: Effort normal  No respiratory distress  Neuro: CN's grossly intact, MORALES  Psych: Normal affect and judgement  Vascular: WWP  Right Elbow Exam     Tenderness   The patient is experiencing no tenderness  Range of Motion   The patient has normal right elbow ROM  Muscle Strength   The patient has normal right elbow strength  Other   Erythema: absent  Scars: absent  Sensation: normal  Pulse: present    Comments:  Olecranon bursitis  Medium joint arthrocentesis: R olecranon bursa  Universal Protocol:  Procedure performed by:  Consent: Verbal consent obtained  Written consent not obtained    Risks and benefits: risks, benefits and alternatives were discussed  Consent given by: patient  Timeout called at: 2022 10:08 AM   Patient understanding: patient states understanding of the procedure being performed  Patient identity confirmed: verbally with patient    Supporting Documentation  Indications: joint swelling   Procedure Details  Location: elbow - R olecranon bursa  Needle size: 18 G  Ultrasound guidance: no  Medications administered: 10 mL ropivacaine 0 5 %; 80 mg triamcinolone acetonide 40 mg/mL    Aspirate: serous    Patient tolerance: patient tolerated the procedure well with no immediate complications

## 2023-03-03 ENCOUNTER — OFFICE VISIT (OUTPATIENT)
Dept: FAMILY MEDICINE CLINIC | Facility: MEDICAL CENTER | Age: 78
End: 2023-03-03

## 2023-03-03 ENCOUNTER — TELEPHONE (OUTPATIENT)
Dept: FAMILY MEDICINE CLINIC | Facility: MEDICAL CENTER | Age: 78
End: 2023-03-03

## 2023-03-03 VITALS
HEART RATE: 86 BPM | WEIGHT: 225.4 LBS | BODY MASS INDEX: 33.38 KG/M2 | DIASTOLIC BLOOD PRESSURE: 88 MMHG | OXYGEN SATURATION: 97 % | HEIGHT: 69 IN | SYSTOLIC BLOOD PRESSURE: 142 MMHG

## 2023-03-03 DIAGNOSIS — Z13.220 SCREENING, LIPID: ICD-10-CM

## 2023-03-03 DIAGNOSIS — Z00.00 MEDICARE ANNUAL WELLNESS VISIT, SUBSEQUENT: Primary | ICD-10-CM

## 2023-03-03 DIAGNOSIS — Z13.1 SCREENING FOR DIABETES MELLITUS (DM): ICD-10-CM

## 2023-03-03 DIAGNOSIS — E66.9 OBESITY (BMI 30-39.9): ICD-10-CM

## 2023-03-03 NOTE — TELEPHONE ENCOUNTER
Yes, more than a year since last pneumonia vaccine I would recommend PCV 20 vaccine  Patient may return for nurse visit

## 2023-03-03 NOTE — PATIENT INSTRUCTIONS
Medicare Preventive Visit Patient Instructions  Thank you for completing your Welcome to Medicare Visit or Medicare Annual Wellness Visit today  Your next wellness visit will be due in one year (3/3/2024)  The screening/preventive services that you may require over the next 5-10 years are detailed below  Some tests may not apply to you based off risk factors and/or age  Screening tests ordered at today's visit but not completed yet may show as past due  Also, please note that scanned in results may not display below  Preventive Screenings:  Service Recommendations Previous Testing/Comments   Colorectal Cancer Screening  · Colonoscopy    · Fecal Occult Blood Test (FOBT)/Fecal Immunochemical Test (FIT)  · Fecal DNA/Cologuard Test  · Flexible Sigmoidoscopy Age: 39-70 years old   Colonoscopy: every 10 years (May be performed more frequently if at higher risk)  OR  FOBT/FIT: every 1 year  OR  Cologuard: every 3 years  OR  Sigmoidoscopy: every 5 years  Screening may be recommended earlier than age 39 if at higher risk for colorectal cancer  Also, an individualized decision between you and your healthcare provider will decide whether screening between the ages of 74-80 would be appropriate   Colonoscopy: Not on file  FOBT/FIT: Not on file  Cologuard: Not on file  Sigmoidoscopy: Not on file          Prostate Cancer Screening Individualized decision between patient and health care provider in men between ages of 53-78   Medicare will cover every 12 months beginning on the day after your 50th birthday PSA: No results in last 5 years     Screening Not Indicated     Hepatitis C Screening Once for adults born between 1945 and 1965  More frequently in patients at high risk for Hepatitis C Hep C Antibody: 11/11/2021    Screening Current   Diabetes Screening 1-2 times per year if you're at risk for diabetes or have pre-diabetes Fasting glucose: 85 mg/dL (11/11/2021)  A1C: No results in last 5 years (No results in last 5 years) Cholesterol Screening Once every 5 years if you don't have a lipid disorder  May order more often based on risk factors  Lipid panel: 11/11/2021  Screening Current      Other Preventive Screenings Covered by Medicare:  1  Abdominal Aortic Aneurysm (AAA) Screening: covered once if your at risk  You're considered to be at risk if you have a family history of AAA or a male between the age of 73-68 who smoking at least 100 cigarettes in your lifetime  2  Lung Cancer Screening: covers low dose CT scan once per year if you meet all of the following conditions: (1) Age 50-69; (2) No signs or symptoms of lung cancer; (3) Current smoker or have quit smoking within the last 15 years; (4) You have a tobacco smoking history of at least 20 pack years (packs per day x number of years you smoked); (5) You get a written order from a healthcare provider  3  Glaucoma Screening: covered annually if you're considered high risk: (1) You have diabetes OR (2) Family history of glaucoma OR (3)  aged 48 and older OR (3)  American aged 72 and older  3  Osteoporosis Screening: covered every 2 years if you meet one of the following conditions: (1) Have a vertebral abnormality; (2) On glucocorticoid therapy for more than 3 months; (3) Have primary hyperparathyroidism; (4) On osteoporosis medications and need to assess response to drug therapy  5  HIV Screening: covered annually if you're between the age of 12-76  Also covered annually if you are younger than 13 and older than 72 with risk factors for HIV infection  For pregnant patients, it is covered up to 3 times per pregnancy      Immunizations:  Immunization Recommendations   Influenza Vaccine Annual influenza vaccination during flu season is recommended for all persons aged >= 6 months who do not have contraindications   Pneumococcal Vaccine   * Pneumococcal conjugate vaccine = PCV13 (Prevnar 13), PCV15 (Vaxneuvance), PCV20 (Prevnar 20)  * Pneumococcal polysaccharide vaccine = PPSV23 (Pneumovax) Adults 2364 years old: 1-3 doses may be recommended based on certain risk factors  Adults 72 years old: 1-2 doses may be recommended based off what pneumonia vaccine you previously received   Hepatitis B Vaccine 3 dose series if at intermediate or high risk (ex: diabetes, end stage renal disease, liver disease)   Tetanus (Td) Vaccine - COST NOT COVERED BY MEDICARE PART B Following completion of primary series, a booster dose should be given every 10 years to maintain immunity against tetanus  Td may also be given as tetanus wound prophylaxis  Tdap Vaccine - COST NOT COVERED BY MEDICARE PART B Recommended at least once for all adults  For pregnant patients, recommended with each pregnancy  Shingles Vaccine (Shingrix) - COST NOT COVERED BY MEDICARE PART B  2 shot series recommended in those aged 48 and above     Health Maintenance Due:      Topic Date Due   • Hepatitis C Screening  Completed     Immunizations Due:      Topic Date Due   • COVID-19 Vaccine (5 - Booster for Myrna Berliner series) 07/08/2022     Advance Directives   What are advance directives? Advance directives are legal documents that state your wishes and plans for medical care  These plans are made ahead of time in case you lose your ability to make decisions for yourself  Advance directives can apply to any medical decision, such as the treatments you want, and if you want to donate organs  What are the types of advance directives? There are many types of advance directives, and each state has rules about how to use them  You may choose a combination of any of the following:  · Living will: This is a written record of the treatment you want  You can also choose which treatments you do not want, which to limit, and which to stop at a certain time  This includes surgery, medicine, IV fluid, and tube feedings  · Durable power of  for healthcare [facility]):   This is a written record that states who you want to make healthcare choices for you when you are unable to make them for yourself  This person, called a proxy, is usually a family member or a friend  You may choose more than 1 proxy  · Do not resuscitate (DNR) order:  A DNR order is used in case your heart stops beating or you stop breathing  It is a request not to have certain forms of treatment, such as CPR  A DNR order may be included in other types of advance directives  · Medical directive: This covers the care that you want if you are in a coma, near death, or unable to make decisions for yourself  You can list the treatments you want for each condition  Treatment may include pain medicine, surgery, blood transfusions, dialysis, IV or tube feedings, and a ventilator (breathing machine)  · Values history: This document has questions about your views, beliefs, and how you feel and think about life  This information can help others choose the care that you would choose  Why are advance directives important? An advance directive helps you control your care  Although spoken wishes may be used, it is better to have your wishes written down  Spoken wishes can be misunderstood, or not followed  Treatments may be given even if you do not want them  An advance directive may make it easier for your family to make difficult choices about your care  Weight Management   Why it is important to manage your weight:  Being overweight increases your risk of health conditions such as heart disease, high blood pressure, type 2 diabetes, and certain types of cancer  It can also increase your risk for osteoarthritis, sleep apnea, and other respiratory problems  Aim for a slow, steady weight loss  Even a small amount of weight loss can lower your risk of health problems  How to lose weight safely:  A safe and healthy way to lose weight is to eat fewer calories and get regular exercise   You can lose up about 1 pound a week by decreasing the number of calories you eat by 500 calories each day  Healthy meal plan for weight management:  A healthy meal plan includes a variety of foods, contains fewer calories, and helps you stay healthy  A healthy meal plan includes the following:  · Eat whole-grain foods more often  A healthy meal plan should contain fiber  Fiber is the part of grains, fruits, and vegetables that is not broken down by your body  Whole-grain foods are healthy and provide extra fiber in your diet  Some examples of whole-grain foods are whole-wheat breads and pastas, oatmeal, brown rice, and bulgur  · Eat a variety of vegetables every day  Include dark, leafy greens such as spinach, kale, too greens, and mustard greens  Eat yellow and orange vegetables such as carrots, sweet potatoes, and winter squash  · Eat a variety of fruits every day  Choose fresh or canned fruit (canned in its own juice or light syrup) instead of juice  Fruit juice has very little or no fiber  · Eat low-fat dairy foods  Drink fat-free (skim) milk or 1% milk  Eat fat-free yogurt and low-fat cottage cheese  Try low-fat cheeses such as mozzarella and other reduced-fat cheeses  · Choose meat and other protein foods that are low in fat  Choose beans or other legumes such as split peas or lentils  Choose fish, skinless poultry (chicken or turkey), or lean cuts of red meat (beef or pork)  Before you cook meat or poultry, cut off any visible fat  · Use less fat and oil  Try baking foods instead of frying them  Add less fat, such as margarine, sour cream, regular salad dressing and mayonnaise to foods  Eat fewer high-fat foods  Some examples of high-fat foods include french fries, doughnuts, ice cream, and cakes  · Eat fewer sweets  Limit foods and drinks that are high in sugar  This includes candy, cookies, regular soda, and sweetened drinks  Exercise:  Exercise at least 30 minutes per day on most days of the week   Some examples of exercise include walking, biking, dancing, and swimming  You can also fit in more physical activity by taking the stairs instead of the elevator or parking farther away from stores  Ask your healthcare provider about the best exercise plan for you  © Copyright [employer] 2018 Information is for End User's use only and may not be sold, redistributed or otherwise used for commercial purposes   All illustrations and images included in CareNotes® are the copyrighted property of [employer]  or [address] [place] [address] [employer]

## 2023-03-03 NOTE — PROGRESS NOTES
Assessment and Plan:     Problem List Items Addressed This Visit        Other    Medicare annual wellness visit, subsequent - Primary     · Declines COVID-19 bivalent vaccine  · Will clarify about PCV-20 status from pharmacy, will return for nurse visit if not completed         Other Visit Diagnoses     Obesity (BMI 30-39  9)        Relevant Orders    Lipid Panel with Direct LDL reflex    Basic metabolic panel    Screening, lipid        Relevant Orders    Lipid Panel with Direct LDL reflex    Screening for diabetes mellitus (DM)        Relevant Orders    Basic metabolic panel        BMI Counseling: Body mass index is 33 29 kg/m²  The BMI is above normal  Nutrition recommendations include encouraging healthy choices of fruits and vegetables and increasing intake of lean protein  Exercise recommendations include exercising 3-5 times per week  Rationale for BMI follow-up plan is due to patient being overweight or obese  Depression Screening and Follow-up Plan: Patient was screened for depression during today's encounter  They screened negative with a PHQ-2 score of 0  Preventive health issues were discussed with patient, and age appropriate screening tests were ordered as noted in patient's After Visit Summary  Personalized health advice and appropriate referrals for health education or preventive services given if needed, as noted in patient's After Visit Summary  Follow-up in 6 months and sooner as needed     History of Present Illness:     Patient presents for a Medicare Wellness Visit    [facility]   Patient Care Team:  Jayden Graves DO as PCP - General (Family Medicine)     Review of Systems:     Review of Systems     As noted in HPI      Problem List:     Patient Active Problem List   Diagnosis   • BPH with obstruction/lower urinary tract symptoms   • Class 1 drug-induced obesity without serious comorbidity with body mass index (BMI) of 33 0 to 33 9 in adult   • Epigastric discomfort   • Intractable hiccups • CKD (chronic kidney disease) stage 3, GFR 30-59 ml/min ([facility])   • Asthma   • Medicare annual wellness visit, subsequent   • Gastritis without bleeding   • Lactose intolerance   • Abnormal CT scan of head   • Precordial chest pain   • Increased pressure in the eye   • Right elbow pain      Past Medical and Surgical History:     Past Medical History:   Diagnosis Date   • Allergic    • Asthma    • Azotemia    • BPH with obstruction/lower urinary tract symptoms    • Chronic kidney disease     stage 3   • Headache(784 0)    • Hydronephrosis    • Incomplete bladder emptying    • Pneumonia    • Urinary retention      Past Surgical History:   Procedure Laterality Date   • APPENDECTOMY     • COLONOSCOPY     • CYSTOSCOPY     • HERNIA REPAIR     • KNEE SURGERY      meniscus tear   • [place] TENDON SHEATH INCISION Right 2021    Procedure: Right long finger trigger finger release;  Surgeon: Norman Vega MD;  Location: [facility];  Service: Orthopedics   • ROOT CANAL     • TRANSURETHRAL RESECTION OF PROSTATE     • US GUIDANCE  2018      Family History:     Family History   Family history unknown: Yes   Problem Relation Age of Onset   • Family history unknown: Yes   • Dementia Father    • Glaucoma Father    • Completed Suicide  Cousin       Social History:     Social History     Socioeconomic History   • Marital status: /Civil Union     Spouse name: None   • Number of children: None   • Years of education: None   • Highest education level: None   Occupational History   • None   Tobacco Use   • Smoking status: Former     Packs/day: 0 25     Years: 12 00     Pack years: 3 00     Types: Cigarettes     Quit date: 2011     Years since quittin 0   • Smokeless tobacco: Former   Vaping Use   • Vaping Use: Never used   Substance and Sexual Activity   • Alcohol use:  Yes     Alcohol/week: 2 0 standard drinks     Types: 2 Glasses of wine per week     Comment: occassional   • Drug use: No   • Sexual activity: None   Other Topics Concern   • None   Social History Narrative   • None     Social Determinants of Health     Financial Resource Strain: Low Risk    • Difficulty of Paying Living Expenses: Not hard at all   Food Insecurity: Not on file   Transportation Needs: No Transportation Needs   • Lack of Transportation (Medical): No   • Lack of Transportation (Non-Medical): No   Physical Activity: Not on file   Stress: Not on file   Social Connections: Not on file   Intimate Partner Violence: Not on file   Housing Stability: Not on file      Medications and Allergies:     Current Outpatient Medications   Medication Sig Dispense Refill   • acetaminophen (TYLENOL) 500 mg tablet Take 1 500mg tablet in the morning prior to surgery, then 1 tablet every 6 hours for 5-7 days  30 tablet 0   • BREO ELLIPTA 200-25 MCG/INH inhaler       • Cholecalciferol 5000 units capsule Take 2,000 Units by mouth every other day       • latanoprost (XALATAN) 0 005 % ophthalmic solution place 1 drop into both eyes at bedtime     • olopatadine (PATANOL) 0 1 % ophthalmic solution 1 drop       • Ventolin  (90 Base) MCG/ACT inhaler        No current facility-administered medications for this visit  Allergies   Allergen Reactions   • Other      Cats, Ragweed   • Pollen Extract       Immunizations:     Immunization History   Administered Date(s) Administered   • COVID-19 MODERNA VACC 0 5 ML IM 07/01/2021, 08/01/2021, 11/10/2021, 05/13/2022   • INFLUENZA 02/26/2014, 10/23/2017, 08/22/2018, 10/08/2019, 10/05/2021, 09/19/2022   • Influenza, Seasonal Vaccine, Quadrivalent, Adjuvanted,   5e 10/28/2020   • Pneumococcal Conjugate 13-Valent 09/19/2018   • Pneumococcal Polysaccharide PPV23 12/10/2019   • Tdap 05/31/2022   • Zoster 10/02/2015   • Zoster Vaccine Recombinant 08/22/2018, 11/28/2018      Health Maintenance:         Topic Date Due   • Hepatitis C Screening  Completed     There are no preventive care reminders to display for this patient  Medicare Screening Tests and Risk Assessments:     Haylee Gardner is here for his Subsequent Wellness visit  Health Risk Assessment:   Patient rates overall health as very good  Patient feels that their physical health rating is same  Patient is very satisfied with their life  Eyesight was rated as same  Hearing was rated as same  Patient feels that their emotional and mental health rating is same  Patients states they are never, rarely angry  Patient states they are sometimes unusually tired/fatigued  Pain experienced in the last 7 days has been none  Patient states that he has experienced no weight loss or gain in last 6 months  Depression Screening:   PHQ-2 Score: 0      Fall Risk Screening: In the past year, patient has experienced: no history of falling in past year      Home Safety:  Patient does not have trouble with stairs inside or outside of their home  Patient has working smoke alarms and has working carbon monoxide detector  Home safety hazards include: none  Nutrition:   Current diet is Regular  Medications:   Patient is currently taking over-the-counter supplements  OTC medications include: see medication list  Patient is able to manage medications  Activities of Daily Living (ADLs)/Instrumental Activities of Daily Living (IADLs):   Walk and transfer into and out of bed and chair?: Yes  Dress and groom yourself?: Yes    Bathe or shower yourself?: Yes    Feed yourself? Yes  Do your laundry/housekeeping?: Yes  Manage your money, pay your bills and track your expenses?: Yes  Make your own meals?: Yes    Do your own shopping?: Yes    Previous Hospitalizations:   Any hospitalizations or ED visits within the last 12 months?: No      Advance Care Planning:   Living will: Yes    Durable POA for healthcare:  Yes    Advanced directive: Yes      Cognitive Screening:   Provider or family/friend/caregiver concerned regarding cognition?: No    PREVENTIVE SCREENINGS      Cardiovascular Screening: General: Screening Current    Due for: Lipid Panel      Diabetes Screening:     General: Risks and Benefits Discussed    Due for: Blood Glucose      Colorectal Cancer Screening:     General: Risks and Benefits Discussed and Patient Declines      Prostate Cancer Screening:    General: Screening Not Indicated      Osteoporosis Screening:    General: Screening Not Indicated      Abdominal Aortic Aneurysm (AAA) Screening:    Risk factors include: tobacco use        General: Screening Not Indicated      Lung Cancer Screening:     General: Screening Not Indicated      Hepatitis C Screening:    General: Screening Current    Screening, Brief Intervention, and Referral to Treatment (SBIRT)    Screening  Typical number of drinks in a day: 1  Typical number of drinks in a week: 2  Interpretation: Low risk drinking behavior  Single Item Drug Screening:  How often have you used an illegal drug (including marijuana) or a prescription medication for non-medical reasons in the past year? never    Single Item Drug Screen Score: 0  Interpretation: Negative screen for possible drug use disorder    Other Counseling Topics:   Car/seat belt/driving safety and sunscreen  Physical Exam:     /88 (BP Location: Left arm, Patient Position: Sitting, Cuff Size: Large)   Pulse 86   Ht 5' 9" (1 753 m)   Wt 102 kg (225 lb 6 4 oz)   SpO2 97%   BMI 33 29 kg/m²     Physical Exam  Vitals reviewed  Constitutional:       General: He is not in acute distress  Appearance: Normal appearance  HENT:      Head: Normocephalic and atraumatic  Nose: Nose normal       Mouth/Throat:      Mouth: Mucous membranes are moist       Pharynx: Oropharynx is clear  Eyes:      Extraocular Movements: Extraocular movements intact  Conjunctiva/sclera: Conjunctivae normal       Pupils: Pupils are equal, round, and reactive to light  Cardiovascular:      Rate and Rhythm: Normal rate and regular rhythm  Pulses: Normal pulses  Heart sounds: Normal heart sounds  Pulmonary:      Effort: Pulmonary effort is normal       Breath sounds: Normal breath sounds  Abdominal:      General: Bowel sounds are normal       Palpations: Abdomen is soft  Tenderness: There is no abdominal tenderness  Musculoskeletal:      Cervical back: Neck supple  Right lower leg: No edema  Left lower leg: No edema  Lymphadenopathy:      Cervical: No cervical adenopathy  Skin:     General: Skin is warm and dry  Neurological:      Mental Status: He is alert and oriented to person, place, and time  Psychiatric:         Mood and Affect: Mood normal          Behavior: Behavior normal          Thought Content:  Thought content normal          Judgment: Judgment normal           Raymundo Buckleys, DO

## 2023-03-03 NOTE — TELEPHONE ENCOUNTER
Pt called for 2 reasons:    1) He said he has about half of a tube of triamcinolone cream   He will call when he needs to have a refill  2) He went to his pharmacy to check on the pneumonia shot  They told him he has had the PCV13 and the PCV 23  Pt would like to know if he still needs to get the PCV20      Routed to Dr Dania Morris

## 2023-03-03 NOTE — ASSESSMENT & PLAN NOTE
· Declines COVID-19 bivalent vaccine  · Will clarify about PCV-20 status from pharmacy, will return for nurse visit if not completed

## 2023-03-06 ENCOUNTER — CLINICAL SUPPORT (OUTPATIENT)
Dept: FAMILY MEDICINE CLINIC | Facility: MEDICAL CENTER | Age: 78
End: 2023-03-06

## 2023-03-06 DIAGNOSIS — Z23 IMMUNIZATION DUE: Primary | ICD-10-CM

## 2023-05-02 PROBLEM — Z00.00 MEDICARE ANNUAL WELLNESS VISIT, SUBSEQUENT: Status: RESOLVED | Noted: 2021-11-05 | Resolved: 2023-05-02

## 2023-09-21 ENCOUNTER — OFFICE VISIT (OUTPATIENT)
Dept: FAMILY MEDICINE CLINIC | Facility: MEDICAL CENTER | Age: 78
End: 2023-09-21
Payer: MEDICARE

## 2023-09-21 VITALS
WEIGHT: 221.6 LBS | HEIGHT: 69 IN | RESPIRATION RATE: 16 BRPM | BODY MASS INDEX: 32.82 KG/M2 | OXYGEN SATURATION: 97 % | HEART RATE: 88 BPM | SYSTOLIC BLOOD PRESSURE: 146 MMHG | DIASTOLIC BLOOD PRESSURE: 86 MMHG | TEMPERATURE: 98.2 F

## 2023-09-21 DIAGNOSIS — N18.30 STAGE 3 CHRONIC KIDNEY DISEASE, UNSPECIFIED WHETHER STAGE 3A OR 3B CKD (HCC): ICD-10-CM

## 2023-09-21 DIAGNOSIS — E66.1 CLASS 1 DRUG-INDUCED OBESITY WITHOUT SERIOUS COMORBIDITY WITH BODY MASS INDEX (BMI) OF 32.0 TO 32.9 IN ADULT: ICD-10-CM

## 2023-09-21 DIAGNOSIS — R03.0 ELEVATED BLOOD PRESSURE READING: ICD-10-CM

## 2023-09-21 DIAGNOSIS — R09.81 SINUS CONGESTION: ICD-10-CM

## 2023-09-21 DIAGNOSIS — J45.20 MILD INTERMITTENT ASTHMA WITHOUT COMPLICATION: ICD-10-CM

## 2023-09-21 DIAGNOSIS — Z09 FOLLOW-UP EXAM, 3-6 MONTHS SINCE PREVIOUS EXAM: Primary | ICD-10-CM

## 2023-09-21 PROCEDURE — 99214 OFFICE O/P EST MOD 30 MIN: CPT | Performed by: STUDENT IN AN ORGANIZED HEALTH CARE EDUCATION/TRAINING PROGRAM

## 2023-09-21 RX ORDER — FLUTICASONE PROPIONATE 50 MCG
1 SPRAY, SUSPENSION (ML) NASAL DAILY
Qty: 18.2 ML | Refills: 1 | Status: SHIPPED | OUTPATIENT
Start: 2023-09-21

## 2023-09-21 NOTE — PROGRESS NOTES
[address] - Clinic Note  Benny Covert, [place], 23     Victorina Black MRN: [id number] : 1945 Age: 77 y.o. Assessment/Plan     1. Follow-up exam, 3-6 months since previous exam    -Patient presents for medical follow-up today  -Again revisited about 3/7/2023 blood work  -Advised about immunizations, declines COVID-19 vaccine at this time  -He will return to office in 1 to 2 weeks for influenza vaccine and blood pressure recheck  -We will also return to office in 6 months for next medical follow-up    2. Mild intermittent asthma without complication    -Follows with allergist, obtain record of that last office note  -Currently on Breo Ellipta maintenance inhaler and albuterol inhaler as needed  -Patient has seldom needed Ventolin inhaler    3. Stage 3 chronic kidney disease, unspecified whether stage 3a or 3b CKD ([address])    -Follows with Nephrology  -3/7/2022 BMP reviewed    4. Class 1 drug-induced obesity without serious comorbidity with body mass index (BMI) of 32.0 to 32.9 in adult    -Patient remains active    5. Sinus congestion    -Patient complaining of right maxillary sinus congestion most bothersome at night for the past 2 weeks, no fever and no unilateral nasal discharge, no sinus tenderness on exam today  -Advised patient about oral antihistamine and to start Flonase  - fluticasone (FLONASE) 50 mcg/act nasal spray; 1 spray into each nostril daily  Dispense: 18.2 mL; Refill: 1  -Call if symptoms worsening or not improving    6. Elevated blood pressure reading    -Patient's wife and Fortune just returned from hospital stay, mentions some related stressor  -Return to office in 1 to 2 weeks for blood pressure recheck    Victorina Black acknowledged understanding of treatment plan, all questions answered. Subjective      Victorina Black is a 77 y.o. male who presents for 6-month medical follow-up.   Patient mentions that his wife just returned from hospital stay and endorses related stress. Patient states " physically I feel great". Patient continues to follow with his nephrologist and allergist.  Patient describes right maxillary sinus congestion and postnasal drip which is bothersome at night. He states he applied a hot towel to the area that offered some relief. This has been ongoing for the past 2 weeks. Is also used a nasal rinse he describes. He denies any fever or chills. He denies any nasal discharge. In regards to his asthma, patient states that he seldom uses albuterol inhaler. The following portions of the patient's history were reviewed and updated as appropriate: allergies, current medications, past family history, past medical history, past social history, past surgical history and problem list.     Past Medical History:   Diagnosis Date   • Allergic    • Asthma    • Azotemia    • BPH with obstruction/lower urinary tract symptoms    • Chronic kidney disease     stage 3   • Headache(784.0)    • Hydronephrosis    • Incomplete bladder emptying    • Pneumonia    • Urinary retention        Allergies   Allergen Reactions   • Other      Cats, Ragweed   • Pollen Extract        Past Surgical History:   Procedure Laterality Date   • APPENDECTOMY     • COLONOSCOPY     • CYSTOSCOPY  2014   • HERNIA REPAIR     • KNEE SURGERY  2013    meniscus tear   • [place] TENDON SHEATH INCISION Right 1/19/2021    Procedure: Right long finger trigger finger release;  Surgeon: Eran Anne MD;  Location: [facility];  Service: Orthopedics   • ROOT CANAL     • TRANSURETHRAL RESECTION OF PROSTATE     • US GUIDANCE  5/30/2018       Family History   Family history unknown: Yes   Problem Relation Age of Onset   • Family history unknown:  Yes   • Dementia Father    • Glaucoma Father    • Completed Suicide  Cousin        Social History     Socioeconomic History   • Marital status: /Civil Union     Spouse name: None   • Number of children: None   • Years of education: None   • Highest education level: None   Occupational History   • None   Tobacco Use   • Smoking status: Former     Packs/day: 0.25     Years: 12.00     Total pack years: 3.00     Types: Cigarettes     Quit date: 2011     Years since quittin.6   • Smokeless tobacco: Former   Vaping Use   • Vaping Use: Never used   Substance and Sexual Activity   • Alcohol use: Yes     Alcohol/week: 2.0 standard drinks of alcohol     Types: 2 Glasses of wine per week     Comment: occassional   • Drug use: No   • Sexual activity: None   Other Topics Concern   • None   Social History Narrative   • None     Social Determinants of Health     Financial Resource Strain: Low Risk  (3/3/2023)    Overall Financial Resource Strain (CARDIA)    • Difficulty of Paying Living Expenses: Not hard at all   Food Insecurity: Not on file   Transportation Needs: No Transportation Needs (3/3/2023)    PRAPARE - Transportation    • Lack of Transportation (Medical): No    • Lack of Transportation (Non-Medical): No   Physical Activity: Not on file   Stress: Not on file   Social Connections: Not on file   Intimate Partner Violence: Not on file   Housing Stability: Not on file       Current Outpatient Medications   Medication Sig Dispense Refill   • acetaminophen (TYLENOL) 500 mg tablet Take 1 500mg tablet in the morning prior to surgery, then 1 tablet every 6 hours for 5-7 days. 30 tablet 0   • BREO ELLIPTA 200-25 MCG/INH inhaler       • Cholecalciferol 5000 units capsule Take 2,000 Units by mouth every other day       • fluticasone (FLONASE) 50 mcg/act nasal spray 1 spray into each nostril daily 18.2 mL 1   • latanoprost (XALATAN) 0.005 % ophthalmic solution place 1 drop into both eyes at bedtime     • olopatadine (PATANOL) 0.1 % ophthalmic solution 1 drop       • Ventolin  (90 Base) MCG/ACT inhaler        No current facility-administered medications for this visit.        Review of Systems     As noted in HPI    Objective      /86 (BP Location: Left arm, Patient Position: Sitting, Cuff Size: Adult)   Pulse 88   Temp 98.2 °F (36.8 °C)   Resp 16   Ht 5' 9" (1.753 m)   Wt 101 kg (221 lb 9.6 oz)   SpO2 97%   BMI 32.72 kg/m²     Physical Exam  Vitals reviewed. Constitutional:       General: He is not in acute distress. Appearance: Normal appearance. He is not ill-appearing or toxic-appearing. HENT:      Head: Normocephalic and atraumatic. Right Ear: Tympanic membrane, ear canal and external ear normal.      Left Ear: Tympanic membrane, ear canal and external ear normal.      Nose: No rhinorrhea. Congestion: mild. Mouth/Throat:      Mouth: Mucous membranes are dry. Pharynx: Oropharynx is clear. No oropharyngeal exudate or posterior oropharyngeal erythema. Eyes:      General:         Right eye: No discharge. Left eye: No discharge. Extraocular Movements: Extraocular movements intact. Conjunctiva/sclera: Conjunctivae normal.      Pupils: Pupils are equal, round, and reactive to light. Cardiovascular:      Rate and Rhythm: Normal rate and regular rhythm. Pulses: Normal pulses. Heart sounds: Normal heart sounds. Pulmonary:      Effort: Pulmonary effort is normal.      Breath sounds: Normal breath sounds. Musculoskeletal:      Cervical back: Neck supple. Right lower leg: No edema. Left lower leg: No edema. Lymphadenopathy:      Cervical: No cervical adenopathy. Skin:     General: Skin is warm and dry. Neurological:      Mental Status: He is alert and oriented to person, place, and time. Psychiatric:         Behavior: Behavior normal.         Thought Content: Thought content normal.         Judgment: Judgment normal.             Some portions of this record may have been generated with voice recognition software. There may be translation, syntax, or grammatical errors.  Occasional wrong word or "sound-a-like" substitutions may have occurred due to the inherent limitations of the voice recognition software. Read the chart carefully and recognize, using context, where substations may have occurred. If you have any questions, please contact the dictating provider for clarification or correction, as needed.

## 2023-09-28 ENCOUNTER — TELEPHONE (OUTPATIENT)
Age: 78
End: 2023-09-28

## 2023-09-28 DIAGNOSIS — R09.81 SINUS CONGESTION: ICD-10-CM

## 2023-09-28 DIAGNOSIS — J01.90 ACUTE SINUSITIS, RECURRENCE NOT SPECIFIED, UNSPECIFIED LOCATION: Primary | ICD-10-CM

## 2023-09-28 RX ORDER — AMOXICILLIN AND CLAVULANATE POTASSIUM 875; 125 MG/1; MG/1
1 TABLET, FILM COATED ORAL EVERY 12 HOURS SCHEDULED
Qty: 14 TABLET | Refills: 0 | Status: SHIPPED | OUTPATIENT
Start: 2023-09-28 | End: 2023-10-05

## 2023-09-28 RX ORDER — IPRATROPIUM BROMIDE 21 UG/1
2 SPRAY, METERED NASAL EVERY 12 HOURS
Qty: 30 ML | Refills: 0 | Status: SHIPPED | OUTPATIENT
Start: 2023-09-28

## 2023-09-28 NOTE — TELEPHONE ENCOUNTER
Patient called stating he was seen by Dr Rosana Echavarria on 9/21 and states he is not feeling any better in regards to his sinus issues. He states he is up every night with sinus pain and nothing is helping him. Patient is wondering if Dr. Rosana Echavarria could send something over to his pharmacy.

## 2023-09-28 NOTE — TELEPHONE ENCOUNTER
Augmentin sent to the pharmacy, advised taking concurrently with probiotic. Atrovent nasal spray also sent to pharmacy. Call if any new or worsening symptoms.

## 2023-10-13 ENCOUNTER — OFFICE VISIT (OUTPATIENT)
Dept: FAMILY MEDICINE CLINIC | Facility: MEDICAL CENTER | Age: 78
End: 2023-10-13
Payer: MEDICARE

## 2023-10-13 VITALS
HEART RATE: 98 BPM | SYSTOLIC BLOOD PRESSURE: 134 MMHG | WEIGHT: 223 LBS | OXYGEN SATURATION: 99 % | BODY MASS INDEX: 33.03 KG/M2 | TEMPERATURE: 98.9 F | HEIGHT: 69 IN | DIASTOLIC BLOOD PRESSURE: 84 MMHG | RESPIRATION RATE: 18 BRPM

## 2023-10-13 DIAGNOSIS — Z23 ENCOUNTER FOR IMMUNIZATION: ICD-10-CM

## 2023-10-13 DIAGNOSIS — N18.30 STAGE 3 CHRONIC KIDNEY DISEASE, UNSPECIFIED WHETHER STAGE 3A OR 3B CKD (HCC): ICD-10-CM

## 2023-10-13 DIAGNOSIS — Z01.30 BLOOD PRESSURE CHECK: Primary | ICD-10-CM

## 2023-10-13 DIAGNOSIS — J34.89 PAIN OF MAXILLARY SINUS: ICD-10-CM

## 2023-10-13 PROCEDURE — 90662 IIV NO PRSV INCREASED AG IM: CPT | Performed by: STUDENT IN AN ORGANIZED HEALTH CARE EDUCATION/TRAINING PROGRAM

## 2023-10-13 PROCEDURE — G0008 ADMIN INFLUENZA VIRUS VAC: HCPCS | Performed by: STUDENT IN AN ORGANIZED HEALTH CARE EDUCATION/TRAINING PROGRAM

## 2023-10-13 PROCEDURE — 99214 OFFICE O/P EST MOD 30 MIN: CPT | Performed by: STUDENT IN AN ORGANIZED HEALTH CARE EDUCATION/TRAINING PROGRAM

## 2023-10-13 NOTE — PROGRESS NOTES
[address] - Clinic Note  Wicho Aguillon, 10/13/23     Genna Pierson MRN: [id number] : 1945 Age: 77 y.o. Assessment/Plan     1. Blood pressure check    -Patient returns to office for blood pressure recheck after elevated blood pressure reading at last office visit  -Blood pressure recheck today 134/84    2. Stage 3 chronic kidney disease, unspecified whether stage 3a or 3b CKD ([address])    - Basic metabolic panel; Future    3. Encounter for immunization    -Counseled about influenza vaccine, agreeable to receiving today  - influenza vaccine, high-dose, PF 0.7 mL (FLUZONE HIGH-DOSE)    4. Pain of maxillary sinus    -Persistent sinus pain that awakens him from sleep at times  -Completed Augmentin course  -Advised to resume intranasal steroid spray  -CT sinus wo contrast; Future    [facility] acknowledged understanding of treatment plan, all questions answered. Subjective      Genna Pierson is a 77 y.o. male who presents for blood pressure recheck. Patient again mentions at last office visit he was quite " worked up" in relation to stressors, wife was being set up at [address] [place] [address] home. Today, patient denies any visual disturbance, chest pain, shortness of breath or leg swelling. Patient describes that he does have right maxillary sinus pain, " worse at night", pain sometimes awakens him from sleep. No fever no chills. No nasal discharge.     The following portions of the patient's history were reviewed and updated as appropriate: allergies, current medications, past family history, past medical history, past social history, past surgical history and problem list.     Past Medical History:   Diagnosis Date    Allergic     Asthma     Azotemia     BPH with obstruction/lower urinary tract symptoms     Chronic kidney disease     stage 3    Headache(784.0)     Hydronephrosis     Incomplete bladder emptying     Pneumonia     Urinary retention        Allergies   Allergen Reactions    Other      Cats, Ragweed    Pollen Extract        Past Surgical History:   Procedure Laterality Date    APPENDECTOMY      COLONOSCOPY      CYSTOSCOPY  2014    HERNIA REPAIR      KNEE SURGERY  2013    meniscus tear    DC TENDON SHEATH INCISION Right 2021    Procedure: Right long finger trigger finger release;  Surgeon: Leticia Cook MD;  Location: [facility];  Service: Orthopedics    ROOT CANAL      TRANSURETHRAL RESECTION OF PROSTATE      US GUIDANCE  2018       Family History   Family history unknown: Yes   Problem Relation Age of Onset    Family history unknown: Yes    Dementia Father     Glaucoma Father     Completed Suicide  Cousin        Social History     Socioeconomic History    Marital status: /Civil Union     Spouse name: None    Number of children: None    Years of education: None    Highest education level: None   Occupational History    None   Tobacco Use    Smoking status: Former     Packs/day: 0.25     Years: 12.00     Total pack years: 3.00     Types: Cigarettes     Quit date: 2011     Years since quittin.7    Smokeless tobacco: Former   Vaping Use    Vaping Use: Never used   Substance and Sexual Activity    Alcohol use: Yes     Alcohol/week: 2.0 standard drinks of alcohol     Types: 2 Glasses of wine per week     Comment: occassional    Drug use: No    Sexual activity: None   Other Topics Concern    None   Social History Narrative    None     Social Determinants of Health     Financial Resource Strain: Low Risk  (3/3/2023)    Overall Financial Resource Strain (CARDIA)     Difficulty of Paying Living Expenses: Not hard at all   Food Insecurity: Not on file   Transportation Needs: No Transportation Needs (3/3/2023)    PRAPARE - Transportation     Lack of Transportation (Medical): No     Lack of Transportation (Non-Medical):  No   Physical Activity: Not on file   Stress: Not on file   Social Connections: Not on file   Intimate Partner Violence: Not on file Housing Stability: Not on file       Current Outpatient Medications   Medication Sig Dispense Refill    acetaminophen (TYLENOL) 500 mg tablet Take 1 500mg tablet in the morning prior to surgery, then 1 tablet every 6 hours for 5-7 days. 30 tablet 0    BREO ELLIPTA 200-25 MCG/INH inhaler        Cholecalciferol 5000 units capsule Take 2,000 Units by mouth every other day        fluticasone (FLONASE) 50 mcg/act nasal spray 1 spray into each nostril daily 18.2 mL 1    ipratropium (ATROVENT) 0.03 % nasal spray 2 sprays into each nostril every 12 (twelve) hours 30 mL 0    latanoprost (XALATAN) 0.005 % ophthalmic solution place 1 drop into both eyes at bedtime      olopatadine (PATANOL) 0.1 % ophthalmic solution 1 drop        Ventolin  (90 Base) MCG/ACT inhaler        No current facility-administered medications for this visit. Review of Systems     As noted in HPI    Objective      /84 (BP Location: Left arm, Patient Position: Sitting, Cuff Size: Adult)   Pulse 98   Temp 98.9 °F (37.2 °C)   Resp 18   Ht 5' 9" (1.753 m)   Wt 101 kg (223 lb)   SpO2 99%   BMI 32.93 kg/m²     Physical Exam  Vitals reviewed. Constitutional:       General: He is not in acute distress. Appearance: Normal appearance. He is not ill-appearing or toxic-appearing. HENT:      Head: Normocephalic and atraumatic. Nose:      Comments: No marked sinus tenderness at time of exam  Eyes:      Conjunctiva/sclera: Conjunctivae normal.   Pulmonary:      Effort: Pulmonary effort is normal.   Neurological:      Mental Status: He is alert and oriented to person, place, and time. Psychiatric:         Mood and Affect: Mood normal.         Behavior: Behavior normal.         Thought Content: Thought content normal.         Judgment: Judgment normal.             Some portions of this record may have been generated with voice recognition software. There may be translation, syntax, or grammatical errors.  Occasional wrong word or "sound-a-like" substitutions may have occurred due to the inherent limitations of the voice recognition software. Read the chart carefully and recognize, using context, where substations may have occurred. If you have any questions, please contact the dictating provider for clarification or correction, as needed.

## 2023-10-20 ENCOUNTER — HOSPITAL ENCOUNTER (OUTPATIENT)
Dept: RADIOLOGY | Facility: MEDICAL CENTER | Age: 78
Discharge: HOME | End: 2023-10-20
Payer: MEDICARE

## 2023-10-20 DIAGNOSIS — D16.4 OSTEOMA OF NASAL SINUS: Primary | ICD-10-CM

## 2023-10-20 DIAGNOSIS — J34.89 PAIN OF MAXILLARY SINUS: ICD-10-CM

## 2023-10-20 PROCEDURE — G1004 CDSM NDSC: HCPCS

## 2023-10-20 PROCEDURE — 70486 CT MAXILLOFACIAL W/O DYE: CPT

## 2023-10-30 ENCOUNTER — TELEPHONE (OUTPATIENT)
Age: 78
End: 2023-10-30

## 2023-10-30 NOTE — TELEPHONE ENCOUNTER
Patient requesting a call back from the clinical team regarding results from his CT done on 10/20/23 and if he needs to schedule appointment for any procedure.   He states he was told someone would call him but no has

## 2023-10-30 NOTE — TELEPHONE ENCOUNTER
I spoke with Nicolette Verduzco gave him the number for [facility].

## 2023-11-09 ENCOUNTER — TELEPHONE (OUTPATIENT)
Age: 78
End: 2023-11-09

## 2024-03-19 ENCOUNTER — APPOINTMENT (OUTPATIENT)
Dept: LAB | Facility: MEDICAL CENTER | Age: 79
End: 2024-03-19
Payer: MEDICARE

## 2024-03-19 DIAGNOSIS — N18.30 STAGE 3 CHRONIC KIDNEY DISEASE, UNSPECIFIED WHETHER STAGE 3A OR 3B CKD (HCC): ICD-10-CM

## 2024-03-19 LAB
ANION GAP SERPL CALCULATED.3IONS-SCNC: 10 MMOL/L (ref 4–13)
BUN SERPL-MCNC: 30 MG/DL (ref 5–25)
CALCIUM SERPL-MCNC: 9.6 MG/DL (ref 8.4–10.2)
CHLORIDE SERPL-SCNC: 100 MMOL/L (ref 96–108)
CO2 SERPL-SCNC: 28 MMOL/L (ref 21–32)
CREAT SERPL-MCNC: 1.95 MG/DL (ref 0.6–1.3)
GFR SERPL CREATININE-BSD FRML MDRD: 31 ML/MIN/1.73SQ M
GLUCOSE P FAST SERPL-MCNC: 115 MG/DL (ref 65–99)
POTASSIUM SERPL-SCNC: 4.9 MMOL/L (ref 3.5–5.3)
SODIUM SERPL-SCNC: 138 MMOL/L (ref 135–147)

## 2024-03-19 PROCEDURE — 80048 BASIC METABOLIC PNL TOTAL CA: CPT

## 2024-03-19 PROCEDURE — 36415 COLL VENOUS BLD VENIPUNCTURE: CPT

## 2024-03-22 ENCOUNTER — OFFICE VISIT (OUTPATIENT)
Dept: FAMILY MEDICINE CLINIC | Facility: MEDICAL CENTER | Age: 79
End: 2024-03-22
Payer: MEDICARE

## 2024-03-22 VITALS
SYSTOLIC BLOOD PRESSURE: 126 MMHG | TEMPERATURE: 98.1 F | DIASTOLIC BLOOD PRESSURE: 84 MMHG | HEIGHT: 67 IN | BODY MASS INDEX: 33.93 KG/M2 | HEART RATE: 81 BPM | OXYGEN SATURATION: 98 % | WEIGHT: 216.2 LBS

## 2024-03-22 DIAGNOSIS — Z00.00 MEDICARE ANNUAL WELLNESS VISIT, SUBSEQUENT: Primary | ICD-10-CM

## 2024-03-22 DIAGNOSIS — R73.01 ELEVATED FASTING BLOOD SUGAR: ICD-10-CM

## 2024-03-22 DIAGNOSIS — N18.30 STAGE 3 CHRONIC KIDNEY DISEASE, UNSPECIFIED WHETHER STAGE 3A OR 3B CKD (HCC): ICD-10-CM

## 2024-03-22 LAB — SL AMB POCT HEMOGLOBIN AIC: 5.3 (ref ?–6.5)

## 2024-03-22 PROCEDURE — G0439 PPPS, SUBSEQ VISIT: HCPCS | Performed by: STUDENT IN AN ORGANIZED HEALTH CARE EDUCATION/TRAINING PROGRAM

## 2024-03-22 PROCEDURE — 99213 OFFICE O/P EST LOW 20 MIN: CPT | Performed by: STUDENT IN AN ORGANIZED HEALTH CARE EDUCATION/TRAINING PROGRAM

## 2024-03-22 PROCEDURE — 83036 HEMOGLOBIN GLYCOSYLATED A1C: CPT | Performed by: STUDENT IN AN ORGANIZED HEALTH CARE EDUCATION/TRAINING PROGRAM

## 2024-03-22 NOTE — PATIENT INSTRUCTIONS
Medicare Preventive Visit Patient Instructions  Thank you for completing your Welcome to Medicare Visit or Medicare Annual Wellness Visit today. Your next wellness visit will be due in one year (3/23/2025).  The screening/preventive services that you may require over the next 5-10 years are detailed below. Some tests may not apply to you based off risk factors and/or age. Screening tests ordered at today's visit but not completed yet may show as past due. Also, please note that scanned in results may not display below.  Preventive Screenings:  Service Recommendations Previous Testing/Comments   Colorectal Cancer Screening  Colonoscopy    Fecal Occult Blood Test (FOBT)/Fecal Immunochemical Test (FIT)  Fecal DNA/Cologuard Test  Flexible Sigmoidoscopy Age: 45-75 years old   Colonoscopy: every 10 years (May be performed more frequently if at higher risk)  OR  FOBT/FIT: every 1 year  OR  Cologuard: every 3 years  OR  Sigmoidoscopy: every 5 years  Screening may be recommended earlier than age 45 if at higher risk for colorectal cancer. Also, an individualized decision between you and your healthcare provider will decide whether screening between the ages of 76-85 would be appropriate. Colonoscopy: Not on file  FOBT/FIT: Not on file  Cologuard: Not on file  Sigmoidoscopy: Not on file          Prostate Cancer Screening Individualized decision between patient and health care provider in men between ages of 55-69   Medicare will cover every 12 months beginning on the day after your 50th birthday PSA: No results in last 5 years     Screening Not Indicated     Hepatitis C Screening Once for adults born between 1945 and 1965  More frequently in patients at high risk for Hepatitis C Hep C Antibody: 11/11/2021    Screening Current   Diabetes Screening 1-2 times per year if you're at risk for diabetes or have pre-diabetes Fasting glucose: 115 mg/dL (3/19/2024)  A1C: No results in last 5 years (No results in last 5 years)  Screening  Current   Cholesterol Screening Once every 5 years if you don't have a lipid disorder. May order more often based on risk factors. Lipid panel: 11/11/2021  Screening Current      Other Preventive Screenings Covered by Medicare:  Abdominal Aortic Aneurysm (AAA) Screening: covered once if your at risk. You're considered to be at risk if you have a family history of AAA or a male between the age of 65-75 who smoking at least 100 cigarettes in your lifetime.  Lung Cancer Screening: covers low dose CT scan once per year if you meet all of the following conditions: (1) Age 55-77; (2) No signs or symptoms of lung cancer; (3) Current smoker or have quit smoking within the last 15 years; (4) You have a tobacco smoking history of at least 20 pack years (packs per day x number of years you smoked); (5) You get a written order from a healthcare provider.  Glaucoma Screening: covered annually if you're considered high risk: (1) You have diabetes OR (2) Family history of glaucoma OR (3)  aged 50 and older OR (4)  American aged 65 and older  Osteoporosis Screening: covered every 2 years if you meet one of the following conditions: (1) Have a vertebral abnormality; (2) On glucocorticoid therapy for more than 3 months; (3) Have primary hyperparathyroidism; (4) On osteoporosis medications and need to assess response to drug therapy.  HIV Screening: covered annually if you're between the age of 15-65. Also covered annually if you are younger than 15 and older than 65 with risk factors for HIV infection. For pregnant patients, it is covered up to 3 times per pregnancy.    Immunizations:  Immunization Recommendations   Influenza Vaccine Annual influenza vaccination during flu season is recommended for all persons aged >= 6 months who do not have contraindications   Pneumococcal Vaccine   * Pneumococcal conjugate vaccine = PCV13 (Prevnar 13), PCV15 (Vaxneuvance), PCV20 (Prevnar 20)  * Pneumococcal polysaccharide  vaccine = PPSV23 (Pneumovax) Adults 19-65 yo with certain risk factors or if 65+ yo  If never received any pneumonia vaccine: recommend Prevnar 20 (PCV20)  Give PCV20 if previously received 1 dose of PCV13 or PPSV23   Hepatitis B Vaccine 3 dose series if at intermediate or high risk (ex: diabetes, end stage renal disease, liver disease)   Respiratory syncytial virus (RSV) Vaccine - COVERED BY MEDICARE PART D  * RSVPreF3 (Arexvy) CDC recommends that adults 60 years of age and older may receive a single dose of RSV vaccine using shared clinical decision-making (SCDM)   Tetanus (Td) Vaccine - COST NOT COVERED BY MEDICARE PART B Following completion of primary series, a booster dose should be given every 10 years to maintain immunity against tetanus. Td may also be given as tetanus wound prophylaxis.   Tdap Vaccine - COST NOT COVERED BY MEDICARE PART B Recommended at least once for all adults. For pregnant patients, recommended with each pregnancy.   Shingles Vaccine (Shingrix) - COST NOT COVERED BY MEDICARE PART B  2 shot series recommended in those 19 years and older who have or will have weakened immune systems or those 50 years and older     Health Maintenance Due:      Topic Date Due    Hepatitis C Screening  Completed     Immunizations Due:      Topic Date Due    COVID-19 Vaccine (7 - 2023-24 season) 09/01/2023     Advance Directives   What are advance directives?  Advance directives are legal documents that state your wishes and plans for medical care. These plans are made ahead of time in case you lose your ability to make decisions for yourself. Advance directives can apply to any medical decision, such as the treatments you want, and if you want to donate organs.   What are the types of advance directives?  There are many types of advance directives, and each state has rules about how to use them. You may choose a combination of any of the following:  Living will:  This is a written record of the treatment you  want. You can also choose which treatments you do not want, which to limit, and which to stop at a certain time. This includes surgery, medicine, IV fluid, and tube feedings.   Durable power of  for healthcare (DPAHC):  This is a written record that states who you want to make healthcare choices for you when you are unable to make them for yourself. This person, called a proxy, is usually a family member or a friend. You may choose more than 1 proxy.  Do not resuscitate (DNR) order:  A DNR order is used in case your heart stops beating or you stop breathing. It is a request not to have certain forms of treatment, such as CPR. A DNR order may be included in other types of advance directives.  Medical directive:  This covers the care that you want if you are in a coma, near death, or unable to make decisions for yourself. You can list the treatments you want for each condition. Treatment may include pain medicine, surgery, blood transfusions, dialysis, IV or tube feedings, and a ventilator (breathing machine).  Values history:  This document has questions about your views, beliefs, and how you feel and think about life. This information can help others choose the care that you would choose.  Why are advance directives important?  An advance directive helps you control your care. Although spoken wishes may be used, it is better to have your wishes written down. Spoken wishes can be misunderstood, or not followed. Treatments may be given even if you do not want them. An advance directive may make it easier for your family to make difficult choices about your care.   Weight Management   Why it is important to manage your weight:  Being overweight increases your risk of health conditions such as heart disease, high blood pressure, type 2 diabetes, and certain types of cancer. It can also increase your risk for osteoarthritis, sleep apnea, and other respiratory problems. Aim for a slow, steady weight loss. Even a  small amount of weight loss can lower your risk of health problems.  How to lose weight safely:  A safe and healthy way to lose weight is to eat fewer calories and get regular exercise. You can lose up about 1 pound a week by decreasing the number of calories you eat by 500 calories each day.   Healthy meal plan for weight management:  A healthy meal plan includes a variety of foods, contains fewer calories, and helps you stay healthy. A healthy meal plan includes the following:  Eat whole-grain foods more often.  A healthy meal plan should contain fiber. Fiber is the part of grains, fruits, and vegetables that is not broken down by your body. Whole-grain foods are healthy and provide extra fiber in your diet. Some examples of whole-grain foods are whole-wheat breads and pastas, oatmeal, brown rice, and bulgur.  Eat a variety of vegetables every day.  Include dark, leafy greens such as spinach, kale, too greens, and mustard greens. Eat yellow and orange vegetables such as carrots, sweet potatoes, and winter squash.   Eat a variety of fruits every day.  Choose fresh or canned fruit (canned in its own juice or light syrup) instead of juice. Fruit juice has very little or no fiber.  Eat low-fat dairy foods.  Drink fat-free (skim) milk or 1% milk. Eat fat-free yogurt and low-fat cottage cheese. Try low-fat cheeses such as mozzarella and other reduced-fat cheeses.  Choose meat and other protein foods that are low in fat.  Choose beans or other legumes such as split peas or lentils. Choose fish, skinless poultry (chicken or turkey), or lean cuts of red meat (beef or pork). Before you cook meat or poultry, cut off any visible fat.   Use less fat and oil.  Try baking foods instead of frying them. Add less fat, such as margarine, sour cream, regular salad dressing and mayonnaise to foods. Eat fewer high-fat foods. Some examples of high-fat foods include french fries, doughnuts, ice cream, and cakes.  Eat fewer sweets.   Limit foods and drinks that are high in sugar. This includes candy, cookies, regular soda, and sweetened drinks.  Exercise:  Exercise at least 30 minutes per day on most days of the week. Some examples of exercise include walking, biking, dancing, and swimming. You can also fit in more physical activity by taking the stairs instead of the elevator or parking farther away from stores. Ask your healthcare provider about the best exercise plan for you.      © Copyright [employer] 2018 Information is for End User's use only and may not be sold, redistributed or otherwise used for commercial purposes. All illustrations and images included in CareNotes® are the copyrighted property of [employer] [facility]. or [employer]    DASH Eating Plan   WHAT YOU NEED TO KNOW:   The DASH (Dietary Approaches to Stop Hypertension) Eating Plan is designed to help prevent or lower high blood pressure. It can also help to lower LDL (bad) cholesterol and decrease your risk for heart disease. The plan is low in sodium, sugar, unhealthy fats, and total fat. It is high in potassium, calcium, magnesium, and fiber. These nutrients are added when you eat more fruits, vegetables, and whole grains. With the DASH eating plan, you need to eat a certain number of servings from each food group. This will help you get enough of certain nutrients and limit others. The amount of servings you should eat depends on how many calories you need. Your dietitian can help you create meal plans with the right number of servings for each food group.       DISCHARGE INSTRUCTIONS:   What you need to know about sodium:  Your dietitian will tell you how much sodium is safe for you to have each day. People with high blood pressure should have no more than 1,500 to 2,300 mg of sodium in a day. A teaspoon (tsp) of salt has 2,300 mg of sodium. This may seem like a difficult goal, but small changes to the foods you eat can make a big difference. Your healthcare  provider or dietitian can help you create a meal plan that follows your sodium limit.  Read food labels.  Food labels can help you choose foods that are low in sodium. The amount of sodium is listed in milligrams (mg). The % Daily Value (DV) column tells you how much of your daily needs are met by 1 serving of the food for each nutrient listed. Choose foods that have less than 5% of the DV of sodium. These foods are considered low in sodium. Foods that have 20% or more of the DV of sodium are considered high in sodium. Avoid foods that have more than 300 mg of sodium in each serving. Choose foods that say low-sodium, reduced-sodium, or no salt added on the food label.         Limit added salt.  Do not salt food at the table if you add salt when you cook. Use herbs and spices, such as onions, garlic, and salt-free seasonings to add flavor. Try lemon or lime juice or vinegar to add a tart flavor. Use hot peppers or a small amount of hot pepper sauce to add a spicy flavor. Limit foods high in added salt, such as the following:    Seasonings made with salt, such as garlic salt, celery salt, onion salt, seasoned salt, meat tenderizers, and monosodium glutamate (MSG)    Miso soup and canned or dried soup mixes    Regular soy sauce, barbecue sauce, teriyaki sauce, steak sauce, Worcestershire sauce, and most flavored vinegars    Snack foods, such as salted chips, popcorn, pretzels, pork rinds, salted crackers, and salted nuts    Frozen foods, such as dinners, entrees, vegetables with sauces, and breaded meats    Ask about salt substitutes.  Ask your healthcare provider if you may use salt substitutes. Some salt substitutes have ingredients that can be harmful if you have certain health conditions.    Choose foods carefully at restaurants.  Meals from restaurants, especially fast food restaurants, are often high in sodium. Some restaurants have nutrition information that tells you the amount of sodium in their foods. Ask to  have your food prepared with less, or no salt.    What you need to know about fats:  Healthy fats include unsaturated fats and omega-3 fatty acids. Unhealthy fats include saturated fats and trans fats.  Include healthy fats, such as the following:      Cooking oils, such as soybean, canola, olive, or sunflower    Fatty fish, such as salmon, tuna, mackerel, or sardines    Flaxseed oil or ground flaxseed    ½ cup of cooked beans, such as black beans, kidney beans, or lara beans    1½ ounces of low-sodium nuts, such as almonds or walnuts    Low-sugar, low-sodium peanut butter    Seeds such as arthur seeds or sunflower seeds       Limit or do not have unhealthy fats, such as the following:      Foods that contain fat from animals, such as fatty meats, whole milk, butter, and cream    Shortening, stick margarine, palm oil, and coconut oil    Full-fat or creamy salad dressing    Creamy soup    Crackers, chips, and baked goods made with margarine or shortening    Foods that are fried in unhealthy fats    Gravy and sauces, such as Lior or cheese sauces    What you need to know about carbohydrates (carbs):  All carbs break down into sugar. Complex carbs contain more fiber than simple carbs. This means complex carbs go into the bloodstream more slowly and cause less of a blood sugar spike. Try to include more complex carbs and fewer simple carbs.  Include complex carbs, such as the followin slice of whole-grain bread    1 ounce of dry cereal that does not contain added sugar    ½ cup of cooked oatmeal    2 ounces of cooked whole-grain pasta    ½ cup of cooked brown rice    Limit or do not have simple carbs, such as the following:      Baked goods, such as doughnuts, pastries, and cookies    Mixes for cornbread and biscuits    White rice and pasta mixes, such as boxed macaroni and cheese    Instant and cold cereals that contain sugar    Jelly, jam, and ice cream that contain sugar    Condiments such as  ketchup    Drinks high in sugar, such as soft drinks, lemonade, and fruit juice    What you need to know about vegetables and fruits:  Vegetables and fruits can be fresh, frozen, or canned. If possible, try to choose low-sodium canned options.  Include a variety of vegetables and fruits, such as the followin medium apple, pear, or peach (about ½ cup chopped)    ½ small banana    ½ cup berries, such as blueberries, strawberries, or blackberries    1 cup of raw leafy greens, such as lettuce, spinach, kale, or too greens    ½ cup of frozen or canned (no added salt) vegetables, such as green beans    ½ cup of fresh, frozen, or canned fruit (canned in light syrup or fruit juice)    ½ cup of vegetable or fruit juice    Limit or do not have vegetables and fruits made in the following ways:      Frozen fruit such as cherries that have added sugar    Fruit in cream or butter sauce    Canned vegetables that are high in sodium    Sauerkraut, pickled vegetables, and other foods prepared in brine    Fried vegetables or vegetables in butter or high-fat sauces    What you need to know about protein foods:   Include lean or low-fat protein foods, such as the following:      Poultry (chicken, turkey) with no skin    Fish (especially fatty fish, such as salmon, fresh tuna, or mackerel)    Lean beef and pork (loin, round, extra lean hamburger)    Egg whites and egg substitutes    1 cup of nonfat (skim) or 1% milk    1½ ounces of fat-free or low-fat cheese    6 ounces of nonfat or low-fat yogurt    Limit or do not have high-fat protein foods, such as the following:      Smoked or cured meat, such as corned beef, fuchs, ham, hot dogs, and sausage    Canned beans and canned meats or spreads, such as potted meats, sardines, anchovies, and imitation seafood    Deli or lunch meats, such as bologna, ham, turkey, and roast beef    High-fat meat (T-bone steak, regular hamburger, and ribs)    Whole eggs and egg yolks    Whole milk,  2% milk, and cream    Regular cheese and processed cheese    Other guidelines to follow:   Maintain a healthy weight.  Your risk for heart disease is higher if you have extra weight. Ask your healthcare provider what a healthy weight is for you. Your provider may suggest that you lose weight. You can lose weight by eating fewer calories and foods that have added sugars and fat. The DASH meal plan can help you do this. Decrease calories by eating smaller portions at each meal and fewer snacks. Ask your provider for more information about how to lose weight.    Exercise regularly.  Regular exercise can help you reach or maintain a healthy weight. Regular exercise can also help decrease your blood pressure and improve your cholesterol levels. Get 30 minutes or more of moderate exercise each day of the week. To lose weight, get at least 60 minutes of exercise. Talk to your healthcare provider about the best exercise program for you.         Limit alcohol.  Women should limit alcohol to 1 drink a day. Men should limit alcohol to 2 drinks a day. A drink of alcohol is 12 ounces of beer, 5 ounces of wine, or 1½ ounces of liquor.    For more information:   [facility] [place]  [facility]  [address] [address]  [place] [address]  Phone: [phone]  Web Address: http://www.nhlbi.nih.gov/health/infoctr/index.htm    © Copyright [employer] 2023 Information is for End User's use only and may not be sold, redistributed or otherwise used for commercial purposes.  The above information is an  only. It is not intended as medical advice for individual conditions or treatments. Talk to your doctor, nurse or pharmacist before following any medical regimen to see if it is safe and effective for you.

## 2024-03-22 NOTE — ASSESSMENT & PLAN NOTE
- I have reviewed pertinent labs:  BMP:   Lab Results   Component Value Date    SODIUM 138 03/19/2024    K 4.9 03/19/2024     03/19/2024    CO2 28 03/19/2024    AGAP 10 03/19/2024    BUN 30 (H) 03/19/2024    CREATININE 1.95 (H) 03/19/2024    GLUC 93 12/20/2023    GLUF 115 (H) 03/19/2024    CALCIUM 9.6 03/19/2024    EGFR 31 03/19/2024     -I have reviewed pertinent labs:  Hemoglobin A1C/EST AVG Glucose   Lab Results   Component Value Date    HGBA1C 5.3 03/22/2024

## 2024-03-22 NOTE — ASSESSMENT & PLAN NOTE
Lab Results   Component Value Date    EGFR 31 03/19/2024    EGFR 37 (L) 12/20/2023    EGFR 42 (L) 03/07/2023    CREATININE 1.95 (H) 03/19/2024    CREATININE 1.82 (H) 12/20/2023    CREATININE 1.65 (H) 03/07/2023   - following with Nephrology [facility]

## 2024-03-22 NOTE — PROGRESS NOTES
Assessment and Plan:     Problem List Items Addressed This Visit          Genitourinary    CKD (chronic kidney disease) stage 3, GFR 30-59 ml/min ([facility])     Lab Results   Component Value Date    EGFR 31 03/19/2024    EGFR 37 (L) 12/20/2023    EGFR 42 (L) 03/07/2023    CREATININE 1.95 (H) 03/19/2024    CREATININE 1.82 (H) 12/20/2023    CREATININE 1.65 (H) 03/07/2023   - following with Nephrology [facility]            Other    Medicare annual wellness visit, subsequent - Primary     Counseled about newest COVID-19 vaccine and RSV vaccine, immunizations otherwise up-to-date         Elevated fasting blood sugar     - I have reviewed pertinent labs:  BMP:   Lab Results   Component Value Date    SODIUM 138 03/19/2024    K 4.9 03/19/2024     03/19/2024    CO2 28 03/19/2024    AGAP 10 03/19/2024    BUN 30 (H) 03/19/2024    CREATININE 1.95 (H) 03/19/2024    GLUC 93 12/20/2023    GLUF 115 (H) 03/19/2024    CALCIUM 9.6 03/19/2024    EGFR 31 03/19/2024     -I have reviewed pertinent labs:  Hemoglobin A1C/EST AVG Glucose   Lab Results   Component Value Date    HGBA1C 5.3 03/22/2024                  Relevant Orders    POCT hemoglobin A1c (Completed)        Preventive health issues were discussed with patient, and age appropriate screening tests were ordered as noted in patient's After Visit Summary.  Personalized health advice and appropriate referrals for health education or preventive services given if needed, as noted in patient's After Visit Summary.    Follow-up in 6 months and sooner as needed.   History of Present Illness:     Patient presents for a Medicare Wellness Visit    HPI   Patient Care Team:  Benjamin Rivera DO as PCP - General (Family Medicine)     Review of Systems:     Review of Systems    As noted in HPI      Problem List:     Patient Active Problem List   Diagnosis    BPH with obstruction/lower urinary tract symptoms    Class 1 drug-induced obesity without serious comorbidity with body mass index (BMI) of 32.0  to 32.9 in adult    Epigastric discomfort    Intractable hiccups    CKD (chronic kidney disease) stage 3, GFR 30-59 ml/min ([facility])    Asthma    Medicare annual wellness visit, subsequent    Gastritis without bleeding    Lactose intolerance    Abnormal CT scan of head    Precordial chest pain    Increased pressure in the eye    Right elbow pain    Elevated fasting blood sugar      Past Medical and Surgical History:     Past Medical History:   Diagnosis Date    Allergic     Asthma     Azotemia     BPH with obstruction/lower urinary tract symptoms     Chronic kidney disease     stage 3    Headache(784.0)     Hydronephrosis     Incomplete bladder emptying     Pneumonia     Urinary retention      Past Surgical History:   Procedure Laterality Date    APPENDECTOMY      COLONOSCOPY      CYSTOSCOPY      HERNIA REPAIR      KNEE SURGERY  2013    meniscus tear    [place] TENDON SHEATH INCISION Right 2021    Procedure: Right long finger trigger finger release;  Surgeon: Tommy Mccarthy MD;  Location: [facility];  Service: Orthopedics    ROOT CANAL      TRANSURETHRAL RESECTION OF PROSTATE      US GUIDANCE  2018      Family History:     Family History   Family history unknown: Yes   Problem Relation Age of Onset    Family history unknown: Yes    Dementia Father     Glaucoma Father     Completed Suicide  Cousin       Social History:     Social History     Socioeconomic History    Marital status: /Civil Union     Spouse name: None    Number of children: None    Years of education: None    Highest education level: None   Occupational History    None   Tobacco Use    Smoking status: Former     Current packs/day: 0.00     Average packs/day: 0.3 packs/day for 12.0 years (3.0 ttl pk-yrs)     Types: Cigarettes     Start date: 1999     Quit date: 2011     Years since quittin.1    Smokeless tobacco: Former   Vaping Use    Vaping status: Never Used   Substance and Sexual Activity    Alcohol use: Yes      Alcohol/week: 2.0 standard drinks of alcohol     Types: 2 Glasses of wine per week     Comment: occassional    Drug use: No    Sexual activity: None   Other Topics Concern    None   Social History Narrative    None     Social Determinants of Health     Financial Resource Strain: Low Risk  (3/3/2023)    Overall Financial Resource Strain (CARDIA)     Difficulty of Paying Living Expenses: Not hard at all   Food Insecurity: No Food Insecurity (3/22/2024)    Hunger Vital Sign     Worried About Running Out of Food in the Last Year: Never true     Ran Out of Food in the Last Year: Never true   Transportation Needs: No Transportation Needs (3/22/2024)    PRAPARE - Transportation     Lack of Transportation (Medical): No     Lack of Transportation (Non-Medical): No   Physical Activity: Not on file   Stress: Not on file   Social Connections: Not on file   Intimate Partner Violence: Not on file   Housing Stability: Unknown (3/22/2024)    Housing Stability Vital Sign     Unable to Pay for Housing in the Last Year: No     Number of Places Lived in the Last Year: Not on file     Unstable Housing in the Last Year: No      Medications and Allergies:     Current Outpatient Medications   Medication Sig Dispense Refill    acetaminophen (TYLENOL) 500 mg tablet Take 1 500mg tablet in the morning prior to surgery, then 1 tablet every 6 hours for 5-7 days. 30 tablet 0    Breo Ellipta 100-25 MCG/ACT inhaler inhale 1 puff by mouth and INTO THE LUNGS every morning      BREO ELLIPTA 200-25 MCG/INH inhaler        Cholecalciferol 5000 units capsule Take 2,000 Units by mouth every other day        fluticasone (FLONASE) 50 mcg/act nasal spray 1 spray into each nostril daily 18.2 mL 1    ipratropium (ATROVENT) 0.03 % nasal spray 2 sprays into each nostril every 12 (twelve) hours 30 mL 0    latanoprost (XALATAN) 0.005 % ophthalmic solution place 1 drop into both eyes at bedtime      olopatadine (PATANOL) 0.1 % ophthalmic solution 1 drop         Ventolin  (90 Base) MCG/ACT inhaler        No current facility-administered medications for this visit.     Allergies   Allergen Reactions    Other      Cats, Ragweed    Pollen Extract       Immunizations:     Immunization History   Administered Date(s) Administered    COVID-19 MODERNA VACC 0.5 ML IM 03/26/2021, 04/23/2021, 07/01/2021, 08/01/2021, 11/10/2021, 05/13/2022    INFLUENZA 02/26/2014, 10/23/2017, 08/22/2018, 10/08/2019, 10/05/2021, 09/19/2022    Influenza Split High Dose Preservative Free IM 09/21/2022    Influenza, Seasonal Vaccine, Quadrivalent, Adjuvanted, .5e 10/28/2020    Influenza, high dose seasonal 0.7 mL 10/13/2023    Pneumococcal Conjugate 13-Valent 09/19/2018    Pneumococcal Conjugate PCV 7 03/06/2023    Pneumococcal Conjugate Vaccine 20-valent (Pcv20), Polysace 03/06/2023    Pneumococcal Polysaccharide PPV23 12/10/2019    Tdap 05/31/2022    Zoster 10/02/2015, 08/22/2018, 11/28/2018    Zoster Vaccine Recombinant 08/22/2018, 11/28/2018      Health Maintenance:         Topic Date Due    Hepatitis C Screening  Completed         Topic Date Due    COVID-19 Vaccine (7 - 2023-24 season) 09/01/2023      Medicare Screening Tests and Risk Assessments:     Rick is here for his Subsequent Wellness visit. Last Medicare Wellness visit information reviewed, patient interviewed and updates made to the record today.      Health Risk Assessment:   Patient rates overall health as very good. Patient feels that their physical health rating is same. Patient is very satisfied with their life. Eyesight was rated as same. Hearing was rated as same. Patient feels that their emotional and mental health rating is same. Patients states they are never, rarely angry. Patient states they are never, rarely unusually tired/fatigued. Pain experienced in the last 7 days has been none. Patient states that he has experienced no weight loss or gain in last 6 months.     Depression Screening:   PHQ-2 Score: 0      Fall Risk  Screening:   In the past year, patient has experienced: no history of falling in past year      Home Safety:  Patient does not have trouble with stairs inside or outside of their home. Patient has working smoke alarms and has working carbon monoxide detector. Home safety hazards include: none.     Medications:   Patient is currently taking over-the-counter supplements. OTC medications include: see medication list. Patient is able to manage medications.     Activities of Daily Living (ADLs)/Instrumental Activities of Daily Living (IADLs):   Walk and transfer into and out of bed and chair?: Yes  Dress and groom yourself?: Yes    Bathe or shower yourself?: Yes    Feed yourself? Yes  Do your laundry/housekeeping?: Yes  Manage your money, pay your bills and track your expenses?: Yes  Make your own meals?: Yes    Do your own shopping?: Yes    Previous Hospitalizations:   Any hospitalizations or ED visits within the last 12 months?: No      Advance Care Planning:   Living will: Yes    Durable POA for healthcare: Yes    Advanced directive: Yes      Comments: Daughters POA    Cognitive Screening:   Provider or family/friend/caregiver concerned regarding cognition?: No    PREVENTIVE SCREENINGS      Cardiovascular Screening:    General: Screening Current      Diabetes Screening:     General: Screening Current      Colorectal Cancer Screening:     General: Risks and Benefits Discussed and Patient Declines      Prostate Cancer Screening:    General: Screening Not Indicated      Osteoporosis Screening:    General: Screening Not Indicated      Abdominal Aortic Aneurysm (AAA) Screening:    Risk factors include: tobacco use        General: Screening Not Indicated      Lung Cancer Screening:     General: Screening Not Indicated      Hepatitis C Screening:    General: Screening Current    Screening, Brief Intervention, and Referral to Treatment (SBIRT)    Screening  Typical number of drinks in a day: 0  Typical number of drinks in a  "week: 0  Interpretation: Low risk drinking behavior.    Other Counseling Topics:   Car/seat belt/driving safety and sunscreen.     No results found.     Physical Exam:     /84 (BP Location: Left arm, Patient Position: Sitting, Cuff Size: Large)   Pulse 81   Temp 98.1 °F (36.7 °C) (Temporal)   Ht 5' 7\" (1.702 m)   Wt 98.1 kg (216 lb 3.2 oz)   SpO2 98%   BMI 33.86 kg/m²     Physical Exam  Vitals reviewed.   Constitutional:       General: He is not in acute distress.     Appearance: Normal appearance.   HENT:      Head: Normocephalic and atraumatic.   Eyes:      Conjunctiva/sclera: Conjunctivae normal.   Pulmonary:      Effort: Pulmonary effort is normal.   Neurological:      Mental Status: He is alert and oriented to person, place, and time.   Psychiatric:         Mood and Affect: Mood normal.         Behavior: Behavior normal.         Thought Content: Thought content normal.         Judgment: Judgment normal.          Benjamin Rivera, DO  "

## 2024-04-05 ENCOUNTER — TELEPHONE (OUTPATIENT)
Age: 79
End: 2024-04-05

## 2024-04-05 NOTE — TELEPHONE ENCOUNTER
Patient is going to be new. They saw Dr. Cummins and the note is in epic. He has the lab slips from Dr. Cummins that he is due for in May. Do you want him to use those or would you like to order new ones? Please follow up. Thank you

## 2024-04-05 NOTE — TELEPHONE ENCOUNTER
Called and spoke to the patient. He states that Dr. Cummins advised him to complete labs in May. Informed him that it would give Dr. Erickson some updated information if he went before his appt with Dr. Erickson, but that it was okay if not. Patient states he will complete labs week prior to 5/7 appt.

## 2024-04-24 ENCOUNTER — APPOINTMENT (OUTPATIENT)
Dept: LAB | Facility: MEDICAL CENTER | Age: 79
End: 2024-04-24
Payer: MEDICARE

## 2024-04-24 DIAGNOSIS — E78.2 MIXED HYPERLIPIDEMIA: ICD-10-CM

## 2024-04-24 DIAGNOSIS — N40.0 BENIGN PROSTATIC HYPERPLASIA, UNSPECIFIED WHETHER LOWER URINARY TRACT SYMPTOMS PRESENT: ICD-10-CM

## 2024-04-24 DIAGNOSIS — E55.9 VITAMIN D DEFICIENCY: ICD-10-CM

## 2024-04-24 DIAGNOSIS — I10 ESSENTIAL HYPERTENSION, BENIGN: ICD-10-CM

## 2024-04-24 DIAGNOSIS — N18.32 STAGE 3B CHRONIC KIDNEY DISEASE (HCC): ICD-10-CM

## 2024-04-24 LAB
ALBUMIN SERPL BCP-MCNC: 4.3 G/DL (ref 3.5–5)
ANION GAP SERPL CALCULATED.3IONS-SCNC: 11 MMOL/L (ref 4–13)
BACTERIA UR QL AUTO: ABNORMAL /HPF
BILIRUB UR QL STRIP: NEGATIVE
BUN SERPL-MCNC: 28 MG/DL (ref 5–25)
CALCIUM SERPL-MCNC: 9.5 MG/DL (ref 8.4–10.2)
CHLORIDE SERPL-SCNC: 99 MMOL/L (ref 96–108)
CLARITY UR: CLEAR
CO2 SERPL-SCNC: 26 MMOL/L (ref 21–32)
COLOR UR: ABNORMAL
CREAT SERPL-MCNC: 1.67 MG/DL (ref 0.6–1.3)
CREAT UR-MCNC: 121.7 MG/DL
GFR SERPL CREATININE-BSD FRML MDRD: 38 ML/MIN/1.73SQ M
GLUCOSE P FAST SERPL-MCNC: 99 MG/DL (ref 65–99)
GLUCOSE UR STRIP-MCNC: NEGATIVE MG/DL
GRAN CASTS #/AREA URNS LPF: ABNORMAL /[LPF]
HGB UR QL STRIP.AUTO: NEGATIVE
HYALINE CASTS #/AREA URNS LPF: ABNORMAL /LPF
KETONES UR STRIP-MCNC: NEGATIVE MG/DL
LEUKOCYTE ESTERASE UR QL STRIP: NEGATIVE
NITRITE UR QL STRIP: NEGATIVE
NON-SQ EPI CELLS URNS QL MICRO: ABNORMAL /HPF
PH UR STRIP.AUTO: 6 [PH]
PHOSPHATE SERPL-MCNC: 3.4 MG/DL (ref 2.3–4.1)
POTASSIUM SERPL-SCNC: 4.5 MMOL/L (ref 3.5–5.3)
PROT UR STRIP-MCNC: ABNORMAL MG/DL
PROT UR-MCNC: 21 MG/DL
PROT/CREAT UR: 0.17 MG/G{CREAT} (ref 0–0.1)
RBC #/AREA URNS AUTO: ABNORMAL /HPF
SODIUM SERPL-SCNC: 136 MMOL/L (ref 135–147)
SP GR UR STRIP.AUTO: 1.01 (ref 1–1.03)
UROBILINOGEN UR STRIP-ACNC: <2 MG/DL
WBC #/AREA URNS AUTO: ABNORMAL /HPF

## 2024-04-24 PROCEDURE — 82570 ASSAY OF URINE CREATININE: CPT

## 2024-04-24 PROCEDURE — 36415 COLL VENOUS BLD VENIPUNCTURE: CPT

## 2024-04-24 PROCEDURE — 80069 RENAL FUNCTION PANEL: CPT

## 2024-04-24 PROCEDURE — 84156 ASSAY OF PROTEIN URINE: CPT

## 2024-04-24 PROCEDURE — 81001 URINALYSIS AUTO W/SCOPE: CPT

## 2024-05-01 PROBLEM — Z00.00 MEDICARE ANNUAL WELLNESS VISIT, SUBSEQUENT: Status: RESOLVED | Noted: 2021-11-05 | Resolved: 2024-05-01

## 2024-05-07 ENCOUNTER — OFFICE VISIT (OUTPATIENT)
Dept: NEPHROLOGY | Facility: CLINIC | Age: 79
End: 2024-05-07
Payer: MEDICARE

## 2024-05-07 VITALS
WEIGHT: 220 LBS | SYSTOLIC BLOOD PRESSURE: 137 MMHG | DIASTOLIC BLOOD PRESSURE: 92 MMHG | BODY MASS INDEX: 34.53 KG/M2 | HEIGHT: 67 IN | OXYGEN SATURATION: 98 % | HEART RATE: 73 BPM

## 2024-05-07 DIAGNOSIS — Z86.39 HISTORY OF HYPERKALEMIA: ICD-10-CM

## 2024-05-07 DIAGNOSIS — N18.32 STAGE 3B CHRONIC KIDNEY DISEASE (HCC): Primary | ICD-10-CM

## 2024-05-07 DIAGNOSIS — R03.0 ELEVATED BLOOD PRESSURE READING WITHOUT DIAGNOSIS OF HYPERTENSION: ICD-10-CM

## 2024-05-07 PROCEDURE — 99203 OFFICE O/P NEW LOW 30 MIN: CPT | Performed by: INTERNAL MEDICINE

## 2024-05-07 NOTE — PATIENT INSTRUCTIONS
Your kidney numbers are currently at stage 3 kidney disease.    Blood pressure is currently elevated in the office and we will perform 24-hour ambulatory blood pressure monitoring to rule out hypertension.    Continue to follow a low potassium diet due to previous history of high potassium.    Continue to monitor your blood pressure at home.    We will repeat lab work in 6 months before next office visit.

## 2024-05-07 NOTE — PROGRESS NOTES
NEPHROLOGY OUTPATIENT CONSULTATION   Rick Burks 79 y.o. male MRN: [id number]  Date: 05/07/24  Reason for consultation: Chronic kidney disease    ASSESSMENT and PLAN:  79-year-old male was seen in nephrology office today for evaluation and management of chronic kidney disease.    # Chronic Kidney Disease Stage III-B  - Previous nephrologist: Dr. Sotero Cummins at [facility]  - Etiology/risk factors: Etiology thought to be due to obstructive uropathy (moderate to severe left renal atrophy seen on imaging), age-related nephron loss  - Baseline Cr: 1.5-1.95, most recently 1.67 04/2024  - Urinalysis: 1+ protein, 1-2 WBC, no RBC 04/2024  - Proteinuria: UPCR 170 mg 04/2024, historically around 200 mg since 2021  - Imaging: Moderate to severe left renal atrophy, no obstructive uropathy on CT abdomen 10/2020  - KFRE ~ 10 % risk of kidney failure needing dialysis in next 5 years  - Discussed risk factor reduction to slow progression of chronic kidney disease  Intensive blood pressure control as below   Lifestyle modifications (weight loss/exercise)  Avoidance of NSAIDs    # High blood pressure without diagnosis of hypertension   - Goal BP <120/80 per KDIGO guidelines   - Volume status: Euvolemic  - Status: Blood pressure currently elevated in the office  - Home -130/70-80  - Current antihypertensive regimen: None  - Changes: None  - We will perform 24-hour ambulatory blood pressure monitor and if diagnosis of hypertension confirmed, will start with amlodipine    # Screening for Anemia   - Target Hb: More than 10 g/dL  - Most recent hemoglobin: 16.5 g/dL    # Electrolytes/Acid Base status   >>History of hyperkalemia  - Most recent serum potassium 4.5 04/24/2024  - Continue low potassium diet    # CKD Mineral and Bone Disorder   - Goal Ca 8.5-10 mg/dL, goal Phos 2.7-4.6 mg/dL, goal iPTH 30-70 pg/mL  - PTH 57.1 12/2023, 25-hydroxy vitamin D level 37 03/2023    HISTORY OF PRESENT ILLNESS:  Requesting Physician: Benjamin Rivera,  DO Rick Burks is a 79 y.o. male who has history of obstructive uropathy due to BPH status post laser procedure with urology.  He denies history of hypertension.  He does not have diabetes.  There is no family history of kidney disease.  There is no history of prolonged NSAID use.  He is currently feeling well.  He denies dyspnea.  He denies leg swelling.  He denies any trouble with urination.    >> Major risk factors for CKD  - Diabetes: No  - Hypertension: No  - Age ? 55 years: Yes  - Family history of kidney disease: No   - Obesity or metabolic syndrome: Yes     >> Medical history evaluation   - Prior kidney disease or dialysis: CKD  - Incidental hematuria in the past: No  - Urinary symptoms: No  - History of foamy or frothy urine: No  - History of nephrolithiasis: No  - Diseases that share risk factors with CKD: None  - Systemic diseases that might affect kidney: No  - History of use of medications that might affect renal function: No    PAST MEDICAL HISTORY:  Past Medical History:   Diagnosis Date    Allergic     Asthma     Azotemia     BPH with obstruction/lower urinary tract symptoms     Chronic kidney disease     stage 3    Headache(784.0)     Hydronephrosis     Incomplete bladder emptying     Pneumonia     Urinary retention        PAST SURGICAL HISTORY:  Past Surgical History:   Procedure Laterality Date    APPENDECTOMY      COLONOSCOPY      CYSTOSCOPY  2014    HERNIA REPAIR      KNEE SURGERY  2013    meniscus tear    [place] TENDON SHEATH INCISION Right 1/19/2021    Procedure: Right long finger trigger finger release;  Surgeon: Tommy Mccarthy MD;  Location: [facility];  Service: Orthopedics    ROOT CANAL      TRANSURETHRAL RESECTION OF PROSTATE      US GUIDANCE  5/30/2018       ALLERGIES:  Allergies   Allergen Reactions    Other      Cats, Ragweed    Pollen Extract        SOCIAL HISTORY:  Social History     Substance and Sexual Activity   Alcohol Use Yes    Alcohol/week: 2.0 standard drinks of alcohol     Types: 2 Glasses of wine per week    Comment: occassional     Social History     Substance and Sexual Activity   Drug Use No     Social History     Tobacco Use   Smoking Status Former    Current packs/day: 0.00    Average packs/day: 0.3 packs/day for 12.0 years (3.0 ttl pk-yrs)    Types: Cigarettes    Start date: 1999    Quit date: 2011    Years since quittin.2   Smokeless Tobacco Former       FAMILY HISTORY:  Family History   Family history unknown: Yes   Problem Relation Age of Onset    Family history unknown: Yes    Dementia Father     Glaucoma Father     Completed Suicide  Cousin        MEDICATIONS:    Current Outpatient Medications:     Breo Ellipta 100-25 MCG/ACT inhaler, inhale 1 puff by mouth and INTO THE LUNGS every morning, Disp: , Rfl:     Cholecalciferol 5000 units capsule, Take 1,000 Units by mouth daily, Disp: , Rfl:     fluticasone (FLONASE) 50 mcg/act nasal spray, 1 spray into each nostril daily, Disp: 18.2 mL, Rfl: 1    ipratropium (ATROVENT) 0.03 % nasal spray, 2 sprays into each nostril every 12 (twelve) hours, Disp: 30 mL, Rfl: 0    latanoprost (XALATAN) 0.005 % ophthalmic solution, place 1 drop into both eyes at bedtime, Disp: , Rfl:     olopatadine (PATANOL) 0.1 % ophthalmic solution, 1 drop if needed, Disp: , Rfl:     Ventolin  (90 Base) MCG/ACT inhaler, if needed, Disp: , Rfl:     acetaminophen (TYLENOL) 500 mg tablet, Take 1 500mg tablet in the morning prior to surgery, then 1 tablet every 6 hours for 5-7 days., Disp: 30 tablet, Rfl: 0    BREO ELLIPTA 200-25 MCG/INH inhaler,  , Disp: , Rfl:     REVIEW OF SYSTEMS:  Review of Systems   Constitutional:  Negative for chills and fever.   HENT:  Negative for ear pain and sore throat.    Eyes:  Negative for pain and visual disturbance.   Respiratory:  Negative for cough and shortness of breath.    Cardiovascular:  Negative for chest pain and palpitations.   Gastrointestinal:  Negative for abdominal pain and vomiting.  "  Genitourinary:  Negative for dysuria and hematuria.   Musculoskeletal:  Negative for arthralgias and back pain.   Skin:  Negative for color change and rash.   Neurological:  Negative for seizures and syncope.   All other systems reviewed and are negative.    All the systems were reviewed and were negative except as documented on the HPI.    PHYSICAL EXAMINATION:  /92 (BP Location: Left arm, Patient Position: Sitting, Cuff Size: Standard)   Pulse 73   Ht 5' 7\" (1.702 m)   Wt 99.8 kg (220 lb)   SpO2 98%   BMI 34.46 kg/m²   Current Weight: Weight - Scale: 99.8 kg (220 lb) Body mass index is 34.46 kg/m².  Physical Exam  Constitutional:       Appearance: Normal appearance.   HENT:      Head: Normocephalic and atraumatic.   Cardiovascular:      Rate and Rhythm: Normal rate and regular rhythm.      Pulses: Normal pulses.      Heart sounds: Normal heart sounds.   Pulmonary:      Effort: Pulmonary effort is normal.      Breath sounds: Normal breath sounds.   Abdominal:      Palpations: Abdomen is soft.   Musculoskeletal:         General: Normal range of motion.      Right lower leg: No edema.      Left lower leg: No edema.   Skin:     General: Skin is warm.   Neurological:      Mental Status: He is alert and oriented to person, place, and time. Mental status is at baseline.   Psychiatric:         Mood and Affect: Mood normal.       LABORATORY RESULTS:  Lab Results   Component Value Date    K 4.5 04/24/2024    CL 99 04/24/2024    CO2 26 04/24/2024    BUN 28 (H) 04/24/2024    CREATININE 1.67 (H) 04/24/2024    GLUF 99 04/24/2024    CALCIUM 9.5 04/24/2024    CORRECTEDCA 8.9 10/27/2021    AST 26 12/23/2021    ALT 29 12/23/2021    ALKPHOS 79 12/23/2021    EGFR 38 04/24/2024     Lab Results   Component Value Date    WBC 7.11 04/24/2024    HGB 16.5 04/24/2024    HCT 49.8 (H) 04/24/2024    MCV 93 04/24/2024     04/24/2024     Lab Results   Component Value Date    CALCIUM 9.5 04/24/2024    PHOS 3.4 04/24/2024     "

## 2024-05-13 ENCOUNTER — CLINICAL SUPPORT (OUTPATIENT)
Dept: NEPHROLOGY | Facility: CLINIC | Age: 79
End: 2024-05-13

## 2024-05-13 VITALS
SYSTOLIC BLOOD PRESSURE: 131 MMHG | DIASTOLIC BLOOD PRESSURE: 99 MMHG | HEIGHT: 67 IN | WEIGHT: 220.2 LBS | HEART RATE: 78 BPM | BODY MASS INDEX: 34.56 KG/M2

## 2024-05-13 DIAGNOSIS — I10 WHITE COAT SYNDROME WITH HYPERTENSION: Primary | ICD-10-CM

## 2024-05-13 PROCEDURE — PBNCHG PB NO CHARGE PLACEHOLDER

## 2024-05-13 NOTE — PATIENT INSTRUCTIONS
Patient briefed on responsibility form for safe keeping of ABPM machine and equipment.  Patient signed responsibility form.  Patient briefed on instructions for ABPM use and BP log use and documentation.  Patient verbally acknowledged understanding of all instructions.

## 2024-05-14 ENCOUNTER — CLINICAL SUPPORT (OUTPATIENT)
Dept: NEPHROLOGY | Facility: CLINIC | Age: 79
End: 2024-05-14
Payer: MEDICARE

## 2024-05-14 VITALS — HEIGHT: 67 IN | WEIGHT: 220.24 LBS | BODY MASS INDEX: 34.57 KG/M2

## 2024-05-14 DIAGNOSIS — I10 WHITE COAT SYNDROME WITH HYPERTENSION: Primary | ICD-10-CM

## 2024-05-14 PROCEDURE — 93784 AMBL BP MNTR W/SOFTWARE: CPT | Performed by: INTERNAL MEDICINE

## 2024-05-14 NOTE — PATIENT INSTRUCTIONS
24 hr ABPM returned in working condition with all equipment.  Patient had no additional questions or concerns.

## 2024-05-15 ENCOUNTER — DOCUMENTATION (OUTPATIENT)
Dept: OTHER | Facility: HOSPITAL | Age: 79
End: 2024-05-15

## 2024-05-15 NOTE — PROGRESS NOTES
I have had the pleasure of interpreting a 24 hour ambulatory blood pressure monitor report performed on Rick Burks from 05/13/2024 to 05/14/2024. There were 77% successful readings obtained during that time.    Of note, the patient had no obvious symptoms listed on his diary.    The results were as follows:    This was an optimal study due to adequate time of monitoring.    Rick Burks’s average blood pressure reading was 137/85 with a heart rate of 72. The range was a minimum of 112/92 mm Hg and a maximum of 173/86 mm Hg.    The Daytime average blood pressure reading was 138/87 mm Hg with a heart rate of 76, with 51% of the systolic readings greater than 135 mm Hg and 57% of the diastolic readings greater than 85 mm Hg. There was a significant systolic daytime blood pressure load and significant diastolic daytime blood pressure load. (>20% is significant)    The Nighttime average blood pressure reading was 136/81 with a heart rate of 67, with 93% of the systolic readings greater than 120 mm Hg and 93% of the diastolic readings greater than 70 mm Hg. There was significant systolic night time blood pressure load, significant diastolic night time blood pressure load. (>20% is significant)     The Mean Arterial Blood Pressure (MABP) nocturnal dipping was 0.74%.    Impression:    Rick Bruks’s 24 hour ambulatory blood pressure monitor average reading was 137/85 mm Hg, with a daytime average blood pressure reading of 138/87 mm Hg and a night time average blood pressure reading of 136/81 mm Hg. The MABP nocturnal dipping was 0.74%    Plan  Currently on no antihypertensives  Will start with amlodipine 5 mg daily  Would recommend sleep study with PCP

## 2024-05-16 ENCOUNTER — TELEPHONE (OUTPATIENT)
Dept: NEPHROLOGY | Facility: CLINIC | Age: 79
End: 2024-05-16

## 2024-05-16 NOTE — TELEPHONE ENCOUNTER
----- Message from Tony Erickson MD sent at 5/15/2024  2:24 PM EDT -----  Regarding: Results of 24-hour ambulatory blood pressure monitor  Please let the patient know the results of his 24-hour ambulatory blood pressure monitor is as under    Rick Burks's 24 hour ambulatory blood pressure monitor average reading was 137/85 mm Hg, with a daytime average blood pressure reading of 138/87 mm Hg and a night time average blood pressure reading of 136/81 mm Hg.    Blood pressure did not drop as it showed when he was sleeping.    1.  Start amlodipine 5 mg daily for better control of blood pressure (this is a well-tolerated medication, watch out for palpitations or leg swelling)  2.  Follow-up with PCP and would strongly suggest getting sleep study to rule out sleep apnea  3.  Keep monitoring blood pressure at home and send a log after starting new medication  4.  Please let me know if patient is agreeable and I will send a prescription to his pharmacy

## 2024-05-20 NOTE — TELEPHONE ENCOUNTER
Discussed with the patient on the phone.  Home blood pressure numbers are looking good with examples 120/71, 119/69.  We both agreed to hold off on starting antihypertensive medication.  We both agreed to proceed with sleep study to rule out sleep apnea as blood pressure did not drop to her liking when he was sleeping.  Patient agreed to discuss this with his PCP to get a sleep study done.  Patient was advised to maintain a log and call with blood pressure readings in 2 weeks.

## 2024-05-20 NOTE — TELEPHONE ENCOUNTER
Patient was calling and updated on his 24-hour ambulatory blood pressure monitor. Patient wants to discuss the medication recommendation with Dr. Erickson. He has some questions and concerns.

## 2024-05-22 ENCOUNTER — TELEPHONE (OUTPATIENT)
Age: 79
End: 2024-05-22

## 2024-05-22 RX ORDER — AMLODIPINE BESYLATE 5 MG/1
5 TABLET ORAL DAILY
COMMUNITY
End: 2024-05-22

## 2024-05-22 NOTE — TELEPHONE ENCOUNTER
Called patient in follow-up, he does prefer an appointment to further discuss which I am certainly agreeable with.

## 2024-05-22 NOTE — TELEPHONE ENCOUNTER
Rick called in to discuss with Dr Castellanos some up coming sleep aponia test that he was having and some things that he just went over with his Kidney DrJasiel Please advise he can be reach via [employer] but preferred a call back #[phone]

## 2024-05-23 ENCOUNTER — OFFICE VISIT (OUTPATIENT)
Dept: FAMILY MEDICINE CLINIC | Facility: MEDICAL CENTER | Age: 79
End: 2024-05-23
Payer: MEDICARE

## 2024-05-23 ENCOUNTER — TELEPHONE (OUTPATIENT)
Dept: FAMILY MEDICINE CLINIC | Facility: MEDICAL CENTER | Age: 79
End: 2024-05-23

## 2024-05-23 VITALS
DIASTOLIC BLOOD PRESSURE: 90 MMHG | TEMPERATURE: 98.1 F | RESPIRATION RATE: 18 BRPM | SYSTOLIC BLOOD PRESSURE: 150 MMHG | HEART RATE: 85 BPM

## 2024-05-23 DIAGNOSIS — E66.1 CLASS 1 DRUG-INDUCED OBESITY WITHOUT SERIOUS COMORBIDITY WITH BODY MASS INDEX (BMI) OF 34.0 TO 34.9 IN ADULT: ICD-10-CM

## 2024-05-23 DIAGNOSIS — I10 PRIMARY HYPERTENSION: Primary | ICD-10-CM

## 2024-05-23 DIAGNOSIS — F43.9 STRESS AT HOME: ICD-10-CM

## 2024-05-23 PROCEDURE — 99214 OFFICE O/P EST MOD 30 MIN: CPT | Performed by: STUDENT IN AN ORGANIZED HEALTH CARE EDUCATION/TRAINING PROGRAM

## 2024-05-23 PROCEDURE — G2211 COMPLEX E/M VISIT ADD ON: HCPCS | Performed by: STUDENT IN AN ORGANIZED HEALTH CARE EDUCATION/TRAINING PROGRAM

## 2024-05-23 RX ORDER — AMLODIPINE BESYLATE 2.5 MG/1
2.5 TABLET ORAL DAILY
Qty: 30 TABLET | Refills: 0 | Status: SHIPPED | OUTPATIENT
Start: 2024-05-23

## 2024-05-23 NOTE — TELEPHONE ENCOUNTER
----- Message from Benjamin Rivera DO sent at 5/23/2024  2:23 PM EDT -----  Please schedule with me in 2 to 4 weeks for hypertension new med follow-up

## 2024-05-23 NOTE — PROGRESS NOTES
[facility] - Clinic Note  Benjamin Rivera DO, 24     Rick Burks MRN: [id number] : 1945 Age: 79 y.o.     Assessment/Plan     1. Primary hypertension    -Blood pressure confirmed today upon repeat  -Reviewed results of patient's 24-hour blood pressure monitoring  -Discussed with patient I do agree with starting amlodipine, discussed this medication class and potential side effects, patient also agreeable to proceeding with medication  -Will start amlodipine at 2.5 mg p.o. daily  - Ambulatory Referral to Sleep Medicine; Future  - amLODIPine (NORVASC) 2.5 mg tablet; Take 1 tablet (2.5 mg total) by mouth daily  Dispense: 30 tablet; Refill: 0  -Return to office in 2 to 4 weeks for medication monitoring    2. Class 1 drug-induced obesity without serious comorbidity with body mass index (BMI) of 34.0 to 34.9 in adult    -Encourage patient to continue efforts towards well-balanced nutrition  -Educational material distributed about DASH diet    3. Stress at home    -Informed patient how increased stress, sympathetic nervous response can lead to elevated blood pressures  -Offered support from our , patient states he does have resources through his wife's PCP  -Continue to monitor mood, consider pharmacotherapy if needed for anxiety      Depression Screening and Follow-up Plan: Patient was screened for depression during today's encounter. They screened negative with a PHQ-2 score of 0.      Rick Burks acknowledged understanding of treatment plan, all questions answered.    Subjective      Rick Burks is a 79 y.o. male who presents to discuss recommendations from his nephrologist.  Patient recently had 24-hour blood pressure monitoring completed.  Patient mentions his nephrologist also recommended completing a sleep study.  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  He does endorse some related stress.  Patient states he is trying to make  healthier choices, as he has been busy caring for his wife sometimes he opts for takeout food.    The following portions of the patient's history were reviewed and updated as appropriate: allergies, current medications, past family history, past medical history, past social history, past surgical history and problem list.     Past Medical History:   Diagnosis Date   • Allergic    • Asthma    • Azotemia    • BPH with obstruction/lower urinary tract symptoms    • Chronic kidney disease     stage 3   • Headache(784.0)    • Hydronephrosis    • Incomplete bladder emptying    • Pneumonia    • Urinary retention        Allergies   Allergen Reactions   • Other      Cats, Ragweed   • Pollen Extract        Past Surgical History:   Procedure Laterality Date   • APPENDECTOMY     • COLONOSCOPY     • CYSTOSCOPY     • HERNIA REPAIR     • KNEE SURGERY      meniscus tear   • HI TENDON SHEATH INCISION Right 2021    Procedure: Right long finger trigger finger release;  Surgeon: Tommy Mccarthy MD;  Location: [facility] OR;  Service: Orthopedics   • ROOT CANAL     • TRANSURETHRAL RESECTION OF PROSTATE     • US GUIDANCE  2018       Family History   Family history unknown: Yes   Problem Relation Age of Onset   • Family history unknown: Yes   • Dementia Father    • Glaucoma Father    • Completed Suicide  Cousin        Social History     Socioeconomic History   • Marital status: /Civil Union     Spouse name: None   • Number of children: None   • Years of education: None   • Highest education level: None   Occupational History   • None   Tobacco Use   • Smoking status: Former     Current packs/day: 0.00     Average packs/day: 0.3 packs/day for 12.0 years (3.0 ttl pk-yrs)     Types: Cigarettes     Start date: 1999     Quit date: 2011     Years since quittin.3   • Smokeless tobacco: Former   Vaping Use   • Vaping status: Never Used   Substance and Sexual Activity   • Alcohol use: Yes     Alcohol/week: 2.0  standard drinks of alcohol     Types: 2 Glasses of wine per week     Comment: occassional   • Drug use: No   • Sexual activity: None   Other Topics Concern   • None   Social History Narrative   • None     Social Determinants of Health     Financial Resource Strain: Low Risk  (3/3/2023)    Overall Financial Resource Strain (CARDIA)    • Difficulty of Paying Living Expenses: Not hard at all   Food Insecurity: No Food Insecurity (3/22/2024)    Hunger Vital Sign    • Worried About Running Out of Food in the Last Year: Never true    • Ran Out of Food in the Last Year: Never true   Transportation Needs: No Transportation Needs (3/22/2024)    PRAPARE - Transportation    • Lack of Transportation (Medical): No    • Lack of Transportation (Non-Medical): No   Physical Activity: Not on file   Stress: Not on file   Social Connections: Not on file   Intimate Partner Violence: Not on file   Housing Stability: Unknown (3/22/2024)    Housing Stability Vital Sign    • Unable to Pay for Housing in the Last Year: No    • Number of Times Moved in the Last Year: Not on file    • Homeless in the Last Year: Not on file       Current Outpatient Medications   Medication Sig Dispense Refill   • amLODIPine (NORVASC) 2.5 mg tablet Take 1 tablet (2.5 mg total) by mouth daily 30 tablet 0   • acetaminophen (TYLENOL) 500 mg tablet Take 1 500mg tablet in the morning prior to surgery, then 1 tablet every 6 hours for 5-7 days. 30 tablet 0   • Breo Ellipta 100-25 MCG/ACT inhaler inhale 1 puff by mouth and INTO THE LUNGS every morning     • BREO ELLIPTA 200-25 MCG/INH inhaler       • Cholecalciferol 5000 units capsule Take 1,000 Units by mouth daily     • fluticasone (FLONASE) 50 mcg/act nasal spray 1 spray into each nostril daily 18.2 mL 1   • ipratropium (ATROVENT) 0.03 % nasal spray 2 sprays into each nostril every 12 (twelve) hours 30 mL 0   • latanoprost (XALATAN) 0.005 % ophthalmic solution place 1 drop into both eyes at bedtime     • olopatadine  "(PATANOL) 0.1 % ophthalmic solution 1 drop if needed     • Ventolin  (90 Base) MCG/ACT inhaler if needed       No current facility-administered medications for this visit.       Review of Systems     As noted in HPI    Objective      /90   Pulse 85   Temp 98.1 °F (36.7 °C)   Resp 18     Physical Exam  Constitutional:       General: He is not in acute distress.     Appearance: Normal appearance.   HENT:      Head: Normocephalic and atraumatic.   Eyes:      Conjunctiva/sclera: Conjunctivae normal.   Pulmonary:      Effort: Pulmonary effort is normal. No respiratory distress.   Neurological:      Mental Status: He is alert and oriented to person, place, and time.   Psychiatric:         Mood and Affect: Mood normal.         Behavior: Behavior normal.         Thought Content: Thought content normal.           Some portions of this record may have been generated with voice recognition software. There may be translation, syntax, or grammatical errors. Occasional wrong word or \"sound-a-like\" substitutions may have occurred due to the inherent limitations of the voice recognition software. Read the chart carefully and recognize, using context, where substations may have occurred. If you have any questions, please contact the dictating provider for clarification or correction, as needed.  "

## 2024-06-07 ENCOUNTER — TELEPHONE (OUTPATIENT)
Dept: NEPHROLOGY | Facility: CLINIC | Age: 79
End: 2024-06-07

## 2024-06-07 NOTE — TELEPHONE ENCOUNTER
Attached is patient's BP log from 5/20-6/3  AM average: 129/83 Pulse 76  PM Average 125/73 Pulse 62

## 2024-06-11 NOTE — TELEPHONE ENCOUNTER
Patient was updated on Dr. Erickson's message. Patient is already on 2.5mg of amlodipine prescribed by his PCP. Patient is not sure if you can coordinate with his PCP to come up with a plan for the blood pressure. He also wanted to know if the doctor wanted him to go for the sleep apnea study? Please contact patient back with an update.

## 2024-06-11 NOTE — TELEPHONE ENCOUNTER
Called patient, he stated he is driving and he will call back later. If patient does not call back I will call again.

## 2024-06-11 NOTE — TELEPHONE ENCOUNTER
Discussed with the patient.  He will continue amlodipine 2.5 mg for now and will give us a log in 2 more weeks and we will discuss with his PCP regarding further adjustment.  Can certainly increase amlodipine to 5 mg daily if blood pressure remains above goal.  No changes for now.  Discussed importance of getting sleep study due to not dipping on 24-hour ambulatory blood pressure monitor, patient already scheduled to see sleep physician.

## 2024-06-24 DIAGNOSIS — I10 PRIMARY HYPERTENSION: ICD-10-CM

## 2024-06-25 RX ORDER — AMLODIPINE BESYLATE 2.5 MG/1
2.5 TABLET ORAL DAILY
Qty: 90 TABLET | Refills: 0 | Status: SHIPPED | OUTPATIENT
Start: 2024-06-25

## 2024-06-27 ENCOUNTER — RA CDI HCC (OUTPATIENT)
Dept: OTHER | Facility: HOSPITAL | Age: 79
End: 2024-06-27

## 2024-07-01 ENCOUNTER — TELEPHONE (OUTPATIENT)
Dept: NEPHROLOGY | Facility: CLINIC | Age: 79
End: 2024-07-01

## 2024-07-02 NOTE — TELEPHONE ENCOUNTER
Called and spoke to the patient to relay Dr. Erickson's message above. Patient expressed understanding and asks if Dr. Erickson still feels the testing for sleep apnea is necessary.

## 2024-07-05 ENCOUNTER — OFFICE VISIT (OUTPATIENT)
Dept: FAMILY MEDICINE CLINIC | Facility: MEDICAL CENTER | Age: 79
End: 2024-07-05
Payer: MEDICARE

## 2024-07-05 VITALS
OXYGEN SATURATION: 94 % | WEIGHT: 217.8 LBS | TEMPERATURE: 97.4 F | HEART RATE: 75 BPM | DIASTOLIC BLOOD PRESSURE: 80 MMHG | RESPIRATION RATE: 18 BRPM | SYSTOLIC BLOOD PRESSURE: 130 MMHG | BODY MASS INDEX: 34.11 KG/M2

## 2024-07-05 DIAGNOSIS — I10 PRIMARY HYPERTENSION: Primary | ICD-10-CM

## 2024-07-05 PROCEDURE — 99213 OFFICE O/P EST LOW 20 MIN: CPT | Performed by: STUDENT IN AN ORGANIZED HEALTH CARE EDUCATION/TRAINING PROGRAM

## 2024-07-05 PROCEDURE — G2211 COMPLEX E/M VISIT ADD ON: HCPCS | Performed by: STUDENT IN AN ORGANIZED HEALTH CARE EDUCATION/TRAINING PROGRAM

## 2024-07-05 NOTE — PROGRESS NOTES
Pending sale to [facility] - Clinic Note  Benjamin Rivera DO, 24     Rick Burks MRN: [id number] : 1945 Age: 79 y.o.     Assessment/Plan     1. Primary hypertension    -Well-controlled with amlodipine 2.5 mg p.o. daily, continue  -Patient tolerating new medication well  -Appointment with sleep medicine this month        Rick Burks acknowledged understanding of treatment plan, all questions answered.  Has follow-up visit scheduled in 2024, keep that appointment and follow-up sooner as needed.    Subjective      Rick Burks is a 79 y.o. male who presents for new medication monitoring encounter.  He has diligently kept a blood pressure log.  Reports no adverse effects with amlodipine.    The following portions of the patient's history were reviewed and updated as appropriate: allergies, current medications, past family history, past medical history, past social history, past surgical history and problem list.     Past Medical History:   Diagnosis Date    Allergic     Asthma     Azotemia     BPH with obstruction/lower urinary tract symptoms     Chronic kidney disease     stage 3    Headache(784.0)     Hydronephrosis     Incomplete bladder emptying     Pneumonia     Urinary retention        Allergies   Allergen Reactions    Other      Cats, Ragweed    Pollen Extract        Past Surgical History:   Procedure Laterality Date    APPENDECTOMY      COLONOSCOPY      CYSTOSCOPY      HERNIA REPAIR      KNEE SURGERY  2013    meniscus tear    NE TENDON SHEATH INCISION Right 2021    Procedure: Right long finger trigger finger release;  Surgeon: Tommy Mccarthy MD;  Location: [facility] OR;  Service: Orthopedics    ROOT CANAL      TRANSURETHRAL RESECTION OF PROSTATE      US GUIDANCE  2018       Family History   Family history unknown: Yes   Problem Relation Age of Onset    Family history unknown: Yes    Dementia Father     Glaucoma Father     Completed Suicide  Cousin        Social  History     Socioeconomic History    Marital status: /Civil Union     Spouse name: None    Number of children: None    Years of education: None    Highest education level: None   Occupational History    None   Tobacco Use    Smoking status: Former     Current packs/day: 0.00     Average packs/day: 0.3 packs/day for 12.0 years (3.0 ttl pk-yrs)     Types: Cigarettes     Start date: 1999     Quit date: 2011     Years since quittin.4    Smokeless tobacco: Former   Vaping Use    Vaping status: Never Used   Substance and Sexual Activity    Alcohol use: Yes     Alcohol/week: 2.0 standard drinks of alcohol     Types: 2 Glasses of wine per week     Comment: occassional    Drug use: No    Sexual activity: None   Other Topics Concern    None   Social History Narrative    None     Social Determinants of Health     Financial Resource Strain: Low Risk  (3/3/2023)    Overall Financial Resource Strain (CARDIA)     Difficulty of Paying Living Expenses: Not hard at all   Food Insecurity: No Food Insecurity (3/22/2024)    Hunger Vital Sign     Worried About Running Out of Food in the Last Year: Never true     Ran Out of Food in the Last Year: Never true   Transportation Needs: No Transportation Needs (3/22/2024)    PRAPARE - Transportation     Lack of Transportation (Medical): No     Lack of Transportation (Non-Medical): No   Physical Activity: Not on file   Stress: Not on file   Social Connections: Not on file   Intimate Partner Violence: Not on file   Housing Stability: Unknown (3/22/2024)    Housing Stability Vital Sign     Unable to Pay for Housing in the Last Year: No     Number of Times Moved in the Last Year: Not on file     Homeless in the Last Year: No       Current Outpatient Medications   Medication Sig Dispense Refill    amLODIPine (NORVASC) 2.5 mg tablet Take 1 tablet (2.5 mg total) by mouth daily 90 tablet 0    Breo Ellipta 100-25 MCG/ACT inhaler inhale 1 puff by mouth and INTO THE LUNGS every morning       Cholecalciferol 5000 units capsule Take 1,000 Units by mouth daily      fluticasone (FLONASE) 50 mcg/act nasal spray 1 spray into each nostril daily 18.2 mL 1    ipratropium (ATROVENT) 0.03 % nasal spray 2 sprays into each nostril every 12 (twelve) hours 30 mL 0    latanoprost (XALATAN) 0.005 % ophthalmic solution place 1 drop into both eyes at bedtime      olopatadine (PATANOL) 0.1 % ophthalmic solution 1 drop if needed      Ventolin  (90 Base) MCG/ACT inhaler if needed      acetaminophen (TYLENOL) 500 mg tablet Take 1 500mg tablet in the morning prior to surgery, then 1 tablet every 6 hours for 5-7 days. 30 tablet 0    BREO ELLIPTA 200-25 MCG/INH inhaler   (Patient not taking: Reported on 7/5/2024)       No current facility-administered medications for this visit.       Review of Systems     As noted in HPI    Objective      /80 (BP Location: Left arm, Patient Position: Sitting, Cuff Size: Standard)   Pulse 75   Temp (!) 97.4 °F (36.3 °C) (Temporal)   Resp 18   Wt 98.8 kg (217 lb 12.8 oz)   SpO2 94%   BMI 34.11 kg/m²     Physical Exam  Vitals reviewed.   Constitutional:       General: He is not in acute distress.     Appearance: Normal appearance.   HENT:      Head: Normocephalic and atraumatic.   Eyes:      Conjunctiva/sclera: Conjunctivae normal.   Cardiovascular:      Rate and Rhythm: Normal rate and regular rhythm.      Pulses: Normal pulses.      Heart sounds: Normal heart sounds.   Pulmonary:      Effort: Pulmonary effort is normal.      Breath sounds: Normal breath sounds.   Musculoskeletal:      Right lower leg: No edema.      Left lower leg: No edema.   Skin:     General: Skin is warm and dry.   Neurological:      Mental Status: He is alert and oriented to person, place, and time.   Psychiatric:         Mood and Affect: Mood normal.         Behavior: Behavior normal.         Thought Content: Thought content normal.         Judgment: Judgment normal.             Some portions of  "this record may have been generated with voice recognition software. There may be translation, syntax, or grammatical errors. Occasional wrong word or \"sound-a-like\" substitutions may have occurred due to the inherent limitations of the voice recognition software. Read the chart carefully and recognize, using context, where substations may have occurred. If you have any questions, please contact the dictating provider for clarification or correction, as needed.  "

## 2024-07-17 ENCOUNTER — TELEPHONE (OUTPATIENT)
Dept: NEPHROLOGY | Facility: CLINIC | Age: 79
End: 2024-07-17

## 2024-07-17 NOTE — TELEPHONE ENCOUNTER
MD Magaly Chavez  Please let the patient know that blood pressure numbers are looking great and no changes need to be done right now.  Continue current medication.

## 2024-07-25 ENCOUNTER — OFFICE VISIT (OUTPATIENT)
Dept: SLEEP CENTER | Facility: CLINIC | Age: 79
End: 2024-07-25
Payer: MEDICARE

## 2024-07-25 VITALS
WEIGHT: 217.4 LBS | HEIGHT: 67 IN | BODY MASS INDEX: 34.12 KG/M2 | HEART RATE: 67 BPM | OXYGEN SATURATION: 97 % | SYSTOLIC BLOOD PRESSURE: 138 MMHG | DIASTOLIC BLOOD PRESSURE: 80 MMHG

## 2024-07-25 DIAGNOSIS — N18.30 STAGE 3 CHRONIC KIDNEY DISEASE, UNSPECIFIED WHETHER STAGE 3A OR 3B CKD (HCC): ICD-10-CM

## 2024-07-25 DIAGNOSIS — G47.33 OSA (OBSTRUCTIVE SLEEP APNEA): Primary | ICD-10-CM

## 2024-07-25 DIAGNOSIS — J45.20 MILD INTERMITTENT ASTHMA WITHOUT COMPLICATION: ICD-10-CM

## 2024-07-25 DIAGNOSIS — I10 PRIMARY HYPERTENSION: ICD-10-CM

## 2024-07-25 DIAGNOSIS — R09.82 ALLERGIC RHINITIS WITH POSTNASAL DRIP: ICD-10-CM

## 2024-07-25 DIAGNOSIS — J30.9 ALLERGIC RHINITIS WITH POSTNASAL DRIP: ICD-10-CM

## 2024-07-25 DIAGNOSIS — E66.9 OBESITY (BMI 30-39.9): ICD-10-CM

## 2024-07-25 PROCEDURE — G2211 COMPLEX E/M VISIT ADD ON: HCPCS | Performed by: INTERNAL MEDICINE

## 2024-07-25 PROCEDURE — 99204 OFFICE O/P NEW MOD 45 MIN: CPT | Performed by: INTERNAL MEDICINE

## 2024-07-25 NOTE — PATIENT INSTRUCTIONS
What is SABI?   Obstructive sleep apnea is a common and serious sleep disorder that causes you to stop breathing during sleep. The airway repeatedly becomes blocked, limiting the amount of air that reaches your lungs. When this happens, you may snore loudly or making choking noises as you try to breathe. Your brain and body becomes oxygen deprived and you may wake up. This may happen a few times a night, or in more severe cases, several hundred times a night.   Sleep apnea can make you wake up in the morning feeling tired or unrefreshed even though you have had a full night of sleep. During the day, you may feel fatigued, have difficulty concentrating or you may even unintentionally fall asleep. This is because your body is waking up numerous times throughout the night, even though you might not be conscious of each awakening.  The lack of oxygen your body receives can have negative long-term consequences for your health. This includes:  High blood pressure  Heart disease  Irregular heart rhythms  Stroke  Pre-diabetes and diabetes  Depression  Testing  An objective evaluation of your sleep may be needed before your board certified sleep physician can make a diagnosis. Options include:   In-lab overnight sleep study  This type of sleep study requires you to stay overnight at a sleep center, in a bed that may resemble a hotel room. You will sleep with sensors hooked up to various parts of your body. These sensors record your brain waves, heartbeat, breathing and movement. An overnight sleep study also provides your doctor with the most complete information about your sleep. Learn more about an overnight sleep study.   Home sleep apnea test  Some patients with high risk factors for obstructive sleep apnea and no other medical disorders may be candidates for a home sleep apnea test. The testing equipment differs in that it is less complicated than what is used in an overnight sleep study. As such, does not give all the  data an in-lab will and if negative, may not mean you do not have the problem.  Treatment for sleep apnea includes using a continuous positive airway pressure (CPAP) machine to keep your airway open during sleep. A mask is placed over your nose and mouth, or just your nose. The mask is hooked to the CPAP machine that blows a gentle stream of air into the mask when you breathe. This helps keep your airway open so you can breathe more regularly. Extra oxygen may be given to you through the machine. You may be given a mouth device. It looks like a mouth guard or dental retainer and stops your tongue and mouth tissues from blocking your throat while you sleep. Surgery may be needed to remove extra tissues that block your mouth, throat, or nose.  Manage sleep apnea:   Do not smoke. Nicotine and other chemicals in cigarettes and cigars can cause lung damage. Ask your healthcare provider for information if you currently smoke and need help to quit. E-cigarettes or smokeless tobacco still contain nicotine. Talk to your healthcare provider before you use these products.  Do not drink alcohol or take sedative medicine before you go to sleep. Alcohol and sedatives can relax the muscles and tissues around your throat. This can block the airflow to your lungs.  Maintain a healthy weight. Excess tissue around your throat may restrict your breathing. Ask your healthcare provider for information if you need to lose weight.  Sleep on your side or use pillows designed to prevent sleep apnea. This prevents your tongue or other tissues from blocking your throat. You can also raise the head of your bed.  Driving Safety. Refrain from driving when drowsy.   Follow up with your healthcare provider as directed: Write down your questions so you remember to ask them during your visits.  Go to AASM website for more information: Sleepeducation.org  What is SABI?   Obstructive sleep apnea is a common and serious sleep disorder that causes you to  stop breathing during sleep. The airway repeatedly becomes blocked, limiting the amount of air that reaches your lungs. When this happens, you may snore loudly or making choking noises as you try to breathe. Your brain and body becomes oxygen deprived and you may wake up. This may happen a few times a night, or in more severe cases, several hundred times a night.   Sleep apnea can make you wake up in the morning feeling tired or unrefreshed even though you have had a full night of sleep. During the day, you may feel fatigued, have difficulty concentrating or you may even unintentionally fall asleep. This is because your body is waking up numerous times throughout the night, even though you might not be conscious of each awakening.  The lack of oxygen your body receives can have negative long-term consequences for your health. This includes:  High blood pressure  Heart disease  Irregular heart rhythms  Stroke  Pre-diabetes and diabetes  Depression  Testing  An objective evaluation of your sleep may be needed before your board certified sleep physician can make a diagnosis. Options include:   In-lab overnight sleep study  This type of sleep study requires you to stay overnight at a sleep center, in a bed that may resemble a hotel room. You will sleep with sensors hooked up to various parts of your body. These sensors record your brain waves, heartbeat, breathing and movement. An overnight sleep study also provides your doctor with the most complete information about your sleep. Learn more about an overnight sleep study.   Home sleep apnea test  Some patients with high risk factors for obstructive sleep apnea and no other medical disorders may be candidates for a home sleep apnea test. The testing equipment differs in that it is less complicated than what is used in an overnight sleep study. As such, does not give all the data an in-lab will and if negative, may not mean you do not have the problem.  Treatment for  sleep apnea includes using a continuous positive airway pressure (CPAP) machine to keep your airway open during sleep. A mask is placed over your nose and mouth, or just your nose. The mask is hooked to the CPAP machine that blows a gentle stream of air into the mask when you breathe. This helps keep your airway open so you can breathe more regularly. Extra oxygen may be given to you through the machine. You may be given a mouth device. It looks like a mouth guard or dental retainer and stops your tongue and mouth tissues from blocking your throat while you sleep. Surgery may be needed to remove extra tissues that block your mouth, throat, or nose.  Manage sleep apnea:   Do not smoke. Nicotine and other chemicals in cigarettes and cigars can cause lung damage. Ask your healthcare provider for information if you currently smoke and need help to quit. E-cigarettes or smokeless tobacco still contain nicotine. Talk to your healthcare provider before you use these products.  Do not drink alcohol or take sedative medicine before you go to sleep. Alcohol and sedatives can relax the muscles and tissues around your throat. This can block the airflow to your lungs.  Maintain a healthy weight. Excess tissue around your throat may restrict your breathing. Ask your healthcare provider for information if you need to lose weight.  Sleep on your side or use pillows designed to prevent sleep apnea. This prevents your tongue or other tissues from blocking your throat. You can also raise the head of your bed.  Driving Safety. Refrain from driving when drowsy.   Follow up with your healthcare provider as directed: Write down your questions so you remember to ask them during your visits.  Go to AASM website for more information: Sleepeducation.org    Nursing Support:  When: Monday through Friday 7:30A-4:30PM except holidays  Where: Our direct line is [phone]  *3  *1.      If you are having a true emergency please call 911.  In the  event that the line is busy or it is after hours please leave a voice message and we will return your call.  Please speak clearly, leaving your full name, birth date, best number to reach you and the reason for your call.   Medication refills: We will need the name of the medication, the dosage, the ordering provider, whether you get a 30 or 90 day refill, and the pharmacy name and address.  Medications will be ordered by the provider only.  Nurses cannot call in prescriptions.  Please allow 7 days for medication refills.  Physician requested updates: If your provider requested that you call with an update after starting medication, please be ready to provide us the medication and dosage, what time you take your medication, the time you attempt to fall asleep, time you fall asleep, when you wake up, and what time you get out of bed.  Sleep Study Results: We will contact you with sleep study results and/or next steps after the physician has reviewed your testing.

## 2024-07-25 NOTE — PROGRESS NOTES
Consultation - Sleep Center   Rick Burks  79 y.o. male  :1945  MRN:[id number]  DOS:2024    Physician Requesting Consult: Benjamin Rivera DO             Reason for Consult : At your kind request I saw Rick Burks for initial sleep evaluation today. The patient is here accompanied by his daughter to evaluate for suspected Obstructive Sleep Apnea.      PFSH, Problem List, Medications & Allergies were reviewed in EMR.    Rick  has a past medical history of Allergic, Asthma, Azotemia, BPH with obstruction/lower urinary tract symptoms, Chronic kidney disease, Headache(784.0), Hydronephrosis, Incomplete bladder emptying, Pneumonia, and Urinary retention.      He has a current medication list which includes the following prescription(s): amlodipine, breo ellipta, cholecalciferol, fluticasone, ipratropium, latanoprost, olopatadine, ventolin hfa, acetaminophen, and breo ellipta.      HPI: He is here at behest of his PCP because of recently diagnosed hypertension for which he was started on medication.  Rick alone and is un aware of snoring or breathing difficulties during sleep.  Other complaints: None. Restless Leg Syndrome: reports no suggestive symptoms.  .  Parasomnia: no features reported    Sleep Routine (averaged): Typical Bedtime: 8:00-8:45 pm.  Gets OOB: 6:30 AM. TIB:9 hrs.   Sleep latency:<  45 minutes watching TV in bed; Sleep Interruptions:  1-2,  because of nocturia and able to fall back asleep. Awakens:spontaneously  Upon awakening: Usually feels refreshed.  He estimates getting 8 hrs sleep.  Daytime Function:Rick reports episodic excessive daytime sleepiness feels like napping & does when has the opportunity 2-3 times a week. He rated himself at Total score: 5 /24 on the [place] Sleepiness Scale.     Habits:   reports that he quit smoking about 13 years ago. His smoking use included cigarettes. He started smoking about 25 years ago. He has a 3 pack-year smoking history. He has quit  "using smokeless tobacco.;  reports current alcohol use of about 2.0 standard drinks of alcohol per week.; Reports no history of drug use.;  E-Cigarette/Vaping  Never User; Caffeine use:moderate until around 6 PM; Exercise routine: regular.    Occupation: Retired   Family History: Negative for sleep disturbance.  ROS: Significant for weight has been stable.  He has some postnasal drip due to environmental allergies.  Review of systems was otherwise negative..     EXAM:  /80   Pulse 67   Ht 5' 7\" (1.702 m)   Wt 98.6 kg (217 lb 6.4 oz)   SpO2 97%   BMI 34.05 kg/m²    General: Well groomed male, well appearing, in no apparent distress.   Neurological: Alert and orientated; cooperative; Cranial nerves intact;    Psychiatric: Speech: Clear and coherent; normal mood, affect & thought   Skin: Warm and dry; Color& Hydration good; no facial rashes or lesions   HEENT:  Craniofacial anatomy: normal Sinuses: Non-tender. TMJ: Normal    Eyes: EOM's intact; conjunctiva/corneas clear   Ears: Appear normal     Nasal Airway: is patent Septum: Intact; Turbinates: Normal; Rhinorrhea: None  Mouth: Lips: Normal posture; Dentition: normal . Mucosa: Moist; Hard Palate:normal    Oropharryx: crowded and AP narrowing Tongue: Mallampati:Class III and MobileSoft Palate:  redundant  and Uvular Hypertrophy Tonsils: absent  Neck: Is thick; neck Circumference: 16 \"; supple; no abnormal masses; Thyroid: Normal. Trachea: Central.    Lymph: No cervical Lymhadenopathy  Heart: S1,S2 normal; RRR; no gallop; no murmur   Lungs: Respiratory Effort: Normal. Air entry good bilaterally.  No wheezes.  No rales  Abdomen: Obese, soft & non-tender    Extremities: No pedal edema.  No clubbing or cyanosis.    Musculoskeletal:  Motor normal; Gait: Normal.       IMPRESSION: Primary/Secondary Sleep Diagnoses (to Medical or Psych conditions) & Comorbidities   1. SABI (obstructive sleep apnea)  Home Study      2. Primary hypertension  Ambulatory Referral to " "Sleep Medicine      3. Stage 3 chronic kidney disease, unspecified whether stage 3a or 3b CKD (HCC)        4. Mild intermittent asthma without complication        5. Allergic rhinitis with postnasal drip        6. Obesity (BMI 30-39.9)             PLAN:   1. With respect to above conditions, comprehensive counseling provided on pathophysiology; effects on symptoms and comorbidities, diagnostic strategies & limitations; treatment options; risks or no treament; risks & benefits of the various therapeutic options; costs and insurance aspects.     2. I advised weight reduction, avoiding sleeping supine, using alcohol or sedating medications close to bed time and on safe driving practices.    3. Further evaluation is indicated and at his behest, home sleep testing will be scheduled.  Patient understands limitations of home sleep testing and in lab study may be necessary.  4.  Patient is willing to try Positive airway pressure therapy and will be scheduled for a titration study if indicated.  5.  Nasal symptoms may improve with regular nasal saline rinse 1-2 times a day (Neilmed or Ayrs Sinus Rinse), followed by topical nasal steroid (e..g. OTC Flonase, Nasacort, Rhinocort) once a day if necessary.  6. Follow-up to be scheduled after testing/initiation of therapy to review results, further details of treatment options and to adjust therapy.    Sincerely,      Authenticated electronically on 07/25/24   Board Certified Specialist     Portions of the record may have been created with voice recognition software. Occasional wrong word or \"sound a like\" substitutions may have occurred due to the inherent limitations of voice recognition software. There may also be notations and random deletions of words or characters from malfunctioning software. Read the chart carefully and recognize, using context, where substitutions/deletions have occurred.     "

## 2024-07-26 ENCOUNTER — HOSPITAL ENCOUNTER (OUTPATIENT)
Dept: SLEEP CENTER | Facility: CLINIC | Age: 79
Discharge: HOME | End: 2024-07-26
Payer: MEDICARE

## 2024-07-26 DIAGNOSIS — G47.33 OSA (OBSTRUCTIVE SLEEP APNEA): ICD-10-CM

## 2024-07-26 PROCEDURE — G0399 HOME SLEEP TEST/TYPE 3 PORTA: HCPCS

## 2024-07-26 NOTE — PROGRESS NOTES
Home Sleep Study Documentation    HOME STUDY DEVICE: Noxturnal no                                           Krystyna G3 yes device # 15      Pre-Sleep Home Study:    Set-up and instructions performed by: Dorothy    Technician performed demonstration for Patient: yes    Return demonstration performed by Patient: yes    Written instructions provided to Patient: yes    Patient signed consent form: yes        Post-Sleep Home Study:    Additional comments by Patient: None    Home Sleep Study Failed:no:    Failure reason: N/A    Reported or Detected: N/A    Scored by: LORETO Driver

## 2024-07-30 DIAGNOSIS — G47.34 SLEEP RELATED HYPOXIA: Primary | ICD-10-CM

## 2024-07-30 DIAGNOSIS — G47.33 OSA (OBSTRUCTIVE SLEEP APNEA): ICD-10-CM

## 2024-08-01 ENCOUNTER — TELEPHONE (OUTPATIENT)
Dept: SLEEP CENTER | Facility: CLINIC | Age: 79
End: 2024-08-01

## 2024-08-01 NOTE — TELEPHONE ENCOUNTER
Home sleep study resulted, confirms the diagnosis of moderate SABI with severe hypoxia.    CPAP titration study recommended and ordered.    Call placed to patient, advised of above.    Patient declining to complete CPAP study at this time, would prefer to discuss with PCP prior to scheduling any additional testing.

## 2024-09-04 ENCOUNTER — OFFICE VISIT (OUTPATIENT)
Dept: FAMILY MEDICINE CLINIC | Facility: MEDICAL CENTER | Age: 79
End: 2024-09-04
Payer: MEDICARE

## 2024-09-04 VITALS
TEMPERATURE: 97.9 F | WEIGHT: 216.2 LBS | HEIGHT: 68 IN | HEART RATE: 83 BPM | OXYGEN SATURATION: 98 % | BODY MASS INDEX: 32.77 KG/M2 | DIASTOLIC BLOOD PRESSURE: 86 MMHG | SYSTOLIC BLOOD PRESSURE: 138 MMHG | RESPIRATION RATE: 18 BRPM

## 2024-09-04 DIAGNOSIS — R42 VERTIGO: Primary | ICD-10-CM

## 2024-09-04 PROCEDURE — 99214 OFFICE O/P EST MOD 30 MIN: CPT

## 2024-09-04 NOTE — PROGRESS NOTES
"Assessment/Plan:       1. Vertigo  Advised to make changes in position and head movement slowly.  Advised to ensure adequate hydration.  Discussed meclizine, patient would like to hold off at this time.  Patient agreeable to vestibular therapy.  Scheduled for follow-up with PCP Dr. Rivera in 3 weeks.  - Ambulatory Referral to Physical Therapy; Future      Subjective:      Patient ID: Rick Burks is a 79 y.o. male.    79 year old male presents with his daughter today. He is a patient of Dr. Rivera. He is complaining of 2 episodes of dizziness over the past 2 days. First episode occurred on Monday (2 days ago) while bending forward to check his coffee pot.  At that time he was evaluated by [facility] urgent care.  He denies any episodes yesterday. This morning had another episode that occurred with head movement. Symptoms self resolved quickly both times. He reports symptoms only occurred with head movement or bending forward. He is asymptomatic at rest.   Denies cp, sob, palpitations, headache, visual changes, slurred speech, confusion.  He is currently asymptomatic.   He tells me he drinks approx. 32-48 ounces of water per day.          The following portions of the patient's history were reviewed and updated as appropriate: current medications, past family history, past medical history, past social history, past surgical history, and problem list.    Review of Systems   Constitutional: Negative.    HENT: Negative.     Eyes: Negative.    Respiratory: Negative.     Cardiovascular: Negative.    Gastrointestinal: Negative.    Endocrine: Negative.    Genitourinary: Negative.    Musculoskeletal: Negative.    Skin: Negative.    Allergic/Immunologic: Negative.    Neurological:  Positive for dizziness.   Hematological: Negative.    Psychiatric/Behavioral: Negative.           Objective:      /86   Pulse 83   Temp 97.9 °F (36.6 °C) (Oral)   Resp 18   Ht 5' 7.5\" (1.715 m)   Wt 98.1 kg (216 lb 3.2 oz)   SpO2 98%   BMI " 33.36 kg/m²          Physical Exam  Vitals and nursing note reviewed.   Constitutional:       General: He is not in acute distress.     Appearance: Normal appearance. He is not ill-appearing.   HENT:      Head: Normocephalic and atraumatic.      Right Ear: Tympanic membrane, ear canal and external ear normal. There is no impacted cerumen.      Left Ear: Tympanic membrane, ear canal and external ear normal. There is no impacted cerumen.      Nose: Nose normal.      Mouth/Throat:      Mouth: Mucous membranes are moist.      Pharynx: Oropharynx is clear.   Eyes:      General:         Right eye: No discharge.         Left eye: No discharge.      Extraocular Movements: Extraocular movements intact.      Conjunctiva/sclera: Conjunctivae normal.      Pupils: Pupils are equal, round, and reactive to light.   Neck:      Vascular: No carotid bruit.   Cardiovascular:      Rate and Rhythm: Normal rate and regular rhythm.      Pulses: Normal pulses.      Heart sounds: Normal heart sounds.   Pulmonary:      Effort: Pulmonary effort is normal.      Breath sounds: Normal breath sounds.   Skin:     General: Skin is warm and dry.   Neurological:      General: No focal deficit present.      Mental Status: He is alert and oriented to person, place, and time. Mental status is at baseline.      GCS: GCS eye subscore is 4. GCS verbal subscore is 5. GCS motor subscore is 6.      Cranial Nerves: No facial asymmetry.      Sensory: Sensation is intact.      Motor: Motor function is intact. No weakness.      Gait: Gait is intact. Gait normal.   Psychiatric:         Mood and Affect: Mood normal.         Behavior: Behavior normal.         Thought Content: Thought content normal.                    CONNIE Moore

## 2024-09-05 ENCOUNTER — TELEPHONE (OUTPATIENT)
Age: 79
End: 2024-09-05

## 2024-09-05 DIAGNOSIS — R42 VERTIGO: Primary | ICD-10-CM

## 2024-09-05 RX ORDER — MECLIZINE HYDROCHLORIDE 25 MG/1
25 TABLET ORAL 3 TIMES DAILY PRN
Qty: 30 TABLET | Refills: 0 | Status: SHIPPED | OUTPATIENT
Start: 2024-09-05

## 2024-09-05 NOTE — TELEPHONE ENCOUNTER
Patient called in stating at yesterdays appointment a medication for vertigo was discussed and thought about and would like to know if Dr. Rivera could send in the script to the [email or web address] on [address]. Please advise. Thank you.

## 2024-09-17 DIAGNOSIS — I10 PRIMARY HYPERTENSION: ICD-10-CM

## 2024-09-17 NOTE — TELEPHONE ENCOUNTER
Reason for call:   [x] Refill   [] Prior Auth  [] Other:     Office:   [x] PCP/Provider -   [] Specialty/Provider -     Medication:   amLODIPine (NORVASC) 2.5 mg tablet     Dose/Frequency: Take 1 tablet (2.5 mg total) by mouth daily     Quantity: 90    Pharmacy: [id number] - [place] - [address] [place] [address]     Does the patient have enough for 3 days?   [x] Yes   [] No - Send as HP to POD

## 2024-09-18 ENCOUNTER — RA CDI HCC (OUTPATIENT)
Dept: OTHER | Facility: HOSPITAL | Age: 79
End: 2024-09-18

## 2024-09-18 RX ORDER — AMLODIPINE BESYLATE 2.5 MG/1
2.5 TABLET ORAL DAILY
Qty: 90 TABLET | Refills: 1 | Status: SHIPPED | OUTPATIENT
Start: 2024-09-18

## 2024-09-24 ENCOUNTER — OFFICE VISIT (OUTPATIENT)
Dept: FAMILY MEDICINE CLINIC | Facility: MEDICAL CENTER | Age: 79
End: 2024-09-24
Payer: MEDICARE

## 2024-09-24 VITALS
WEIGHT: 218 LBS | HEART RATE: 86 BPM | HEIGHT: 68 IN | SYSTOLIC BLOOD PRESSURE: 136 MMHG | TEMPERATURE: 97.4 F | DIASTOLIC BLOOD PRESSURE: 88 MMHG | RESPIRATION RATE: 18 BRPM | OXYGEN SATURATION: 97 % | BODY MASS INDEX: 33.04 KG/M2

## 2024-09-24 DIAGNOSIS — G47.33 OSA (OBSTRUCTIVE SLEEP APNEA): ICD-10-CM

## 2024-09-24 DIAGNOSIS — I10 PRIMARY HYPERTENSION: ICD-10-CM

## 2024-09-24 DIAGNOSIS — J45.20 MILD INTERMITTENT ASTHMA WITHOUT COMPLICATION: ICD-10-CM

## 2024-09-24 DIAGNOSIS — N18.30 STAGE 3 CHRONIC KIDNEY DISEASE, UNSPECIFIED WHETHER STAGE 3A OR 3B CKD (HCC): ICD-10-CM

## 2024-09-24 DIAGNOSIS — Z09 FOLLOW-UP EXAM: Primary | ICD-10-CM

## 2024-09-24 PROBLEM — R73.01 ELEVATED FASTING BLOOD SUGAR: Status: RESOLVED | Noted: 2024-03-22 | Resolved: 2024-09-24

## 2024-09-24 PROCEDURE — 99214 OFFICE O/P EST MOD 30 MIN: CPT | Performed by: STUDENT IN AN ORGANIZED HEALTH CARE EDUCATION/TRAINING PROGRAM

## 2024-09-24 PROCEDURE — G2211 COMPLEX E/M VISIT ADD ON: HCPCS | Performed by: STUDENT IN AN ORGANIZED HEALTH CARE EDUCATION/TRAINING PROGRAM

## 2024-09-24 NOTE — PROGRESS NOTES
[facility] - Clinic Note  Benjamin Rivera DO, 24     Rick Burks MRN: [id number] : 1945 Age: 79 y.o.     Assessment/Plan     1. Follow-up exam    -Return in 6 months and sooner as needed    2. Primary hypertension    -Stable current regimen, continue amlodipine at current dose    3. Mild intermittent asthma without complication    -Breo Ellipta maintenance inhaler and albuterol inhaler as needed    4. SABI (obstructive sleep apnea)    -Upcoming appointment with sleep medicine    5. Stage 3 chronic kidney disease, unspecified whether stage 3a or 3b CKD (HCC)    -Following with nephrology      Rick Burks acknowledged understanding of treatment plan, all questions answered.    Subjective      Rick Burks is a 79 y.o. male who presents for medical follow-up.  Patient presents today with his daughter.  Patient has been recently diagnosed with foot fracture, following with podiatry, wearing boot.    The following portions of the patient's history were reviewed and updated as appropriate: allergies, current medications, past family history, past medical history, past social history, past surgical history and problem list.   Past Medical History:   Diagnosis Date    Allergic     Asthma     Azotemia     BPH with obstruction/lower urinary tract symptoms     Chronic kidney disease     stage 3    Headache(784.0)     Hydronephrosis     Incomplete bladder emptying     Pneumonia     Urinary retention        Allergies   Allergen Reactions    Other      Cats, Ragweed    Pollen Extract        Past Surgical History:   Procedure Laterality Date    APPENDECTOMY      COLONOSCOPY      CYSTOSCOPY      HERNIA REPAIR      KNEE SURGERY      meniscus tear    WI TENDON SHEATH INCISION Right 2021    Procedure: Right long finger trigger finger release;  Surgeon: Tommy Mccarthy MD;  Location: [facility];  Service: Orthopedics    ROOT CANAL      TRANSURETHRAL RESECTION OF PROSTATE        GUIDANCE  2018       Family History   Family history unknown: Yes   Problem Relation Age of Onset    Family history unknown: Yes    Dementia Father     Glaucoma Father     Completed Suicide  Cousin        Social History     Socioeconomic History    Marital status: /Civil Union     Spouse name: None    Number of children: None    Years of education: None    Highest education level: None   Occupational History    None   Tobacco Use    Smoking status: Former     Current packs/day: 0.00     Average packs/day: 0.3 packs/day for 12.0 years (3.0 ttl pk-yrs)     Types: Cigarettes     Start date: 1999     Quit date: 2011     Years since quittin.6    Smokeless tobacco: Former   Vaping Use    Vaping status: Never Used   Substance and Sexual Activity    Alcohol use: Yes     Alcohol/week: 2.0 standard drinks of alcohol     Types: 2 Glasses of wine per week     Comment: occassional    Drug use: No    Sexual activity: None   Other Topics Concern    None   Social History Narrative    None     Social Determinants of Health     Financial Resource Strain: Low Risk  (3/3/2023)    Overall Financial Resource Strain (CARDIA)     Difficulty of Paying Living Expenses: Not hard at all   Food Insecurity: No Food Insecurity (3/22/2024)    Hunger Vital Sign     Worried About Running Out of Food in the Last Year: Never true     Ran Out of Food in the Last Year: Never true   Transportation Needs: No Transportation Needs (3/22/2024)    PRAPARE - Transportation     Lack of Transportation (Medical): No     Lack of Transportation (Non-Medical): No   Physical Activity: Not on file   Stress: Not on file   Social Connections: Not on file   Intimate Partner Violence: Not on file   Housing Stability: Unknown (3/22/2024)    Housing Stability Vital Sign     Unable to Pay for Housing in the Last Year: No     Number of Times Moved in the Last Year: Not on file     Homeless in the Last Year: No       Current Outpatient Medications  "  Medication Sig Dispense Refill    acetaminophen (TYLENOL) 500 mg tablet Take 1 500mg tablet in the morning prior to surgery, then 1 tablet every 6 hours for 5-7 days. 30 tablet 0    amLODIPine (NORVASC) 2.5 mg tablet take 1 tablet by mouth once daily 90 tablet 1    Breo Ellipta 100-25 MCG/ACT inhaler inhale 1 puff by mouth and INTO THE LUNGS every morning      Cholecalciferol 5000 units capsule Take 1,000 Units by mouth daily      fluticasone (FLONASE) 50 mcg/act nasal spray 1 spray into each nostril daily 18.2 mL 1    ipratropium (ATROVENT) 0.03 % nasal spray 2 sprays into each nostril every 12 (twelve) hours 30 mL 0    latanoprost (XALATAN) 0.005 % ophthalmic solution place 1 drop into both eyes at bedtime      meclizine (ANTIVERT) 25 mg tablet Take 1 tablet (25 mg total) by mouth 3 (three) times a day as needed for dizziness 30 tablet 0    olopatadine (PATANOL) 0.1 % ophthalmic solution 1 drop if needed      Ventolin  (90 Base) MCG/ACT inhaler if needed      BREO ELLIPTA 200-25 MCG/INH inhaler   (Patient not taking: Reported on 7/5/2024)       No current facility-administered medications for this visit.       Review of Systems     As noted in HPI    Objective      /100 (BP Location: Left arm, Patient Position: Sitting, Cuff Size: Standard)   Pulse 86   Temp (!) 97.4 °F (36.3 °C) (Temporal)   Resp 18   Ht 5' 7.5\" (1.715 m)   Wt 98.9 kg (218 lb)   SpO2 97%   BMI 33.64 kg/m²     Physical Exam  Vitals reviewed.   Constitutional:       General: He is not in acute distress.     Appearance: Normal appearance.   HENT:      Head: Normocephalic and atraumatic.   Eyes:      Conjunctiva/sclera: Conjunctivae normal.   Cardiovascular:      Rate and Rhythm: Normal rate and regular rhythm.      Pulses: Normal pulses.      Heart sounds: Normal heart sounds.   Pulmonary:      Effort: Pulmonary effort is normal. No respiratory distress.      Breath sounds: Wheezing present.   Musculoskeletal:      Comments: " "Right walking boot in place   Skin:     General: Skin is warm and dry.   Neurological:      Mental Status: He is alert and oriented to person, place, and time.   Psychiatric:         Mood and Affect: Mood normal.         Behavior: Behavior normal.         Thought Content: Thought content normal.             Some portions of this record may have been generated with voice recognition software. There may be translation, syntax, or grammatical errors. Occasional wrong word or \"sound-a-like\" substitutions may have occurred due to the inherent limitations of the voice recognition software. Read the chart carefully and recognize, using context, where substations may have occurred. If you have any questions, please contact the dictating provider for clarification or correction, as needed.  "

## 2024-10-02 ENCOUNTER — TELEPHONE (OUTPATIENT)
Age: 79
End: 2024-10-02

## 2024-10-29 ENCOUNTER — LAB (OUTPATIENT)
Dept: LAB | Facility: MEDICAL CENTER | Age: 79
End: 2024-10-29
Payer: MEDICARE

## 2024-10-29 DIAGNOSIS — N18.32 STAGE 3B CHRONIC KIDNEY DISEASE (HCC): ICD-10-CM

## 2024-10-29 DIAGNOSIS — Z86.39 HISTORY OF HYPERKALEMIA: ICD-10-CM

## 2024-10-29 LAB
ALBUMIN SERPL BCG-MCNC: 4.1 G/DL (ref 3.5–5)
ANION GAP SERPL CALCULATED.3IONS-SCNC: 9 MMOL/L (ref 4–13)
BUN SERPL-MCNC: 28 MG/DL (ref 5–25)
CALCIUM SERPL-MCNC: 9.5 MG/DL (ref 8.4–10.2)
CHLORIDE SERPL-SCNC: 100 MMOL/L (ref 96–108)
CO2 SERPL-SCNC: 27 MMOL/L (ref 21–32)
CREAT SERPL-MCNC: 1.57 MG/DL (ref 0.6–1.3)
CREAT UR-MCNC: 100.7 MG/DL
CREAT UR-MCNC: 100.7 MG/DL
ERYTHROCYTE [DISTWIDTH] IN BLOOD BY AUTOMATED COUNT: 13.6 % (ref 11.6–15.1)
GFR SERPL CREATININE-BSD FRML MDRD: 41 ML/MIN/1.73SQ M
GLUCOSE SERPL-MCNC: 85 MG/DL (ref 65–140)
HCT VFR BLD AUTO: 47.5 % (ref 36.5–49.3)
HGB BLD-MCNC: 15.5 G/DL (ref 12–17)
MCH RBC QN AUTO: 30.2 PG (ref 26.8–34.3)
MCHC RBC AUTO-ENTMCNC: 32.6 G/DL (ref 31.4–37.4)
MCV RBC AUTO: 92 FL (ref 82–98)
MICROALBUMIN UR-MCNC: 73.6 MG/L
MICROALBUMIN/CREAT 24H UR: 73 MG/G CREATININE (ref 0–30)
PHOSPHATE SERPL-MCNC: 3.1 MG/DL (ref 2.3–4.1)
PLATELET # BLD AUTO: 281 THOUSANDS/UL (ref 149–390)
PMV BLD AUTO: 9.6 FL (ref 8.9–12.7)
POTASSIUM SERPL-SCNC: 4.7 MMOL/L (ref 3.5–5.3)
PROT UR-MCNC: 16.5 MG/DL
PROT/CREAT UR: 0.2 MG/G{CREAT} (ref 0–0.1)
PTH-INTACT SERPL-MCNC: 59.9 PG/ML (ref 12–88)
RBC # BLD AUTO: 5.14 MILLION/UL (ref 3.88–5.62)
SODIUM SERPL-SCNC: 136 MMOL/L (ref 135–147)
WBC # BLD AUTO: 7.23 THOUSAND/UL (ref 4.31–10.16)

## 2024-10-29 PROCEDURE — 84156 ASSAY OF PROTEIN URINE: CPT

## 2024-10-29 PROCEDURE — 83970 ASSAY OF PARATHORMONE: CPT

## 2024-10-29 PROCEDURE — 82043 UR ALBUMIN QUANTITATIVE: CPT

## 2024-10-29 PROCEDURE — 80069 RENAL FUNCTION PANEL: CPT

## 2024-10-29 PROCEDURE — 36415 COLL VENOUS BLD VENIPUNCTURE: CPT

## 2024-10-29 PROCEDURE — 82570 ASSAY OF URINE CREATININE: CPT

## 2024-10-29 PROCEDURE — 85027 COMPLETE CBC AUTOMATED: CPT

## 2024-11-07 ENCOUNTER — OFFICE VISIT (OUTPATIENT)
Dept: NEPHROLOGY | Facility: CLINIC | Age: 79
End: 2024-11-07
Payer: MEDICARE

## 2024-11-07 VITALS
SYSTOLIC BLOOD PRESSURE: 128 MMHG | BODY MASS INDEX: 33.34 KG/M2 | HEIGHT: 68 IN | WEIGHT: 220 LBS | HEART RATE: 87 BPM | DIASTOLIC BLOOD PRESSURE: 86 MMHG

## 2024-11-07 DIAGNOSIS — N18.32 STAGE 3B CHRONIC KIDNEY DISEASE (HCC): Primary | ICD-10-CM

## 2024-11-07 DIAGNOSIS — I12.9 BENIGN HYPERTENSION WITH CKD (CHRONIC KIDNEY DISEASE) STAGE III (HCC): ICD-10-CM

## 2024-11-07 DIAGNOSIS — N18.30 BENIGN HYPERTENSION WITH CKD (CHRONIC KIDNEY DISEASE) STAGE III (HCC): ICD-10-CM

## 2024-11-07 DIAGNOSIS — G47.33 OSA (OBSTRUCTIVE SLEEP APNEA): ICD-10-CM

## 2024-11-07 PROCEDURE — 99214 OFFICE O/P EST MOD 30 MIN: CPT | Performed by: INTERNAL MEDICINE

## 2024-11-07 RX ORDER — DAPAGLIFLOZIN 5 MG/1
5 TABLET, FILM COATED ORAL DAILY
Qty: 30 TABLET | Refills: 3 | Status: SHIPPED | OUTPATIENT
Start: 2024-11-07 | End: 2024-12-07

## 2024-11-07 NOTE — ASSESSMENT & PLAN NOTE
- Previous nephrologist: Dr. oStero Cummins at [facility]  - Etiology/risk factors: Etiology thought to be due to obstructive uropathy (moderate to severe left renal atrophy seen on imaging), age-related nephron loss  - Baseline Cr: 1.5-1.95, most recently 1.57 10/2024  - Urinalysis: 1+ protein, 1-2 WBC, no RBC 04/2024  - Proteinuria: UACR 73 mg/g 10/2024, UPCR 200 mg 10/2024  - Imaging: Moderate to severe left renal atrophy, no obstructive uropathy on CT abdomen 10/2020  - KFRE  0.6% 2 year, 2% 5-year risk  - Start Farxiga 5 mg daily to retard progression of CKD  - Check BMP in 1 month after starting Farxiga  - Discussed risk factor reduction to slow progression of chronic kidney disease  Intensive blood pressure control as below   Lifestyle modifications (weight loss/exercise)  Avoidance of NSAIDs     # Screening for Anemia   - Target Hb: More than 10 g/dL  - Most recent hemoglobin: 15.5 g/dL 10/2024     # Electrolytes/Acid Base status   >>History of hyperkalemia  - Most recent serum potassium 4.7 10/2024  - Continue low potassium diet  - Addition of Farxiga will help with kaliuresis  - Repeat BMP in 1 month after starting Farxiga     # CKD Mineral and Bone parameters  - Goal Ca 8.5-10 mg/dL, goal Phos 2.7-4.6 mg/dL, goal iPTH 30-70 pg/mL  - PTH 59.9, calcium 9.5, phosphorus 3.1 as of 10/2024 all at goal

## 2024-11-07 NOTE — PATIENT INSTRUCTIONS
Kidney numbers are currently stable at stage IIIb kidney disease.    There is protein in the urine and we will start Farxiga 5 mg daily.    After starting Farxiga, check blood test in 1 month.    Continue all your medications as such.    Repeat full panel of lab testing including blood test and urine test in 6 months before office visit in 6 months.

## 2024-11-07 NOTE — ASSESSMENT & PLAN NOTE
- Home sleep study confirmed diagnosis of moderate SABI with hypoxia  - CPAP titration study was advised and currently pending  - Discussed importance of treatment of sleep apnea and management of hypertension

## 2024-11-07 NOTE — PROGRESS NOTES
Ambulatory Visit  Name: Rick Burks      : 1945      MRN: [id number]  Encounter Provider: Tony Erickson MD  Encounter Date: 2024   Encounter department: [facility] [place]    Assessment & Plan  Stage 3b chronic kidney disease (HCC)  - Previous nephrologist: Dr. Sotero Cummins at [facility]  - Etiology/risk factors: Etiology thought to be due to obstructive uropathy (moderate to severe left renal atrophy seen on imaging), age-related nephron loss  - Baseline Cr: 1.5-1.95, most recently 1.57 10/2024  - Urinalysis: 1+ protein, 1-2 WBC, no RBC 2024  - Proteinuria: UACR 73 mg/g 10/2024, UPCR 200 mg 10/2024  - Imaging: Moderate to severe left renal atrophy, no obstructive uropathy on CT abdomen 10/2020  - KFRE  0.6% 2 year, 2% 5-year risk  - Start Farxiga 5 mg daily to retard progression of CKD  - Check BMP in 1 month after starting Farxiga  - Discussed risk factor reduction to slow progression of chronic kidney disease  Intensive blood pressure control as below   Lifestyle modifications (weight loss/exercise)  Avoidance of NSAIDs     # Screening for Anemia   - Target Hb: More than 10 g/dL  - Most recent hemoglobin: 15.5 g/dL 10/2024     # Electrolytes/Acid Base status   >>History of hyperkalemia  - Most recent serum potassium 4.7 10/2024  - Continue low potassium diet  - Addition of Farxiga will help with kaliuresis  - Repeat BMP in 1 month after starting Farxiga     # CKD Mineral and Bone parameters  - Goal Ca 8.5-10 mg/dL, goal Phos 2.7-4.6 mg/dL, goal iPTH 30-70 pg/mL  - PTH 59.9, calcium 9.5, phosphorus 3.1 as of 10/2024 all at goal  Benign hypertension with CKD (chronic kidney disease) stage III (HCC)  - Goal BP <120/80 per KDIGO guidelines   - Volume status: Euvolemic  - Status: Blood pressure currently currently above goal  - 24 hour ABPM 2024, average 137/85, daytime average 138/87, nighttime average 136/81, MA BP dipping 0.74%   - Current antihypertensive regimen:  Amlodipine 2.5 mg daily  - Changes: Addition of Farxiga will provide weak diuretic effect with improvement in blood pressure  - Due to non-dipping on 24-hour ABPM, patient was referred for evaluation of SABI  SABI (obstructive sleep apnea)  - Home sleep study confirmed diagnosis of moderate SABI with hypoxia  - CPAP titration study was advised and currently pending  - Discussed importance of treatment of sleep apnea and management of hypertension    History of Present Illness     Rick Burks is a 79 y.o. male     Since last visit: He has been feeling well.  He denies dyspnea.  He denies leg swelling.  He denies any trouble with urination.    Rick Burks is a 79 y.o. male who has history of obstructive uropathy due to BPH status post laser procedure with urology.  He denies history of hypertension.  He does not have diabetes.  There is no family history of kidney disease.  There is no history of prolonged NSAID use.  He is currently feeling well.  He denies dyspnea.  He denies leg swelling.  He denies any trouble with urination.     >> Major risk factors for CKD  - Diabetes: No  - Hypertension: No  - Age >= 55 years: Yes  - Family history of kidney disease: No   - Obesity or metabolic syndrome: Yes      >> Medical history evaluation   - Prior kidney disease or dialysis: CKD  - Incidental hematuria in the past: No  - Urinary symptoms: No  - History of foamy or frothy urine: No  - History of nephrolithiasis: No  - Diseases that share risk factors with CKD: None  - Systemic diseases that might affect kidney: No  - History of use of medications that might affect renal function: No    History obtained from : patient  Review of Systems   Constitutional:  Negative for chills and fever.   HENT:  Negative for ear pain and sore throat.    Eyes:  Negative for pain and visual disturbance.   Respiratory:  Negative for cough and shortness of breath.    Cardiovascular:  Negative for chest pain and palpitations.    Gastrointestinal:  Negative for abdominal pain and vomiting.   Genitourinary:  Negative for dysuria and hematuria.   Musculoskeletal:  Negative for arthralgias and back pain.   Skin:  Negative for color change and rash.   Neurological:  Negative for seizures and syncope.   All other systems reviewed and are negative.    Past Medical History   Past Medical History:   Diagnosis Date    Allergic     Asthma     Azotemia     BPH with obstruction/lower urinary tract symptoms     Chronic kidney disease     stage 3    Elevated fasting blood sugar 03/22/2024    Headache(784.0)     Hydronephrosis     Incomplete bladder emptying     Pneumonia     Urinary retention      Past Surgical History:   Procedure Laterality Date    APPENDECTOMY      COLONOSCOPY      CYSTOSCOPY  2014    HERNIA REPAIR      KNEE SURGERY  2013    meniscus tear    MA TENDON SHEATH INCISION Right 1/19/2021    Procedure: Right long finger trigger finger release;  Surgeon: Tommy Mccarthy MD;  Location: [facility];  Service: Orthopedics    ROOT CANAL      TRANSURETHRAL RESECTION OF PROSTATE      US GUIDANCE  5/30/2018     Family History   Family history unknown: Yes   Problem Relation Age of Onset    Family history unknown: Yes    Dementia Father     Glaucoma Father     Completed Suicide  Cousin      Current Outpatient Medications on File Prior to Visit   Medication Sig Dispense Refill    acetaminophen (TYLENOL) 500 mg tablet Take 1 500mg tablet in the morning prior to surgery, then 1 tablet every 6 hours for 5-7 days. 30 tablet 0    amLODIPine (NORVASC) 2.5 mg tablet take 1 tablet by mouth once daily 90 tablet 1    Breo Ellipta 100-25 MCG/ACT inhaler inhale 1 puff by mouth and INTO THE LUNGS every morning      Cholecalciferol 5000 units capsule Take 1,000 Units by mouth daily      fluticasone (FLONASE) 50 mcg/act nasal spray 1 spray into each nostril daily 18.2 mL 1    ipratropium (ATROVENT) 0.03 % nasal spray 2 sprays into each nostril every 12 (twelve)  "hours 30 mL 0    latanoprost (XALATAN) 0.005 % ophthalmic solution place 1 drop into both eyes at bedtime      meclizine (ANTIVERT) 25 mg tablet Take 1 tablet (25 mg total) by mouth 3 (three) times a day as needed for dizziness 30 tablet 0    olopatadine (PATANOL) 0.1 % ophthalmic solution 1 drop if needed      Ventolin  (90 Base) MCG/ACT inhaler if needed       No current facility-administered medications on file prior to visit.     Allergies   Allergen Reactions    Other      Cats, Ragweed    Pollen Extract           Objective     /86 (BP Location: Left arm, Patient Position: Sitting, Cuff Size: Adult)   Pulse 87   Ht 5' 7.5\" (1.715 m)   Wt 99.8 kg (220 lb)   BMI 33.95 kg/m²     Physical Exam  Vitals and nursing note reviewed.   Constitutional:       General: He is not in acute distress.     Appearance: He is well-developed.   HENT:      Head: Normocephalic and atraumatic.   Eyes:      Conjunctiva/sclera: Conjunctivae normal.   Cardiovascular:      Rate and Rhythm: Normal rate and regular rhythm.      Pulses: Normal pulses.      Heart sounds: Normal heart sounds. No murmur heard.  Pulmonary:      Effort: Pulmonary effort is normal. No respiratory distress.      Breath sounds: Normal breath sounds.   Abdominal:      Palpations: Abdomen is soft.      Tenderness: There is no abdominal tenderness.   Musculoskeletal:         General: No swelling.      Cervical back: Neck supple.      Right lower leg: No edema.      Left lower leg: No edema.   Skin:     General: Skin is warm and dry.      Capillary Refill: Capillary refill takes less than 2 seconds.   Neurological:      Mental Status: He is alert.   Psychiatric:         Mood and Affect: Mood normal.         "

## 2024-11-14 ENCOUNTER — TELEPHONE (OUTPATIENT)
Age: 79
End: 2024-11-14

## 2024-11-14 NOTE — TELEPHONE ENCOUNTER
Pt called to let dr osullivan know he sarted farxiga today. He stated he will need labs then? Please mail to him/

## 2024-11-20 ENCOUNTER — TELEPHONE (OUTPATIENT)
Age: 79
End: 2024-11-20

## 2024-11-20 NOTE — TELEPHONE ENCOUNTER
"Phone call from patient to inform Dr. Erickson \"he has decided to stop Farxiga, too many side effects, read not good for Stage 3 Kidney Disease\". Patient will discuss further with Dr. Erickson at May 2025 office visit. Declined to speak to CTS. No Further Action Necessary.   "

## 2024-11-27 NOTE — TELEPHONE ENCOUNTER
Tried calling to discuss before and today but goes directly to voicemail.  Will keep trying to discuss.

## 2024-11-30 DIAGNOSIS — I12.9 BENIGN HYPERTENSION WITH CKD (CHRONIC KIDNEY DISEASE) STAGE III (HCC): ICD-10-CM

## 2024-11-30 DIAGNOSIS — N18.32 STAGE 3B CHRONIC KIDNEY DISEASE (HCC): ICD-10-CM

## 2024-11-30 DIAGNOSIS — N18.30 BENIGN HYPERTENSION WITH CKD (CHRONIC KIDNEY DISEASE) STAGE III (HCC): ICD-10-CM

## 2024-12-02 RX ORDER — DAPAGLIFLOZIN 5 MG/1
5 TABLET, FILM COATED ORAL DAILY
Qty: 90 TABLET | Refills: 1 | Status: SHIPPED | OUTPATIENT
Start: 2024-12-02

## 2025-03-09 DIAGNOSIS — I10 PRIMARY HYPERTENSION: ICD-10-CM

## 2025-03-10 RX ORDER — AMLODIPINE BESYLATE 2.5 MG/1
2.5 TABLET ORAL DAILY
Qty: 90 TABLET | Refills: 1 | Status: SHIPPED | OUTPATIENT
Start: 2025-03-10

## 2025-03-25 ENCOUNTER — OFFICE VISIT (OUTPATIENT)
Dept: FAMILY MEDICINE CLINIC | Facility: MEDICAL CENTER | Age: 80
End: 2025-03-25
Payer: MEDICARE

## 2025-03-25 VITALS
WEIGHT: 226.4 LBS | SYSTOLIC BLOOD PRESSURE: 130 MMHG | HEIGHT: 68 IN | OXYGEN SATURATION: 96 % | BODY MASS INDEX: 34.31 KG/M2 | TEMPERATURE: 97.6 F | RESPIRATION RATE: 20 BRPM | DIASTOLIC BLOOD PRESSURE: 80 MMHG | HEART RATE: 82 BPM

## 2025-03-25 DIAGNOSIS — Z00.00 MEDICARE ANNUAL WELLNESS VISIT, SUBSEQUENT: Primary | ICD-10-CM

## 2025-03-25 PROCEDURE — G0439 PPPS, SUBSEQ VISIT: HCPCS | Performed by: STUDENT IN AN ORGANIZED HEALTH CARE EDUCATION/TRAINING PROGRAM

## 2025-03-25 RX ORDER — AMLODIPINE BESYLATE 5 MG/1
1 TABLET ORAL DAILY
COMMUNITY
Start: 2025-03-13

## 2025-03-25 NOTE — PROGRESS NOTES
Name: Rick Burks      : 1945      MRN: [id number]  Encounter Provider: Benjamin Rivera DO  Encounter Date: 3/25/2025   Encounter department: [facility] [place]    Assessment & Plan  Medicare annual wellness visit, subsequent  Vaccinations all up-to-date including new RSV vaccine         Depression Screening and Follow-up Plan: Patient was screened for depression during today's encounter. They screened negative with a PHQ-2 score of 0.      Falls Plan of Care: balance, strength, and gait training instructions were provided.       Follow-up in 6 months and sooner as needed.    Preventive health issues were discussed with patient, and age appropriate screening tests were ordered as noted in patient's After Visit Summary. Personalized health advice and appropriate referrals for health education or preventive services given if needed, as noted in patient's After Visit Summary.    History of Present Illness     HPI   Patient Care Team:  Benjamin Rivera DO as PCP - General (Family Medicine)  Tony Erickson MD (Nephrology)    Review of Systems    As noted in HPI   Medical History Reviewed by provider this encounter:  Tobacco  Allergies  Meds  Problems  Med Hx  Surg Hx  Fam Hx       Annual Wellness Visit Questionnaire   Rick is here for his Subsequent Wellness visit.     Health Risk Assessment:   Patient rates overall health as very good. Patient feels that their physical health rating is same. Patient is very satisfied with their life. Eyesight was rated as same. Hearing was rated as same. Patient feels that their emotional and mental health rating is same. Patients states they are never, rarely angry. Patient states they are never, rarely unusually tired/fatigued. Pain experienced in the last 7 days has been some. Patient's pain rating has been 1/10. Patient states that he has experienced no weight loss or gain in last 6 months.     Depression Screening:   PHQ-2 Score: 0      Fall  Risk Screening:   In the past year, patient has experienced: no history of falling in past year      Home Safety:  Patient does not have trouble with stairs inside or outside of their home. Patient has working smoke alarms and has working carbon monoxide detector. Home safety hazards include: none.     Nutrition:   Current diet is Regular, No Added Salt and Limited junk food.     Medications:   Patient is currently taking over-the-counter supplements. OTC medications include: see medication list. Patient is able to manage medications.     Activities of Daily Living (ADLs)/Instrumental Activities of Daily Living (IADLs):   Walk and transfer into and out of bed and chair?: Yes  Dress and groom yourself?: Yes    Bathe or shower yourself?: Yes    Feed yourself? Yes  Do your laundry/housekeeping?: Yes  Manage your money, pay your bills and track your expenses?: Yes  Make your own meals?: Yes    Do your own shopping?: Yes    Previous Hospitalizations:   Any hospitalizations or ED visits within the last 12 months?: No      Advance Care Planning:   Living will: Yes    Durable POA for healthcare: Yes    Advanced directive: Yes      Cognitive Screening:   Provider or family/friend/caregiver concerned regarding cognition?: No    PREVENTIVE SCREENINGS      Cardiovascular Screening:    General: History Lipid Disorder and Screening Current      Diabetes Screening:     General: Screening Current      Colorectal Cancer Screening:     General: Risks and Benefits Discussed and Patient Declines      Prostate Cancer Screening:    General: Screening Not Indicated      Osteoporosis Screening:    General: Screening Not Indicated      Abdominal Aortic Aneurysm (AAA) Screening:    Risk factors include: tobacco use        General: Screening Not Indicated      Lung Cancer Screening:     General: Screening Not Indicated      Hepatitis C Screening:    General: Screening Current    Screening, Brief Intervention, and Referral to Treatment (SBIRT)  "    Screening  Typical number of drinks in a day: 1  Typical number of drinks in a week: 2  Interpretation: Low risk drinking behavior.    AUDIT-C Screenin) How often did you have a drink containing alcohol in the past year? 2 to 3 times a week  2) How many drinks did you have on a typical day when you were drinking in the past year? 1 to 2  3) How often did you have 6 or more drinks on one occasion in the past year? never    AUDIT-C Score: 3  Interpretation: Score 0-3 (male): Negative screen for alcohol misuse    Single Item Drug Screening:  How often have you used an illegal drug (including marijuana) or a prescription medication for non-medical reasons in the past year? never    Single Item Drug Screen Score: 0  Interpretation: Negative screen for possible drug use disorder    Other Counseling Topics:   Car/seat belt/driving safety and sunscreen. Saw Dermatology for skin check    Social Drivers of Health     Financial Resource Strain: Low Risk  (3/3/2023)    Overall Financial Resource Strain (CARDIA)     Difficulty of Paying Living Expenses: Not hard at all   Food Insecurity: No Food Insecurity (3/25/2025)    Hunger Vital Sign     Worried About Running Out of Food in the Last Year: Never true     Ran Out of Food in the Last Year: Never true   Transportation Needs: No Transportation Needs (3/25/2025)    PRAPARE - Transportation     Lack of Transportation (Medical): No     Lack of Transportation (Non-Medical): No   Housing Stability: Low Risk  (3/25/2025)    Housing Stability Vital Sign     Unable to Pay for Housing in the Last Year: No     Number of Times Moved in the Last Year: 0     Homeless in the Last Year: No   Utilities: Not At Risk (3/25/2025)    [facility] Utilities     Threatened with loss of utilities: No     No results found.    Objective   /80 (BP Location: Left arm, Patient Position: Sitting, Cuff Size: Large)   Pulse 82   Temp 97.6 °F (36.4 °C) (Temporal)   Resp 20   Ht 5' 7.5\" (1.715 m)   " Wt 103 kg (226 lb 6.4 oz)   SpO2 96%   BMI 34.94 kg/m²     Physical Exam  Vitals reviewed.   Constitutional:       General: He is not in acute distress.     Appearance: Normal appearance.   HENT:      Head: Normocephalic and atraumatic.   Eyes:      Conjunctiva/sclera: Conjunctivae normal.   Pulmonary:      Effort: Pulmonary effort is normal.   Neurological:      Mental Status: He is alert and oriented to person, place, and time.   Psychiatric:         Mood and Affect: Mood normal.         Behavior: Behavior normal.         Thought Content: Thought content normal.         Judgment: Judgment normal.

## 2025-03-25 NOTE — PATIENT INSTRUCTIONS
Patient Education     Preventing falls in adults   The Basics   Written by the doctors and editors at [facility]   Am I at risk of falling? -- Falls can happen to anyone, no matter how old they are. Several things can increase your risk of a fall, including:   Vision problems   Having certain chronic health conditions, being sick, having recently been in the hospital, or having had surgery   Changing the medicines you take   An unsafe or unfamiliar setting (for example, a room with rugs or furniture that might trip you, or an area you don't know well)  Your risk of falling increases as you grow older. That's because getting older can make it harder to walk steadily and keep your balance. Also, the effects of falls are more serious for older people.  Overall, 3 to 4 out of every 10 people over the age of 65 fall each year. Many people who fracture a hip never fully recover to how they were before the fracture. If you have fallen in the past, you are at higher risk of falling again.  How can my doctor help me avoid falling? -- Your doctor can talk to you about the following things:   Past falls - It is important to tell your doctor about any times that you have fallen or almost fallen. They can then suggest ways to prevent another fall.   Your health conditions - Some health problems can put you at risk of falling. These include conditions that affect eyesight, hearing, muscle strength, or balance.   What to do if you are in the hospital - Falls can happen in the hospital because you are in an unfamiliar place. You might feel unsteady or drowsy from medicines or anesthesia. Or you might be attached to catheters or IV tubing that you could trip over. You are also likely to be weaker than usual.   Your medicines - Certain medicines can increase the risk of falling. These include some medicines for sleeping problems, anxiety, high blood pressure, or depression. Adding new medicines, or changing doses of some medicines, can  also affect your risk of falling.  The more your doctor knows about your situation, the better they can help you. For example, if you fell because you have a condition that causes pain, your doctor might suggest treatments to help with the pain. Or if any of your medicines are making you dizzy and more likely to fall, your doctor might switch you to a different medicine.  Is there anything I can do on my own? -- Yes. To help keep from falling, you can:   Make your home safer - To avoid falling at home, get rid of things that might make you trip or slip. This can include furniture, electrical cords, clutter, and loose rugs (figure 1). Keep your home well lit so you can easily see where you are going. Avoid storing things in high places so you don't have to reach or climb.   Wear non-slip socks or sturdy shoes that fit well - Wearing shoes with high heels or slippery soles, or shoes that are too loose, can lead to falls. Walking around in bare feet, or only socks, can also increase your risk of falling.   Take vitamin D pills - Taking vitamin D might lower the risk of falls in older people. This is because vitamin D helps make bones and muscles stronger. Your doctor can talk to you about whether you should take extra vitamin D, and how much.   Stay active - Moving your body regularly can help lower your risk of falling. It might also help prevent you from getting hurt if you do fall. It is best to do a few different activities that help with both strength and balance. There are many kinds of exercise that can be safe for older people. These include walking, swimming, and paul chi (a Chinese martial art involving slow, gentle movements).   Use a cane, walker, or other safety devices - If your doctor recommends that you use a cane or walker, make sure that it's the right size and you know how to use it. There are other devices that might help you avoid falling, too. These include grab bars or a sturdy seat for the  shower, non-slip bath mats, and hand rails or treads for the stairs (to prevent slipping).   Take extra care if you have had surgery or are in the hospital - Ask for help with getting out of bed, getting up from a chair, or going to the bathroom. Your body needs time to heal, and it's normal to need help with these things while you recover. It can also help to keep your belongings within reach, and avoid walking around in the dark. If you need help, use the call button instead of trying to get up.  If you worry that you could fall, you can get an emergency alert system. This is usually an alarm button that lets you call for help if you fall and can't get up. If you live alone and you do not have an emergency alert system, always carry a cell phone or portable phone with you when moving around the house.  How do I get up from a fall? -- If you do fall, try not to be afraid. Stay calm, and take slow, deep breaths. If someone is near you, call for help right away. If you have an emergency alert system, use it.  Try to find out if you are hurt. If you are hurt badly, trying to get up can make things worse. If you do not think that you are hurt badly, come up with a plan to get up off of the floor.  Some tips to get up after a fall if you are alone:   Look around you for a piece of sturdy furniture such as a couch or chair. Try to move your body to the furniture. You might need to scoot, crawl, or roll to get close. Do these movements very slowly. If you feel any sharp pains, you might need to stop.   Once you are close to the furniture, roll onto your side. Pull your knees up toward your chest, and try to get on your hands and knees.   Put your hands on the seat of the couch or chair.   Bring 1 leg forward so the foot is flat on the floor. If you have a stronger leg, move this leg first.   Now, try to stand up. Once both feet are on the ground, slowly turn and lower yourself to sit down on the seat.  What should I do  after a fall? -- If you had a fall, see your doctor right away, even if you aren't hurt. Your doctor can try to figure out what caused you to fall, and how likely you are to fall again. They will do an exam and talk to you about your health problems, medicines, and activities. They can also check how well you walk, move, and balance. Then, they can suggest things you can do to lower your risk of falling again.  Many older people have a hard time recovering after a fall. Doing things to prevent falling can help you protect your health and independence.  All topics are updated as new evidence becomes available and our peer review process is complete.  This topic retrieved from [employer] on: Apr 04, 2024.  Topic 69528 Version 25.0  Release: 32.2.4 - C32.93  © 2024 [employer] and/or its affiliates. All rights reserved.  figure 1: How to avoid falling at home     This picture shows some of the things that can cause a fall in your home. Look around and remove any loose rugs, electrical cords, clutter, or furniture that could trip you.  Graphic 94310 Version 1.0  Consumer Information Use and Disclaimer   Disclaimer: This generalized information is a limited summary of diagnosis, treatment, and/or medication information. It is not meant to be comprehensive and should be used as a tool to help the user understand and/or assess potential diagnostic and treatment options. It does NOT include all information about conditions, treatments, medications, side effects, or risks that may apply to a specific patient. It is not intended to be medical advice or a substitute for the medical advice, diagnosis, or treatment of a health care provider based on the health care provider's examination and assessment of a patient's specific and unique circumstances. Patients must speak with a health care provider for complete information about their health, medical questions, and treatment options, including any risks or benefits regarding  use of medications. This information does not endorse any treatments or medications as safe, effective, or approved for treating a specific patient. [employer] and its affiliates disclaim any warranty or liability relating to this information or the use thereof.The use of this information is governed by the Terms of Use, available at https://www.[employer].com/en/know/clinical-effectiveness-terms. 2024© [employer], [employer] and its affiliates and/or licensors. All rights reserved.  Copyright   © 2024 [employer] and/or its affiliates. All rights reserved.

## (undated) DEVICE — STERILE BETHLEHEM PLASTIC HAND: Brand: CARDINAL HEALTH

## (undated) DEVICE — GLOVE INDICATOR PI UNDERGLOVE SZ 8 BLUE

## (undated) DEVICE — NEEDLE 25G X 1 1/2

## (undated) DEVICE — SUT ETHILON 4-0 PS-2 18 IN 1667H

## (undated) DEVICE — INTENDED FOR TISSUE SEPARATION, AND OTHER PROCEDURES THAT REQUIRE A SHARP SURGICAL BLADE TO PUNCTURE OR CUT.: Brand: BARD-PARKER ® CARBON RIB-BACK BLADES

## (undated) DEVICE — GAUZE SPONGES,16 PLY: Brand: CURITY

## (undated) DEVICE — CURITY NON-ADHERENT STRIPS: Brand: CURITY

## (undated) DEVICE — LIGHT HANDLE COVER SLEEVE DISP BLUE STELLAR

## (undated) DEVICE — GLOVE SRG BIOGEL ORTHOPEDIC 8

## (undated) DEVICE — GLOVE SRG BIOGEL 8

## (undated) DEVICE — COBAN 2 IN UNSTERILE

## (undated) DEVICE — STRETCH BANDAGE: Brand: CURITY

## (undated) DEVICE — PAD GROUNDING ADULT

## (undated) DEVICE — TUBING SUCTION 5MM X 12 FT